# Patient Record
Sex: FEMALE | Race: OTHER | HISPANIC OR LATINO | Employment: UNEMPLOYED | URBAN - METROPOLITAN AREA
[De-identification: names, ages, dates, MRNs, and addresses within clinical notes are randomized per-mention and may not be internally consistent; named-entity substitution may affect disease eponyms.]

---

## 2017-01-03 ENCOUNTER — APPOINTMENT (OUTPATIENT)
Dept: RADIATION ONCOLOGY | Facility: CLINIC | Age: 52
End: 2017-01-03
Attending: RADIOLOGY
Payer: COMMERCIAL

## 2017-01-03 PROCEDURE — 77387 GUIDANCE FOR RADJ TX DLVR: CPT | Performed by: RADIOLOGY

## 2017-01-03 PROCEDURE — 77412 RADIATION TX DELIVERY LVL 3: CPT | Performed by: RADIOLOGY

## 2017-01-04 ENCOUNTER — APPOINTMENT (OUTPATIENT)
Dept: LAB | Facility: CLINIC | Age: 52
End: 2017-01-04
Attending: RADIOLOGY
Payer: COMMERCIAL

## 2017-01-04 DIAGNOSIS — C50.412 MALIGNANT NEOPLASM OF UPPER-OUTER QUADRANT OF LEFT FEMALE BREAST (HCC): ICD-10-CM

## 2017-01-04 LAB
ERYTHROCYTE [DISTWIDTH] IN BLOOD BY AUTOMATED COUNT: 12.5 % (ref 11.6–15.1)
HCT VFR BLD AUTO: 31.8 % (ref 34.8–46.1)
HGB BLD-MCNC: 10.4 G/DL (ref 11.5–15.4)
MCH RBC QN AUTO: 26.9 PG (ref 26.8–34.3)
MCHC RBC AUTO-ENTMCNC: 32.7 G/DL (ref 31.4–37.4)
MCV RBC AUTO: 82 FL (ref 82–98)
PLATELET # BLD AUTO: 201 THOUSANDS/UL (ref 149–390)
PMV BLD AUTO: 8.2 FL (ref 8.9–12.7)
RBC # BLD AUTO: 3.87 MILLION/UL (ref 3.81–5.12)
WBC # BLD AUTO: 4.4 THOUSAND/UL (ref 4.31–10.16)

## 2017-01-04 PROCEDURE — 77417 THER RADIOLOGY PORT IMAGE(S): CPT | Performed by: RADIOLOGY

## 2017-01-04 PROCEDURE — 77412 RADIATION TX DELIVERY LVL 3: CPT | Performed by: RADIOLOGY

## 2017-01-04 PROCEDURE — 77387 GUIDANCE FOR RADJ TX DLVR: CPT | Performed by: RADIOLOGY

## 2017-01-04 PROCEDURE — 85027 COMPLETE CBC AUTOMATED: CPT

## 2017-01-04 PROCEDURE — 36415 COLL VENOUS BLD VENIPUNCTURE: CPT

## 2017-01-04 RX ORDER — SODIUM CHLORIDE 9 MG/ML
20 INJECTION, SOLUTION INTRAVENOUS CONTINUOUS
Status: DISCONTINUED | OUTPATIENT
Start: 2017-01-05 | End: 2017-01-08 | Stop reason: HOSPADM

## 2017-01-05 ENCOUNTER — HOSPITAL ENCOUNTER (OUTPATIENT)
Dept: INFUSION CENTER | Facility: CLINIC | Age: 52
Discharge: HOME | End: 2017-01-05
Payer: COMMERCIAL

## 2017-01-05 ENCOUNTER — ALLSCRIPTS OFFICE VISIT (OUTPATIENT)
Dept: OTHER | Facility: OTHER | Age: 52
End: 2017-01-05

## 2017-01-05 VITALS — WEIGHT: 108.14 LBS | BODY MASS INDEX: 21.84 KG/M2

## 2017-01-05 PROCEDURE — 77387 GUIDANCE FOR RADJ TX DLVR: CPT | Performed by: RADIOLOGY

## 2017-01-05 PROCEDURE — 96413 CHEMO IV INFUSION 1 HR: CPT

## 2017-01-05 PROCEDURE — 77412 RADIATION TX DELIVERY LVL 3: CPT | Performed by: RADIOLOGY

## 2017-01-05 RX ADMIN — Medication 300 UNITS: at 12:46

## 2017-01-05 RX ADMIN — TRASTUZUMAB 286 MG: KIT at 11:41

## 2017-01-05 RX ADMIN — SODIUM CHLORIDE 20 ML/HR: 0.9 INJECTION, SOLUTION INTRAVENOUS at 11:30

## 2017-01-05 NOTE — PROGRESS NOTES
Pt tolerated treatment without complaints  Pt discharged from Infusion center to home with daughter  Pt will follow up with PCP  Pt/daughter aware of next treatment

## 2017-01-05 NOTE — PROGRESS NOTES
Pt arrived to Infusion center for Herceptinpt, was seen in  Dr Flex Mccann office this am   VS upon arrival temp 100 9f, discussed with pt if any symptoms and pt states" I am coming down with a cold"  Pt c/o coughing with congestion and occasional phlegm clear-yellow in color  Called and spoke with Paola in Dr Flex Mccann office, ok to give treatment and Recommended pt call PCP for follow up  Pt and daughter verbalized understanding

## 2017-01-09 PROCEDURE — 77336 RADIATION PHYSICS CONSULT: CPT | Performed by: RADIOLOGY

## 2017-01-09 PROCEDURE — 77412 RADIATION TX DELIVERY LVL 3: CPT | Performed by: RADIOLOGY

## 2017-01-09 PROCEDURE — 77387 GUIDANCE FOR RADJ TX DLVR: CPT | Performed by: RADIOLOGY

## 2017-01-09 PROCEDURE — 77417 THER RADIOLOGY PORT IMAGE(S): CPT | Performed by: RADIOLOGY

## 2017-01-10 PROCEDURE — 77387 GUIDANCE FOR RADJ TX DLVR: CPT | Performed by: RADIOLOGY

## 2017-01-10 PROCEDURE — 77412 RADIATION TX DELIVERY LVL 3: CPT | Performed by: RADIOLOGY

## 2017-01-11 PROCEDURE — 77412 RADIATION TX DELIVERY LVL 3: CPT | Performed by: RADIOLOGY

## 2017-01-11 PROCEDURE — 77387 GUIDANCE FOR RADJ TX DLVR: CPT | Performed by: RADIOLOGY

## 2017-01-11 PROCEDURE — 77417 THER RADIOLOGY PORT IMAGE(S): CPT | Performed by: RADIOLOGY

## 2017-01-12 PROCEDURE — 77387 GUIDANCE FOR RADJ TX DLVR: CPT | Performed by: RADIOLOGY

## 2017-01-12 PROCEDURE — 77412 RADIATION TX DELIVERY LVL 3: CPT | Performed by: RADIOLOGY

## 2017-01-13 PROCEDURE — 77387 GUIDANCE FOR RADJ TX DLVR: CPT | Performed by: RADIOLOGY

## 2017-01-13 PROCEDURE — 77412 RADIATION TX DELIVERY LVL 3: CPT | Performed by: RADIOLOGY

## 2017-01-15 ENCOUNTER — APPOINTMENT (EMERGENCY)
Dept: CT IMAGING | Facility: HOSPITAL | Age: 52
End: 2017-01-15
Payer: COMMERCIAL

## 2017-01-15 ENCOUNTER — HOSPITAL ENCOUNTER (EMERGENCY)
Facility: HOSPITAL | Age: 52
Discharge: HOME | End: 2017-01-15
Attending: EMERGENCY MEDICINE | Admitting: EMERGENCY MEDICINE
Payer: COMMERCIAL

## 2017-01-15 ENCOUNTER — APPOINTMENT (EMERGENCY)
Dept: RADIOLOGY | Facility: HOSPITAL | Age: 52
End: 2017-01-15
Payer: COMMERCIAL

## 2017-01-15 VITALS
SYSTOLIC BLOOD PRESSURE: 127 MMHG | TEMPERATURE: 100.5 F | WEIGHT: 101.8 LBS | HEART RATE: 107 BPM | DIASTOLIC BLOOD PRESSURE: 61 MMHG | OXYGEN SATURATION: 97 % | RESPIRATION RATE: 20 BRPM | BODY MASS INDEX: 20.56 KG/M2

## 2017-01-15 DIAGNOSIS — J18.9 LEFT LOWER LOBE PNEUMONIA: Primary | ICD-10-CM

## 2017-01-15 LAB
ANION GAP SERPL CALCULATED.3IONS-SCNC: 8 MMOL/L (ref 4–13)
BASOPHILS # BLD AUTO: 0.01 THOUSANDS/ΜL (ref 0–0.1)
BASOPHILS NFR BLD AUTO: 0 % (ref 0–1)
BUN SERPL-MCNC: 27 MG/DL (ref 5–25)
CALCIUM SERPL-MCNC: 9.8 MG/DL (ref 8.3–10.1)
CHLORIDE SERPL-SCNC: 97 MMOL/L (ref 100–108)
CO2 SERPL-SCNC: 30 MMOL/L (ref 21–32)
CREAT SERPL-MCNC: 1.01 MG/DL (ref 0.6–1.3)
DEPRECATED D DIMER PPP: 1247 NG/ML (FEU) (ref 0–424)
EOSINOPHIL # BLD AUTO: 0.05 THOUSAND/ΜL (ref 0–0.61)
EOSINOPHIL NFR BLD AUTO: 1 % (ref 0–6)
ERYTHROCYTE [DISTWIDTH] IN BLOOD BY AUTOMATED COUNT: 12.6 % (ref 11.6–15.1)
GFR SERPL CREATININE-BSD FRML MDRD: 58 ML/MIN/1.73SQ M
GLUCOSE SERPL-MCNC: 183 MG/DL (ref 65–140)
HCT VFR BLD AUTO: 32.2 % (ref 34.8–46.1)
HGB BLD-MCNC: 10.5 G/DL (ref 11.5–15.4)
LYMPHOCYTES # BLD AUTO: 0.47 THOUSANDS/ΜL (ref 0.6–4.47)
LYMPHOCYTES NFR BLD AUTO: 5 % (ref 14–44)
MAGNESIUM SERPL-MCNC: 2.1 MG/DL (ref 1.6–2.6)
MCH RBC QN AUTO: 26.9 PG (ref 26.8–34.3)
MCHC RBC AUTO-ENTMCNC: 32.6 G/DL (ref 31.4–37.4)
MCV RBC AUTO: 82 FL (ref 82–98)
MONOCYTES # BLD AUTO: 0.62 THOUSAND/ΜL (ref 0.17–1.22)
MONOCYTES NFR BLD AUTO: 6 % (ref 4–12)
NEUTROPHILS # BLD AUTO: 8.48 THOUSANDS/ΜL (ref 1.85–7.62)
NEUTS SEG NFR BLD AUTO: 88 % (ref 43–75)
NRBC BLD AUTO-RTO: 0 /100 WBCS
PLATELET # BLD AUTO: 373 THOUSANDS/UL (ref 149–390)
PMV BLD AUTO: 9.9 FL (ref 8.9–12.7)
POTASSIUM SERPL-SCNC: 3.2 MMOL/L (ref 3.5–5.3)
RBC # BLD AUTO: 3.91 MILLION/UL (ref 3.81–5.12)
SODIUM SERPL-SCNC: 135 MMOL/L (ref 136–145)
WBC # BLD AUTO: 9.63 THOUSAND/UL (ref 4.31–10.16)

## 2017-01-15 PROCEDURE — 85025 COMPLETE CBC W/AUTO DIFF WBC: CPT | Performed by: EMERGENCY MEDICINE

## 2017-01-15 PROCEDURE — 83735 ASSAY OF MAGNESIUM: CPT | Performed by: EMERGENCY MEDICINE

## 2017-01-15 PROCEDURE — 96365 THER/PROPH/DIAG IV INF INIT: CPT

## 2017-01-15 PROCEDURE — 71275 CT ANGIOGRAPHY CHEST: CPT

## 2017-01-15 PROCEDURE — 80048 BASIC METABOLIC PNL TOTAL CA: CPT | Performed by: EMERGENCY MEDICINE

## 2017-01-15 PROCEDURE — 85379 FIBRIN DEGRADATION QUANT: CPT | Performed by: EMERGENCY MEDICINE

## 2017-01-15 PROCEDURE — 96361 HYDRATE IV INFUSION ADD-ON: CPT

## 2017-01-15 PROCEDURE — 36415 COLL VENOUS BLD VENIPUNCTURE: CPT | Performed by: EMERGENCY MEDICINE

## 2017-01-15 PROCEDURE — 99284 EMERGENCY DEPT VISIT MOD MDM: CPT

## 2017-01-15 PROCEDURE — 71020 HB CHEST X-RAY 2VW FRONTAL&LATL: CPT

## 2017-01-15 PROCEDURE — 94640 AIRWAY INHALATION TREATMENT: CPT

## 2017-01-15 RX ORDER — SODIUM CHLORIDE FOR INHALATION 0.9 %
3 VIAL, NEBULIZER (ML) INHALATION ONCE
Status: COMPLETED | OUTPATIENT
Start: 2017-01-15 | End: 2017-01-15

## 2017-01-15 RX ORDER — AMOXICILLIN AND CLAVULANATE POTASSIUM 875; 125 MG/1; MG/1
1 TABLET, FILM COATED ORAL EVERY 12 HOURS
Qty: 20 TABLET | Refills: 0 | Status: SHIPPED | OUTPATIENT
Start: 2017-01-15 | End: 2017-01-21 | Stop reason: HOSPADM

## 2017-01-15 RX ORDER — AMLODIPINE BESYLATE 5 MG/1
5 TABLET ORAL DAILY
COMMUNITY

## 2017-01-15 RX ADMIN — ISODIUM CHLORIDE 3 ML: 0.03 SOLUTION RESPIRATORY (INHALATION) at 12:07

## 2017-01-15 RX ADMIN — CEFTRIAXONE 1000 MG: 1 INJECTION, POWDER, FOR SOLUTION INTRAMUSCULAR; INTRAVENOUS at 14:57

## 2017-01-15 RX ADMIN — SODIUM CHLORIDE 1000 ML: 0.9 INJECTION, SOLUTION INTRAVENOUS at 12:14

## 2017-01-15 RX ADMIN — IOHEXOL 70 ML: 350 INJECTION, SOLUTION INTRAVENOUS at 14:27

## 2017-01-15 RX ADMIN — IPRATROPIUM BROMIDE 1 MG: 0.5 SOLUTION RESPIRATORY (INHALATION) at 12:07

## 2017-01-15 RX ADMIN — ALBUTEROL SULFATE 10 MG: 2.5 SOLUTION RESPIRATORY (INHALATION) at 12:07

## 2017-01-16 PROCEDURE — 77412 RADIATION TX DELIVERY LVL 3: CPT | Performed by: RADIOLOGY

## 2017-01-16 PROCEDURE — 77336 RADIATION PHYSICS CONSULT: CPT | Performed by: RADIOLOGY

## 2017-01-16 PROCEDURE — 77387 GUIDANCE FOR RADJ TX DLVR: CPT | Performed by: RADIOLOGY

## 2017-01-17 ENCOUNTER — HOSPITAL ENCOUNTER (INPATIENT)
Facility: HOSPITAL | Age: 52
LOS: 4 days | Discharge: HOME | DRG: 139 | End: 2017-01-21
Attending: EMERGENCY MEDICINE | Admitting: INTERNAL MEDICINE
Payer: COMMERCIAL

## 2017-01-17 ENCOUNTER — APPOINTMENT (EMERGENCY)
Dept: RADIOLOGY | Facility: HOSPITAL | Age: 52
DRG: 139 | End: 2017-01-17
Payer: COMMERCIAL

## 2017-01-17 DIAGNOSIS — J45.909 ASTHMA: ICD-10-CM

## 2017-01-17 DIAGNOSIS — C50.919: ICD-10-CM

## 2017-01-17 DIAGNOSIS — J18.9 PNEUMONIA: Primary | ICD-10-CM

## 2017-01-17 LAB
ANION GAP SERPL CALCULATED.3IONS-SCNC: 10 MMOL/L (ref 4–13)
BASOPHILS # BLD AUTO: 0.01 THOUSANDS/ΜL (ref 0–0.1)
BASOPHILS NFR BLD AUTO: 0 % (ref 0–1)
BUN SERPL-MCNC: 17 MG/DL (ref 5–25)
CALCIUM SERPL-MCNC: 9.9 MG/DL (ref 8.3–10.1)
CHLORIDE SERPL-SCNC: 98 MMOL/L (ref 100–108)
CO2 SERPL-SCNC: 29 MMOL/L (ref 21–32)
CREAT SERPL-MCNC: 0.86 MG/DL (ref 0.6–1.3)
EOSINOPHIL # BLD AUTO: 0.1 THOUSAND/ΜL (ref 0–0.61)
EOSINOPHIL NFR BLD AUTO: 2 % (ref 0–6)
ERYTHROCYTE [DISTWIDTH] IN BLOOD BY AUTOMATED COUNT: 12.6 % (ref 11.6–15.1)
GFR SERPL CREATININE-BSD FRML MDRD: >60 ML/MIN/1.73SQ M
GLUCOSE SERPL-MCNC: 141 MG/DL (ref 65–140)
HCT VFR BLD AUTO: 33.6 % (ref 34.8–46.1)
HGB BLD-MCNC: 11 G/DL (ref 11.5–15.4)
L PNEUMO1 AG UR QL IA.RAPID: NEGATIVE
LYMPHOCYTES # BLD AUTO: 0.61 THOUSANDS/ΜL (ref 0.6–4.47)
LYMPHOCYTES NFR BLD AUTO: 12 % (ref 14–44)
MCH RBC QN AUTO: 26.5 PG (ref 26.8–34.3)
MCHC RBC AUTO-ENTMCNC: 32.7 G/DL (ref 31.4–37.4)
MCV RBC AUTO: 81 FL (ref 82–98)
MONOCYTES # BLD AUTO: 0.34 THOUSAND/ΜL (ref 0.17–1.22)
MONOCYTES NFR BLD AUTO: 7 % (ref 4–12)
NEUTROPHILS # BLD AUTO: 4.14 THOUSANDS/ΜL (ref 1.85–7.62)
NEUTS SEG NFR BLD AUTO: 79 % (ref 43–75)
PLATELET # BLD AUTO: 505 THOUSANDS/UL (ref 149–390)
PMV BLD AUTO: 10.3 FL (ref 8.9–12.7)
POTASSIUM SERPL-SCNC: 3.2 MMOL/L (ref 3.5–5.3)
RBC # BLD AUTO: 4.15 MILLION/UL (ref 3.81–5.12)
S PNEUM AG UR QL: NEGATIVE
SODIUM SERPL-SCNC: 137 MMOL/L (ref 136–145)
WBC # BLD AUTO: 5.2 THOUSAND/UL (ref 4.31–10.16)

## 2017-01-17 PROCEDURE — 99285 EMERGENCY DEPT VISIT HI MDM: CPT

## 2017-01-17 PROCEDURE — 71020 HB CHEST X-RAY 2VW FRONTAL&LATL: CPT

## 2017-01-17 PROCEDURE — 85025 COMPLETE CBC W/AUTO DIFF WBC: CPT | Performed by: EMERGENCY MEDICINE

## 2017-01-17 PROCEDURE — 87449 NOS EACH ORGANISM AG IA: CPT | Performed by: PHYSICIAN ASSISTANT

## 2017-01-17 PROCEDURE — 36415 COLL VENOUS BLD VENIPUNCTURE: CPT | Performed by: EMERGENCY MEDICINE

## 2017-01-17 PROCEDURE — 94760 N-INVAS EAR/PLS OXIMETRY 1: CPT

## 2017-01-17 PROCEDURE — 94640 AIRWAY INHALATION TREATMENT: CPT

## 2017-01-17 PROCEDURE — 80048 BASIC METABOLIC PNL TOTAL CA: CPT | Performed by: EMERGENCY MEDICINE

## 2017-01-17 RX ORDER — ZOLPIDEM TARTRATE 5 MG/1
5 TABLET ORAL
Status: DISCONTINUED | OUTPATIENT
Start: 2017-01-17 | End: 2017-01-18

## 2017-01-17 RX ORDER — GUAIFENESIN 600 MG
600 TABLET, EXTENDED RELEASE 12 HR ORAL 2 TIMES DAILY
Status: DISCONTINUED | OUTPATIENT
Start: 2017-01-17 | End: 2017-01-21 | Stop reason: HOSPADM

## 2017-01-17 RX ORDER — LORAZEPAM 2 MG/ML
0.5 INJECTION INTRAMUSCULAR EVERY 4 HOURS PRN
Status: DISCONTINUED | OUTPATIENT
Start: 2017-01-17 | End: 2017-01-21 | Stop reason: HOSPADM

## 2017-01-17 RX ORDER — AMLODIPINE BESYLATE 5 MG/1
5 TABLET ORAL DAILY
Status: DISCONTINUED | OUTPATIENT
Start: 2017-01-17 | End: 2017-01-21 | Stop reason: HOSPADM

## 2017-01-17 RX ORDER — ONDANSETRON 2 MG/ML
4 INJECTION INTRAMUSCULAR; INTRAVENOUS EVERY 6 HOURS PRN
Status: DISCONTINUED | OUTPATIENT
Start: 2017-01-17 | End: 2017-01-21 | Stop reason: HOSPADM

## 2017-01-17 RX ORDER — LEVOFLOXACIN 5 MG/ML
750 INJECTION, SOLUTION INTRAVENOUS EVERY 24 HOURS
Status: DISCONTINUED | OUTPATIENT
Start: 2017-01-17 | End: 2017-01-20

## 2017-01-17 RX ORDER — METOPROLOL TARTRATE 50 MG/1
50 TABLET, FILM COATED ORAL DAILY
Status: DISCONTINUED | OUTPATIENT
Start: 2017-01-17 | End: 2017-01-21 | Stop reason: HOSPADM

## 2017-01-17 RX ORDER — SODIUM CHLORIDE FOR INHALATION 0.9 %
VIAL, NEBULIZER (ML) INHALATION
Status: COMPLETED
Start: 2017-01-17 | End: 2017-01-17

## 2017-01-17 RX ORDER — POTASSIUM CHLORIDE 20 MEQ/1
40 TABLET, EXTENDED RELEASE ORAL ONCE
Status: COMPLETED | OUTPATIENT
Start: 2017-01-17 | End: 2017-01-17

## 2017-01-17 RX ORDER — MAGNESIUM HYDROXIDE/ALUMINUM HYDROXICE/SIMETHICONE 120; 1200; 1200 MG/30ML; MG/30ML; MG/30ML
30 SUSPENSION ORAL EVERY 6 HOURS PRN
Status: DISCONTINUED | OUTPATIENT
Start: 2017-01-17 | End: 2017-01-21 | Stop reason: HOSPADM

## 2017-01-17 RX ORDER — LEVOFLOXACIN 5 MG/ML
750 INJECTION, SOLUTION INTRAVENOUS ONCE
Status: COMPLETED | OUTPATIENT
Start: 2017-01-17 | End: 2017-01-17

## 2017-01-17 RX ORDER — LEVALBUTEROL 1.25 MG/.5ML
1.25 SOLUTION, CONCENTRATE RESPIRATORY (INHALATION) EVERY 6 HOURS PRN
Status: DISCONTINUED | OUTPATIENT
Start: 2017-01-17 | End: 2017-01-18

## 2017-01-17 RX ORDER — ALBUTEROL SULFATE 90 UG/1
2 AEROSOL, METERED RESPIRATORY (INHALATION) EVERY 4 HOURS PRN
Status: DISCONTINUED | OUTPATIENT
Start: 2017-01-17 | End: 2017-01-21 | Stop reason: HOSPADM

## 2017-01-17 RX ORDER — ACETAMINOPHEN 325 MG/1
650 TABLET ORAL EVERY 6 HOURS PRN
Status: DISCONTINUED | OUTPATIENT
Start: 2017-01-17 | End: 2017-01-21 | Stop reason: HOSPADM

## 2017-01-17 RX ORDER — LISINOPRIL 20 MG/1
20 TABLET ORAL DAILY
Status: DISCONTINUED | OUTPATIENT
Start: 2017-01-17 | End: 2017-01-21 | Stop reason: HOSPADM

## 2017-01-17 RX ORDER — HYDRALAZINE HYDROCHLORIDE 20 MG/ML
10 INJECTION INTRAMUSCULAR; INTRAVENOUS EVERY 6 HOURS PRN
Status: DISCONTINUED | OUTPATIENT
Start: 2017-01-17 | End: 2017-01-21 | Stop reason: HOSPADM

## 2017-01-17 RX ADMIN — POTASSIUM CHLORIDE 40 MEQ: 1500 TABLET, EXTENDED RELEASE ORAL at 13:35

## 2017-01-17 RX ADMIN — ISODIUM CHLORIDE 3 ML: 0.03 SOLUTION RESPIRATORY (INHALATION) at 23:06

## 2017-01-17 RX ADMIN — IPRATROPIUM BROMIDE 0.5 MG: 0.5 SOLUTION RESPIRATORY (INHALATION) at 09:34

## 2017-01-17 RX ADMIN — LEVALBUTEROL 1.25 MG: 1.25 SOLUTION, CONCENTRATE RESPIRATORY (INHALATION) at 16:46

## 2017-01-17 RX ADMIN — LEVALBUTEROL 1.25 MG: 1.25 SOLUTION, CONCENTRATE RESPIRATORY (INHALATION) at 23:06

## 2017-01-17 RX ADMIN — LEVOFLOXACIN 750 MG: 5 INJECTION, SOLUTION INTRAVENOUS at 11:27

## 2017-01-17 RX ADMIN — ISODIUM CHLORIDE 3 ML: 0.03 SOLUTION RESPIRATORY (INHALATION) at 16:46

## 2017-01-17 RX ADMIN — ZOLPIDEM TARTRATE 5 MG: 5 TABLET, FILM COATED ORAL at 23:08

## 2017-01-17 RX ADMIN — LORAZEPAM 0.5 MG: 2 INJECTION INTRAMUSCULAR; INTRAVENOUS at 13:59

## 2017-01-17 RX ADMIN — GUAIFENESIN 600 MG: 600 TABLET, EXTENDED RELEASE ORAL at 13:35

## 2017-01-17 RX ADMIN — ALBUTEROL SULFATE 5 MG: 2.5 SOLUTION RESPIRATORY (INHALATION) at 09:34

## 2017-01-17 RX ADMIN — GUAIFENESIN 600 MG: 600 TABLET, EXTENDED RELEASE ORAL at 17:27

## 2017-01-18 PROBLEM — E44.0 MODERATE PROTEIN-CALORIE MALNUTRITION (HCC): Status: ACTIVE | Noted: 2017-01-18

## 2017-01-18 LAB
ANION GAP SERPL CALCULATED.3IONS-SCNC: 8 MMOL/L (ref 4–13)
BUN SERPL-MCNC: 14 MG/DL (ref 5–25)
CALCIUM SERPL-MCNC: 9.3 MG/DL (ref 8.3–10.1)
CHLORIDE SERPL-SCNC: 104 MMOL/L (ref 100–108)
CO2 SERPL-SCNC: 28 MMOL/L (ref 21–32)
CREAT SERPL-MCNC: 0.84 MG/DL (ref 0.6–1.3)
GFR SERPL CREATININE-BSD FRML MDRD: >60 ML/MIN/1.73SQ M
GLUCOSE SERPL-MCNC: 99 MG/DL (ref 65–140)
POTASSIUM SERPL-SCNC: 3.5 MMOL/L (ref 3.5–5.3)
SODIUM SERPL-SCNC: 140 MMOL/L (ref 136–145)

## 2017-01-18 PROCEDURE — G8987 SELF CARE CURRENT STATUS: HCPCS

## 2017-01-18 PROCEDURE — G8988 SELF CARE GOAL STATUS: HCPCS

## 2017-01-18 PROCEDURE — 80048 BASIC METABOLIC PNL TOTAL CA: CPT | Performed by: PHYSICIAN ASSISTANT

## 2017-01-18 PROCEDURE — 94760 N-INVAS EAR/PLS OXIMETRY 1: CPT

## 2017-01-18 PROCEDURE — 94640 AIRWAY INHALATION TREATMENT: CPT

## 2017-01-18 PROCEDURE — G8979 MOBILITY GOAL STATUS: HCPCS

## 2017-01-18 PROCEDURE — G8978 MOBILITY CURRENT STATUS: HCPCS

## 2017-01-18 PROCEDURE — G8989 SELF CARE D/C STATUS: HCPCS

## 2017-01-18 PROCEDURE — 97165 OT EVAL LOW COMPLEX 30 MIN: CPT

## 2017-01-18 PROCEDURE — 97162 PT EVAL MOD COMPLEX 30 MIN: CPT

## 2017-01-18 RX ORDER — LEVALBUTEROL 1.25 MG/.5ML
1.25 SOLUTION, CONCENTRATE RESPIRATORY (INHALATION)
Status: DISCONTINUED | OUTPATIENT
Start: 2017-01-18 | End: 2017-01-21 | Stop reason: HOSPADM

## 2017-01-18 RX ORDER — SODIUM CHLORIDE FOR INHALATION 0.9 %
VIAL, NEBULIZER (ML) INHALATION
Status: COMPLETED
Start: 2017-01-18 | End: 2017-01-18

## 2017-01-18 RX ORDER — SODIUM CHLORIDE FOR INHALATION 0.9 %
3 VIAL, NEBULIZER (ML) INHALATION EVERY 6 HOURS PRN
Status: DISCONTINUED | OUTPATIENT
Start: 2017-01-18 | End: 2017-01-21 | Stop reason: HOSPADM

## 2017-01-18 RX ORDER — ALPRAZOLAM 0.5 MG/1
0.5 TABLET ORAL 2 TIMES DAILY
Status: DISCONTINUED | OUTPATIENT
Start: 2017-01-18 | End: 2017-01-21 | Stop reason: HOSPADM

## 2017-01-18 RX ORDER — ZOLPIDEM TARTRATE 5 MG/1
10 TABLET ORAL
Status: DISCONTINUED | OUTPATIENT
Start: 2017-01-18 | End: 2017-01-21 | Stop reason: HOSPADM

## 2017-01-18 RX ADMIN — LEVALBUTEROL 1.25 MG: 1.25 SOLUTION, CONCENTRATE RESPIRATORY (INHALATION) at 19:41

## 2017-01-18 RX ADMIN — LEVALBUTEROL 1.25 MG: 1.25 SOLUTION, CONCENTRATE RESPIRATORY (INHALATION) at 13:39

## 2017-01-18 RX ADMIN — LISINOPRIL 20 MG: 20 TABLET ORAL at 08:33

## 2017-01-18 RX ADMIN — LORAZEPAM 0.5 MG: 2 INJECTION INTRAMUSCULAR; INTRAVENOUS at 17:28

## 2017-01-18 RX ADMIN — METOPROLOL TARTRATE 50 MG: 50 TABLET ORAL at 08:33

## 2017-01-18 RX ADMIN — ISODIUM CHLORIDE 3 ML: 0.03 SOLUTION RESPIRATORY (INHALATION) at 19:42

## 2017-01-18 RX ADMIN — ZOLPIDEM TARTRATE 10 MG: 5 TABLET, FILM COATED ORAL at 21:46

## 2017-01-18 RX ADMIN — AMLODIPINE BESYLATE 5 MG: 5 TABLET ORAL at 08:34

## 2017-01-18 RX ADMIN — GUAIFENESIN 600 MG: 600 TABLET, EXTENDED RELEASE ORAL at 17:31

## 2017-01-18 RX ADMIN — GUAIFENESIN 600 MG: 600 TABLET, EXTENDED RELEASE ORAL at 08:34

## 2017-01-18 RX ADMIN — ALBUTEROL SULFATE 2 PUFF: 90 AEROSOL, METERED RESPIRATORY (INHALATION) at 08:54

## 2017-01-18 RX ADMIN — LEVOFLOXACIN 750 MG: 5 INJECTION, SOLUTION INTRAVENOUS at 13:30

## 2017-01-18 RX ADMIN — ALPRAZOLAM 0.5 MG: 0.5 TABLET ORAL at 21:45

## 2017-01-18 RX ADMIN — ISODIUM CHLORIDE: 0.03 SOLUTION RESPIRATORY (INHALATION) at 14:46

## 2017-01-19 PROCEDURE — 94760 N-INVAS EAR/PLS OXIMETRY 1: CPT

## 2017-01-19 PROCEDURE — 94640 AIRWAY INHALATION TREATMENT: CPT

## 2017-01-19 RX ADMIN — AMLODIPINE BESYLATE 5 MG: 5 TABLET ORAL at 08:33

## 2017-01-19 RX ADMIN — ENOXAPARIN SODIUM 40 MG: 40 INJECTION SUBCUTANEOUS at 16:13

## 2017-01-19 RX ADMIN — LISINOPRIL 20 MG: 20 TABLET ORAL at 08:33

## 2017-01-19 RX ADMIN — ISODIUM CHLORIDE 3 ML: 0.03 SOLUTION RESPIRATORY (INHALATION) at 19:23

## 2017-01-19 RX ADMIN — LEVOFLOXACIN 750 MG: 5 INJECTION, SOLUTION INTRAVENOUS at 11:59

## 2017-01-19 RX ADMIN — LEVALBUTEROL 1.25 MG: 1.25 SOLUTION, CONCENTRATE RESPIRATORY (INHALATION) at 07:41

## 2017-01-19 RX ADMIN — LEVALBUTEROL 1.25 MG: 1.25 SOLUTION, CONCENTRATE RESPIRATORY (INHALATION) at 19:23

## 2017-01-19 RX ADMIN — ALPRAZOLAM 0.5 MG: 0.5 TABLET ORAL at 08:33

## 2017-01-19 RX ADMIN — ISODIUM CHLORIDE 3 ML: 0.03 SOLUTION RESPIRATORY (INHALATION) at 07:41

## 2017-01-19 RX ADMIN — ISODIUM CHLORIDE 3 ML: 0.03 SOLUTION RESPIRATORY (INHALATION) at 13:12

## 2017-01-19 RX ADMIN — ZOLPIDEM TARTRATE 10 MG: 5 TABLET, FILM COATED ORAL at 22:04

## 2017-01-19 RX ADMIN — GUAIFENESIN 600 MG: 600 TABLET, EXTENDED RELEASE ORAL at 17:13

## 2017-01-19 RX ADMIN — LEVALBUTEROL 1.25 MG: 1.25 SOLUTION, CONCENTRATE RESPIRATORY (INHALATION) at 13:12

## 2017-01-19 RX ADMIN — METOPROLOL TARTRATE 50 MG: 50 TABLET ORAL at 08:33

## 2017-01-19 RX ADMIN — GUAIFENESIN 600 MG: 600 TABLET, EXTENDED RELEASE ORAL at 08:33

## 2017-01-19 RX ADMIN — ALPRAZOLAM 0.5 MG: 0.5 TABLET ORAL at 17:13

## 2017-01-20 PROCEDURE — 97116 GAIT TRAINING THERAPY: CPT

## 2017-01-20 PROCEDURE — 94640 AIRWAY INHALATION TREATMENT: CPT

## 2017-01-20 PROCEDURE — 94760 N-INVAS EAR/PLS OXIMETRY 1: CPT

## 2017-01-20 PROCEDURE — 97110 THERAPEUTIC EXERCISES: CPT

## 2017-01-20 RX ORDER — LEVOFLOXACIN 750 MG/1
750 TABLET ORAL EVERY 24 HOURS
Status: DISCONTINUED | OUTPATIENT
Start: 2017-01-20 | End: 2017-01-21 | Stop reason: HOSPADM

## 2017-01-20 RX ADMIN — GUAIFENESIN 600 MG: 600 TABLET, EXTENDED RELEASE ORAL at 17:35

## 2017-01-20 RX ADMIN — LISINOPRIL 20 MG: 20 TABLET ORAL at 09:11

## 2017-01-20 RX ADMIN — ALPRAZOLAM 0.5 MG: 0.5 TABLET ORAL at 17:35

## 2017-01-20 RX ADMIN — GUAIFENESIN 600 MG: 600 TABLET, EXTENDED RELEASE ORAL at 09:11

## 2017-01-20 RX ADMIN — ENOXAPARIN SODIUM 40 MG: 40 INJECTION SUBCUTANEOUS at 09:11

## 2017-01-20 RX ADMIN — ALPRAZOLAM 0.5 MG: 0.5 TABLET ORAL at 09:11

## 2017-01-20 RX ADMIN — ISODIUM CHLORIDE 3 ML: 0.03 SOLUTION RESPIRATORY (INHALATION) at 20:38

## 2017-01-20 RX ADMIN — AMLODIPINE BESYLATE 5 MG: 5 TABLET ORAL at 09:11

## 2017-01-20 RX ADMIN — METOPROLOL TARTRATE 50 MG: 50 TABLET ORAL at 09:11

## 2017-01-20 RX ADMIN — ISODIUM CHLORIDE 3 ML: 0.03 SOLUTION RESPIRATORY (INHALATION) at 13:03

## 2017-01-20 RX ADMIN — LEVALBUTEROL 1.25 MG: 1.25 SOLUTION, CONCENTRATE RESPIRATORY (INHALATION) at 20:38

## 2017-01-20 RX ADMIN — ZOLPIDEM TARTRATE 10 MG: 5 TABLET, FILM COATED ORAL at 21:28

## 2017-01-20 RX ADMIN — ISODIUM CHLORIDE 3 ML: 0.03 SOLUTION RESPIRATORY (INHALATION) at 08:48

## 2017-01-20 RX ADMIN — LEVOFLOXACIN 750 MG: 750 TABLET, FILM COATED ORAL at 13:25

## 2017-01-20 RX ADMIN — LEVALBUTEROL 1.25 MG: 1.25 SOLUTION, CONCENTRATE RESPIRATORY (INHALATION) at 13:03

## 2017-01-20 RX ADMIN — LEVALBUTEROL 1.25 MG: 1.25 SOLUTION, CONCENTRATE RESPIRATORY (INHALATION) at 08:48

## 2017-01-21 VITALS
SYSTOLIC BLOOD PRESSURE: 130 MMHG | DIASTOLIC BLOOD PRESSURE: 68 MMHG | WEIGHT: 103.17 LBS | BODY MASS INDEX: 20.8 KG/M2 | RESPIRATION RATE: 18 BRPM | HEART RATE: 78 BPM | TEMPERATURE: 97.9 F | OXYGEN SATURATION: 97 % | HEIGHT: 59 IN

## 2017-01-21 PROCEDURE — 94760 N-INVAS EAR/PLS OXIMETRY 1: CPT

## 2017-01-21 PROCEDURE — 94640 AIRWAY INHALATION TREATMENT: CPT

## 2017-01-21 RX ORDER — ALPRAZOLAM 0.5 MG/1
0.5 TABLET ORAL 2 TIMES DAILY PRN
Qty: 30 TABLET | Refills: 0 | Status: SHIPPED | OUTPATIENT
Start: 2017-01-21 | End: 2017-03-09 | Stop reason: ALTCHOICE

## 2017-01-21 RX ORDER — ALBUTEROL SULFATE 90 UG/1
2 AEROSOL, METERED RESPIRATORY (INHALATION) EVERY 4 HOURS PRN
Qty: 1 INHALER | Refills: 0 | Status: SHIPPED | OUTPATIENT
Start: 2017-01-21

## 2017-01-21 RX ORDER — GUAIFENESIN 600 MG
600 TABLET, EXTENDED RELEASE 12 HR ORAL 2 TIMES DAILY
Qty: 20 TABLET | Refills: 0 | Status: SHIPPED | OUTPATIENT
Start: 2017-01-21 | End: 2017-01-31

## 2017-01-21 RX ORDER — LEVOFLOXACIN 750 MG/1
750 TABLET ORAL DAILY
Qty: 4 TABLET | Refills: 0 | Status: SHIPPED | OUTPATIENT
Start: 2017-01-21 | End: 2017-01-26

## 2017-01-21 RX ADMIN — ISODIUM CHLORIDE 3 ML: 0.03 SOLUTION RESPIRATORY (INHALATION) at 07:10

## 2017-01-21 RX ADMIN — LEVALBUTEROL 1.25 MG: 1.25 SOLUTION, CONCENTRATE RESPIRATORY (INHALATION) at 07:10

## 2017-01-21 RX ADMIN — ALPRAZOLAM 0.5 MG: 0.5 TABLET ORAL at 09:32

## 2017-01-21 RX ADMIN — LISINOPRIL 20 MG: 20 TABLET ORAL at 09:32

## 2017-01-21 RX ADMIN — GUAIFENESIN 600 MG: 600 TABLET, EXTENDED RELEASE ORAL at 09:32

## 2017-01-21 RX ADMIN — METOPROLOL TARTRATE 50 MG: 50 TABLET ORAL at 09:32

## 2017-01-21 RX ADMIN — AMLODIPINE BESYLATE 5 MG: 5 TABLET ORAL at 09:32

## 2017-01-25 RX ORDER — SODIUM CHLORIDE 9 MG/ML
20 INJECTION, SOLUTION INTRAVENOUS CONTINUOUS
Status: DISCONTINUED | OUTPATIENT
Start: 2017-01-26 | End: 2017-01-29 | Stop reason: HOSPADM

## 2017-01-26 ENCOUNTER — HOSPITAL ENCOUNTER (OUTPATIENT)
Dept: INFUSION CENTER | Facility: CLINIC | Age: 52
Discharge: HOME | End: 2017-01-26
Payer: COMMERCIAL

## 2017-01-26 VITALS
BODY MASS INDEX: 21.28 KG/M2 | HEART RATE: 95 BPM | WEIGHT: 105.38 LBS | RESPIRATION RATE: 18 BRPM | SYSTOLIC BLOOD PRESSURE: 125 MMHG | TEMPERATURE: 97.4 F | DIASTOLIC BLOOD PRESSURE: 81 MMHG

## 2017-01-26 PROCEDURE — 77417 THER RADIOLOGY PORT IMAGE(S): CPT | Performed by: RADIOLOGY

## 2017-01-26 PROCEDURE — 96413 CHEMO IV INFUSION 1 HR: CPT

## 2017-01-26 PROCEDURE — 77412 RADIATION TX DELIVERY LVL 3: CPT | Performed by: RADIOLOGY

## 2017-01-26 RX ADMIN — SODIUM CHLORIDE 20 ML/HR: 0.9 INJECTION, SOLUTION INTRAVENOUS at 10:15

## 2017-01-26 RX ADMIN — Medication 286 MG: at 10:26

## 2017-01-26 RX ADMIN — Medication 300 UNITS: at 11:30

## 2017-01-27 PROCEDURE — 77387 GUIDANCE FOR RADJ TX DLVR: CPT | Performed by: RADIOLOGY

## 2017-01-27 PROCEDURE — 77412 RADIATION TX DELIVERY LVL 3: CPT | Performed by: RADIOLOGY

## 2017-01-30 PROCEDURE — 77412 RADIATION TX DELIVERY LVL 3: CPT | Performed by: RADIOLOGY

## 2017-01-30 PROCEDURE — 77387 GUIDANCE FOR RADJ TX DLVR: CPT | Performed by: RADIOLOGY

## 2017-01-31 PROCEDURE — 77412 RADIATION TX DELIVERY LVL 3: CPT | Performed by: RADIOLOGY

## 2017-01-31 PROCEDURE — 77387 GUIDANCE FOR RADJ TX DLVR: CPT | Performed by: RADIOLOGY

## 2017-02-01 ENCOUNTER — APPOINTMENT (OUTPATIENT)
Dept: RADIATION ONCOLOGY | Facility: CLINIC | Age: 52
End: 2017-02-01
Attending: RADIOLOGY
Payer: COMMERCIAL

## 2017-02-01 PROCEDURE — 77336 RADIATION PHYSICS CONSULT: CPT | Performed by: RADIOLOGY

## 2017-02-01 PROCEDURE — 77412 RADIATION TX DELIVERY LVL 3: CPT | Performed by: RADIOLOGY

## 2017-02-01 PROCEDURE — 77387 GUIDANCE FOR RADJ TX DLVR: CPT | Performed by: RADIOLOGY

## 2017-02-15 RX ORDER — SODIUM CHLORIDE 9 MG/ML
20 INJECTION, SOLUTION INTRAVENOUS CONTINUOUS
Status: DISCONTINUED | OUTPATIENT
Start: 2017-02-16 | End: 2017-02-19 | Stop reason: HOSPADM

## 2017-02-16 ENCOUNTER — HOSPITAL ENCOUNTER (OUTPATIENT)
Dept: INFUSION CENTER | Facility: CLINIC | Age: 52
Discharge: HOME | End: 2017-02-16
Payer: COMMERCIAL

## 2017-02-16 VITALS
DIASTOLIC BLOOD PRESSURE: 85 MMHG | BODY MASS INDEX: 21.95 KG/M2 | RESPIRATION RATE: 18 BRPM | SYSTOLIC BLOOD PRESSURE: 140 MMHG | WEIGHT: 108.69 LBS | HEART RATE: 88 BPM | TEMPERATURE: 98.1 F

## 2017-02-16 PROCEDURE — 96413 CHEMO IV INFUSION 1 HR: CPT

## 2017-02-16 RX ADMIN — Medication 286 MG: at 10:35

## 2017-02-16 RX ADMIN — SODIUM CHLORIDE 20 ML/HR: 0.9 INJECTION, SOLUTION INTRAVENOUS at 10:18

## 2017-02-16 RX ADMIN — Medication 300 UNITS: at 11:49

## 2017-02-16 NOTE — PROGRESS NOTES
Pt has small area, fluid fill bubble approximately the side of eraser head, noted at distal end of PAC incision  Pt states " I had a stitch"  No reddened area noted not painful to touch but on occasion pt describes "feeling a stick"  Called and spoke with Dr Odalys Ham and scheduled an appointment for them to evaluate site on Friday 2/17/17 at 1 pm at [facility] office  Pt verbalized understanding and  where office is located

## 2017-02-17 ENCOUNTER — ALLSCRIPTS OFFICE VISIT (OUTPATIENT)
Dept: OTHER | Facility: OTHER | Age: 52
End: 2017-02-17

## 2017-03-08 RX ORDER — SODIUM CHLORIDE 9 MG/ML
20 INJECTION, SOLUTION INTRAVENOUS CONTINUOUS
Status: DISCONTINUED | OUTPATIENT
Start: 2017-03-09 | End: 2017-03-12 | Stop reason: HOSPADM

## 2017-03-09 ENCOUNTER — HOSPITAL ENCOUNTER (OUTPATIENT)
Dept: INFUSION CENTER | Facility: CLINIC | Age: 52
Discharge: HOME | End: 2017-03-09
Payer: COMMERCIAL

## 2017-03-09 VITALS
BODY MASS INDEX: 22.49 KG/M2 | RESPIRATION RATE: 16 BRPM | TEMPERATURE: 98 F | WEIGHT: 111.33 LBS | SYSTOLIC BLOOD PRESSURE: 148 MMHG | DIASTOLIC BLOOD PRESSURE: 85 MMHG | HEART RATE: 98 BPM

## 2017-03-09 PROCEDURE — 96413 CHEMO IV INFUSION 1 HR: CPT

## 2017-03-09 RX ORDER — HYDROXYZINE PAMOATE 25 MG/1
25 CAPSULE ORAL 3 TIMES DAILY PRN
COMMUNITY
End: 2018-06-15

## 2017-03-09 RX ORDER — TAMOXIFEN CITRATE 20 MG/1
20 TABLET ORAL DAILY
COMMUNITY
End: 2018-09-10

## 2017-03-09 RX ADMIN — Medication 286 MG: at 11:00

## 2017-03-09 RX ADMIN — Medication 300 UNITS: at 12:01

## 2017-03-09 RX ADMIN — SODIUM CHLORIDE 20 ML/HR: 0.9 INJECTION, SOLUTION INTRAVENOUS at 10:21

## 2017-03-28 ENCOUNTER — HOSPITAL ENCOUNTER (OUTPATIENT)
Dept: NUCLEAR MEDICINE | Facility: HOSPITAL | Age: 52
Discharge: HOME | End: 2017-03-28
Attending: INTERNAL MEDICINE
Payer: COMMERCIAL

## 2017-03-28 DIAGNOSIS — C50.412 MALIGNANT NEOPLASM OF UPPER-OUTER QUADRANT OF LEFT FEMALE BREAST (HCC): ICD-10-CM

## 2017-03-28 DIAGNOSIS — Z00.00 ENCOUNTER FOR GENERAL ADULT MEDICAL EXAMINATION WITHOUT ABNORMAL FINDINGS: ICD-10-CM

## 2017-03-28 DIAGNOSIS — F41.9 ANXIETY DISORDER: ICD-10-CM

## 2017-03-28 DIAGNOSIS — I10 ESSENTIAL (PRIMARY) HYPERTENSION: ICD-10-CM

## 2017-03-28 DIAGNOSIS — G47.00 INSOMNIA: ICD-10-CM

## 2017-03-28 DIAGNOSIS — Z51.11 ENCOUNTER FOR ANTINEOPLASTIC CHEMOTHERAPY: ICD-10-CM

## 2017-03-28 DIAGNOSIS — C50.812 MALIGNANT NEOPLASM OF OVERLAPPING SITES OF LEFT FEMALE BREAST (HCC): ICD-10-CM

## 2017-03-28 PROCEDURE — A9560 TC99M LABELED RBC: HCPCS

## 2017-03-28 PROCEDURE — 78472 GATED HEART PLANAR SINGLE: CPT

## 2017-03-29 RX ORDER — SODIUM CHLORIDE 9 MG/ML
20 INJECTION, SOLUTION INTRAVENOUS CONTINUOUS
Status: DISCONTINUED | OUTPATIENT
Start: 2017-03-30 | End: 2017-04-02 | Stop reason: HOSPADM

## 2017-03-30 ENCOUNTER — HOSPITAL ENCOUNTER (OUTPATIENT)
Dept: INFUSION CENTER | Facility: CLINIC | Age: 52
Discharge: HOME | End: 2017-03-30
Payer: COMMERCIAL

## 2017-03-30 VITALS
SYSTOLIC BLOOD PRESSURE: 139 MMHG | TEMPERATURE: 98.4 F | WEIGHT: 109.13 LBS | DIASTOLIC BLOOD PRESSURE: 72 MMHG | RESPIRATION RATE: 18 BRPM | BODY MASS INDEX: 22.04 KG/M2 | HEART RATE: 81 BPM

## 2017-03-30 PROCEDURE — 96413 CHEMO IV INFUSION 1 HR: CPT

## 2017-03-30 RX ADMIN — Medication 300 UNITS: at 11:33

## 2017-03-30 RX ADMIN — SODIUM CHLORIDE 20 ML/HR: 0.9 INJECTION, SOLUTION INTRAVENOUS at 10:26

## 2017-03-30 RX ADMIN — TRASTUZUMAB 291 MG: KIT at 10:27

## 2017-03-30 NOTE — PLAN OF CARE
Problem: Potential for Falls  Goal: Patient will remain free of falls  INTERVENTIONS:  - Assess patient frequently for physical needs  - Identify cognitive and physical deficits and behaviors that affect risk of falls    - Rew fall precautions as indicated by assessment   - Educate patient/family on patient safety including physical limitations  - Instruct patient to call for assistance with activity based on assessment  - Modify environment to reduce risk of injury  - Consider OT/PT consult to assist with strengthening/mobility   Outcome: Progressing

## 2017-04-03 ENCOUNTER — APPOINTMENT (OUTPATIENT)
Dept: RADIATION ONCOLOGY | Facility: CLINIC | Age: 52
End: 2017-04-03
Attending: RADIOLOGY
Payer: COMMERCIAL

## 2017-04-03 ENCOUNTER — GENERIC CONVERSION - ENCOUNTER (OUTPATIENT)
Dept: OTHER | Facility: OTHER | Age: 52
End: 2017-04-03

## 2017-04-03 PROCEDURE — 99213 OFFICE O/P EST LOW 20 MIN: CPT | Performed by: RADIOLOGY

## 2017-04-06 ENCOUNTER — ALLSCRIPTS OFFICE VISIT (OUTPATIENT)
Dept: OTHER | Facility: OTHER | Age: 52
End: 2017-04-06

## 2017-04-19 RX ORDER — SODIUM CHLORIDE 9 MG/ML
20 INJECTION, SOLUTION INTRAVENOUS CONTINUOUS
Status: DISCONTINUED | OUTPATIENT
Start: 2017-04-20 | End: 2017-04-23 | Stop reason: HOSPADM

## 2017-04-20 ENCOUNTER — HOSPITAL ENCOUNTER (OUTPATIENT)
Dept: INFUSION CENTER | Facility: CLINIC | Age: 52
Discharge: HOME | End: 2017-04-20
Payer: COMMERCIAL

## 2017-04-20 VITALS
BODY MASS INDEX: 22.22 KG/M2 | TEMPERATURE: 97.2 F | HEART RATE: 82 BPM | SYSTOLIC BLOOD PRESSURE: 147 MMHG | RESPIRATION RATE: 16 BRPM | WEIGHT: 110.01 LBS | DIASTOLIC BLOOD PRESSURE: 80 MMHG

## 2017-04-20 PROCEDURE — 96413 CHEMO IV INFUSION 1 HR: CPT

## 2017-04-20 RX ORDER — VENLAFAXINE 37.5 MG/1
37.5 TABLET ORAL DAILY
COMMUNITY
End: 2018-09-10

## 2017-04-20 RX ADMIN — Medication 300 UNITS: at 11:31

## 2017-04-20 RX ADMIN — SODIUM CHLORIDE 20 ML/HR: 0.9 INJECTION, SOLUTION INTRAVENOUS at 10:25

## 2017-04-20 RX ADMIN — TRASTUZUMAB 291 MG: KIT at 10:25

## 2017-04-20 NOTE — PLAN OF CARE
Problem: Potential for Falls  Goal: Patient will remain free of falls  INTERVENTIONS:  - Assess patient frequently for physical needs  - Identify cognitive and physical deficits and behaviors that affect risk of falls    - [place] fall precautions as indicated by assessment   - Educate patient/family on patient safety including physical limitations  - Instruct patient to call for assistance with activity based on assessment  - Modify environment to reduce risk of injury  - Consider OT/PT consult to assist with strengthening/mobility   Outcome: Progressing

## 2017-04-20 NOTE — PLAN OF CARE
Problem: Potential for Falls  Goal: Patient will remain free of falls  INTERVENTIONS:  - Assess patient frequently for physical needs  - Identify cognitive and physical deficits and behaviors that affect risk of falls    - [place] fall precautions as indicated by assessment   - Educate patient/family on patient safety including physical limitations  - Instruct patient to call for assistance with activity based on assessment  - Modify environment to reduce risk of injury  - Consider OT/PT consult to assist with strengthening/mobility   Outcome: Progressing

## 2017-05-05 ENCOUNTER — ALLSCRIPTS OFFICE VISIT (OUTPATIENT)
Dept: OTHER | Facility: OTHER | Age: 52
End: 2017-05-05

## 2017-05-05 DIAGNOSIS — C50.412 MALIGNANT NEOPLASM OF UPPER-OUTER QUADRANT OF LEFT FEMALE BREAST (HCC): ICD-10-CM

## 2017-05-05 DIAGNOSIS — C50.812 MALIGNANT NEOPLASM OF OVERLAPPING SITES OF LEFT FEMALE BREAST (HCC): ICD-10-CM

## 2017-05-09 ENCOUNTER — HOSPITAL ENCOUNTER (OUTPATIENT)
Dept: MAMMOGRAPHY | Facility: CLINIC | Age: 52
Discharge: HOME | End: 2017-05-09
Payer: COMMERCIAL

## 2017-05-09 ENCOUNTER — HOSPITAL ENCOUNTER (OUTPATIENT)
Dept: ULTRASOUND IMAGING | Facility: CLINIC | Age: 52
Discharge: HOME | End: 2017-05-09
Payer: COMMERCIAL

## 2017-05-09 DIAGNOSIS — C50.812 MALIGNANT NEOPLASM OF OVERLAPPING SITES OF LEFT FEMALE BREAST (HCC): ICD-10-CM

## 2017-05-09 DIAGNOSIS — Z85.3 PERSONAL HISTORY OF MALIGNANT NEOPLASM OF BREAST: ICD-10-CM

## 2017-05-09 DIAGNOSIS — C50.412 MALIGNANT NEOPLASM OF UPPER-OUTER QUADRANT OF LEFT FEMALE BREAST (HCC): ICD-10-CM

## 2017-05-09 PROCEDURE — G0206 DX MAMMO INCL CAD UNI: HCPCS

## 2017-05-09 PROCEDURE — 76642 ULTRASOUND BREAST LIMITED: CPT

## 2017-05-10 RX ORDER — SODIUM CHLORIDE 9 MG/ML
20 INJECTION, SOLUTION INTRAVENOUS CONTINUOUS
Status: DISCONTINUED | OUTPATIENT
Start: 2017-05-11 | End: 2017-05-14 | Stop reason: HOSPADM

## 2017-05-11 ENCOUNTER — HOSPITAL ENCOUNTER (OUTPATIENT)
Dept: INFUSION CENTER | Facility: CLINIC | Age: 52
Discharge: HOME | End: 2017-05-11
Payer: COMMERCIAL

## 2017-05-11 VITALS — WEIGHT: 111.33 LBS | BODY MASS INDEX: 22.49 KG/M2

## 2017-05-11 PROCEDURE — 96413 CHEMO IV INFUSION 1 HR: CPT

## 2017-05-11 RX ADMIN — SODIUM CHLORIDE 20 ML/HR: 0.9 INJECTION, SOLUTION INTRAVENOUS at 11:02

## 2017-05-11 RX ADMIN — Medication 300 UNITS: at 12:18

## 2017-05-11 RX ADMIN — TRASTUZUMAB 291 MG: KIT at 11:02

## 2017-05-11 NOTE — PLAN OF CARE

## 2017-05-31 RX ORDER — SODIUM CHLORIDE 9 MG/ML
20 INJECTION, SOLUTION INTRAVENOUS CONTINUOUS
Status: DISCONTINUED | OUTPATIENT
Start: 2017-06-01 | End: 2017-06-04 | Stop reason: HOSPADM

## 2017-06-01 ENCOUNTER — HOSPITAL ENCOUNTER (OUTPATIENT)
Dept: INFUSION CENTER | Facility: CLINIC | Age: 52
Discharge: HOME | End: 2017-06-01
Payer: COMMERCIAL

## 2017-06-01 VITALS
HEART RATE: 86 BPM | BODY MASS INDEX: 21.73 KG/M2 | RESPIRATION RATE: 16 BRPM | DIASTOLIC BLOOD PRESSURE: 68 MMHG | WEIGHT: 107.58 LBS | TEMPERATURE: 97.2 F | SYSTOLIC BLOOD PRESSURE: 117 MMHG

## 2017-06-01 PROCEDURE — 96413 CHEMO IV INFUSION 1 HR: CPT

## 2017-06-01 RX ADMIN — SODIUM CHLORIDE 20 ML/HR: 0.9 INJECTION, SOLUTION INTRAVENOUS at 10:31

## 2017-06-01 RX ADMIN — Medication 300 UNITS: at 12:03

## 2017-06-01 RX ADMIN — TRASTUZUMAB 305 MG: KIT at 11:01

## 2017-06-01 NOTE — PROGRESS NOTES
Patient tolerated last herceptin  Denies any discomforts  Patient has follow up with Dr Johnson Ervin in August 2017  Patient has appointment to get port taken out   No further infusion appointments scheduled at this time

## 2017-06-16 ENCOUNTER — ALLSCRIPTS OFFICE VISIT (OUTPATIENT)
Dept: OTHER | Facility: OTHER | Age: 52
End: 2017-06-16

## 2017-08-17 ENCOUNTER — ALLSCRIPTS OFFICE VISIT (OUTPATIENT)
Dept: OTHER | Facility: OTHER | Age: 52
End: 2017-08-17

## 2017-12-15 ENCOUNTER — TRANSCRIBE ORDERS (OUTPATIENT)
Dept: ADMINISTRATIVE | Facility: HOSPITAL | Age: 52
End: 2017-12-15

## 2017-12-15 ENCOUNTER — ALLSCRIPTS OFFICE VISIT (OUTPATIENT)
Dept: OTHER | Facility: OTHER | Age: 52
End: 2017-12-15

## 2017-12-15 DIAGNOSIS — C50.812 MALIGNANT NEOPLASM OF OVERLAPPING SITES OF LEFT FEMALE BREAST, UNSPECIFIED ESTROGEN RECEPTOR STATUS (HCC): Primary | ICD-10-CM

## 2017-12-15 PROCEDURE — 88305 TISSUE EXAM BY PATHOLOGIST: CPT | Performed by: SURGERY

## 2017-12-16 NOTE — PROGRESS NOTES
Assessment  1  History of Malignant neoplasm of overlapping sites of left female breast (174 8) (H04 819)    Plan  Malignant neoplasm of overlapping sites of left female breast    · Follow-up visit in 1 week Evaluation and Treatment  Follow-up  Status: Hold For -Scheduling  Requested for: 93RRF8469   Ordered; For: Malignant neoplasm of overlapping sites of left female breast; Ordered By: Tracy Rosenberg Performed:  Due: 87GZE6977   · Follow-up visit in 6 months Evaluation and Treatment  Follow-up  Status: Hold For -Scheduling  Requested for: 08CGW5746   Ordered; For: Malignant neoplasm of overlapping sites of left female breast; Ordered By: Tracy Rosenberg Performed:  Due: 25TAO1775   · MAMMO DIAGNOSTIC RIGHT W 3D & CAD; Status:Active; Requested for:08Axi8416; Perform:[facility]; DGY:70GOG1122;RSPKKZK; For:Malignant neoplasm of overlapping sites of left female breast; Ordered By:Daniel Xiong; Discussion/Summary  Discussion Summary:   History of left breast cancer status post neoadjuvant chemotherapy and mastectomy  Biopsy done of a nodular skin lesion at the mastectomy site today  Plan of 1 week follow-up to go over results and for suture removal  She is due for mammogram otherwise in May of 2018 regarding the right breast    Counseling Documentation With Imm: The patient, patient's family was counseled regarding diagnostic results,-- instructions for management,-- impressions,-- importance of compliance with treatment  Goals and Barriers: The patient has the current Goals: Surveillance  The patent has the current Barriers: None  Patient's Capacity to Self-Care: Patient is able to Self-Care  Patient agrees and allows to involve family/caregiver in development of care plan:      Chief Complaint  Chief Complaint Free Text Note Form: Pt is here for her 6 month breast follow-up no new complaints at this time        History of Present Illness  Diagnosis and Staging: Initial T4N1M0 left breast cancer Treatment History: Neoadjuvant chemotherapy followed by left modified radical mastectomy 2016    Current Therapy: tamoxifen   Interval History: Mrs Jay Bautista is a 60-year-old woman here for a surveillance visit    She is otherwise doing well and has no major complaints to report  She had her right-sided mammogram in May of this year         Review of Systems  Complete Female ROS SurgOnc:  Constitutional: The patient denies new or recent history of general fatigue, no recent weight loss, no change in appetite  Eyes: No complaints of visual problems, no scleral icterus  ENT: no complaints of ear pain, no hoarseness, no difficulty swallowing,-- no tinnitus-- and-- no new masses in head, oral cavity, or neck  Cardiovascular: No complaints of chest pain, no palpitations, no ankle edema  Respiratory: No complaints of shortness of breath, no cough  Gastrointestinal: No complaints of jaundice, no bloody stools, no pale stools  Genitourinary: No complaints of dysuria, no hematuria, no nocturia, no frequent urination, no urethral discharge  Musculoskeletal: No complaints of weakness, paralysis, joint stiffness or arthralgias,  Integumentary: No complaints of rash, no new lesions  Neurological: No complaints of convulsions, no seizures, no dizziness  Hematologic/Lymphatic: No complaints of easy bruising  ROS Reviewed:   ROS reviewed  Active Problems  1  Admission for antineoplastic chemotherapy (V58 11) (Z51 11)   2  Anxiety (300 00) (F41 9)   3  Benign essential hypertension (401 1) (I10)   4  Insomnia (780 52) (G47 00)   5  Port-a-cath in place (V45 89) (D58 858)    Past Medical History  1  History of  5   2  History of chemotherapy (V87 41) (Z92 21)   3  History of radiation therapy (V15 3) (Z92 3)   4  History of Malignant neoplasm of overlapping sites of left female breast (174 8) (C50 812)   5   History of 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 (174  4) (C59 904)    Surgical History  1  History of  Section   2  History of Modified Radical Mastectomy Left Breast  Surgical History Reviewed: The surgical history was reviewed and updated today  Family History  Father    1  Family history of malignant neoplasm of prostate (V16 42) (Z80 42)  Maternal Grandmother    2  Family history of malignant neoplasm (V16 9) (Z80 9)  Family History Reviewed: The family history was reviewed and updated today  Social History   · Five children   · Never a smoker   · No alcohol use   · Single  Social History Reviewed: The social history was reviewed and updated today  The social history was reviewed and is unchanged  Current Meds   1  AmLODIPine Besylate 10 MG Oral Tablet; TAKE 1 TABLET DAILY  Requested for: 89HYJ0227; Last Rx:04Ita6859 Ordered   2  Metoclopramide HCl - 10 MG Oral Tablet; one q6hrs prn nausea; Therapy: 87Esl5207 to (Last Rx:95Svp8427)  Requested for: 52Olq0412 Ordered   3  Metoprolol Tartrate 25 MG Oral Tablet; TAKE 1 TABLET TWICE DAILY; Therapy: 75OPH6237 to (Evaluate:2016)  Requested for: 00MMN8987; Last Rx:28Fgd3579 Ordered   4  Oxycodone-Acetaminophen  MG Oral Tablet; TAKE 1 TABLET EVERY 4 TO 6 HOURS AS NEEDED FOR PAIN; Therapy: 72DMR4115 to (Last Rx:79Wmf5059) Ordered   5  Tamoxifen Citrate 20 MG Oral Tablet; TAKE 1 TABLET DAILY; Therapy: 58CKE1179 to (Evaluate:06Cgp5477)  Requested for: 08Lmo8132; Last Rx:33Scp4624 Ordered   6  Venlafaxine HCl ER 37 5 MG Oral Tablet Extended Release 24 Hour; Take 1 tablet daily; Therapy: 14AIM8962 to (Evaluate:23Ltx6386)  Requested for: 28ING5048; Last Rx:19Qop3536 Ordered   7  Vistaril 25 MG Oral Capsule; Therapy: (Recorded:2016) to Recorded  Medication List Reviewed: The medication list was reviewed and updated today  Allergies  1   No Known Drug Allergies    Vitals  Vital Signs    Recorded: 62Jfh4296 10:11AM   Temperature 98 4 F   Heart Rate 88   Respiration 14 Systolic 468   Diastolic 80   Height 4 ft 10 in   Weight 124 lb    BMI Calculated 25 92   BSA Calculated 1 49   O2 Saturation 98     Physical Exam   Constitutional: General appearance: The Patient is well-developed, well-nourished female who appears her stated age in no acute distress  She is pleasant and talkative  HEENT: Conjunctiva and lids: No swelling, erythema, or discharge  -- Sclerae are anicteric  -- Neck is supple without adenopathy  No JVD  Chest: There are bilaterally symmetrical breath sounds  -- The lungs are clear to auscultation  Cardiac: Heart is regular rate  Abdomen: Abdomen is soft, nontender without masses  -- There is no evidence of hepatosplenomegaly  Extremities: There is no clubbing or cyanosis  -- Inspection/palpation of joints, bones, and muscles: Normal    Neuro: Grossly nonfocal  -- Reflexes: 2+ and symmetric  -- The Cranial Nerves II - XII are grossly intact  -- Sensation is intact to light touch  Skin: Examination of Breast Normal  -- Right breast axilla normal to inspection and palpation left breast mastectomy site well healed  There is a slight nodularity along the middle portion of the incision  Procedure  the area of nodularity in the left mastectomy scar was prepped and anesthetized  4 mm punch biopsy was then done  The biopsy site was closed with a 3 0 nylon suture  Dressings were placed in the usual fashion  Patient tolerated procedure well  Future Appointments    Date/Time Provider Specialty Site   02/19/2018 10:20 AM KEN Landis  Hematology [facility]cology [facility]     End of Encounter Meds  1  Venlafaxine HCl ER 37 5 MG Oral Tablet Extended Release 24 Hour; Take 1 tablet daily; Therapy: 60KFW2981 to (Evaluate:34Uqi4832)  Requested for: 07XBO5510; Last Rx:06Apr2017 Ordered  2  AmLODIPine Besylate 10 MG Oral Tablet; TAKE 1 TABLET DAILY  Requested for: 49LCD8461; Last Rx:16Jun2016 Ordered   3   Metoprolol Tartrate 25 MG Oral Tablet; TAKE 1 TABLET TWICE DAILY; Therapy: 17ATQ0588 to (Evaluate:30Jun2016)  Requested for: 63YFN7642; Last Rx:16Jun2016 Ordered  4  Metoclopramide HCl - 10 MG Oral Tablet; one q6hrs prn nausea; Therapy: 67Cgb6235 to (Last Rx:49Iip7401)  Requested for: 04Ezq6404 Ordered   5  Oxycodone-Acetaminophen  MG Oral Tablet (Percocet); TAKE 1 TABLET EVERY 4 TO 6 HOURS AS NEEDED FOR PAIN; Therapy: 45QZR3300 to (Last Rx:54Qzd0971) Ordered   6  Tamoxifen Citrate 20 MG Oral Tablet; TAKE 1 TABLET DAILY; Therapy: 91RKK3411 to (Evaluate:79Sdw3734)  Requested for: 17Aug2017; Last Rx:17Aug2017 Ordered  7  Vistaril 25 MG Oral Capsule (HydrOXYzine Pamoate);  Therapy: (Recorded:09Nov2016) to Recorded    Signatures   Electronically signed by : Mejia Gilman MD; Dec 15 2017 10:55AM EST                       (Author)

## 2017-12-18 ENCOUNTER — LAB REQUISITION (OUTPATIENT)
Dept: LAB | Facility: HOSPITAL | Age: 52
End: 2017-12-18
Payer: COMMERCIAL

## 2017-12-18 DIAGNOSIS — L98.9 DISORDER OF SKIN OR SUBCUTANEOUS TISSUE: ICD-10-CM

## 2017-12-22 ENCOUNTER — GENERIC CONVERSION - ENCOUNTER (OUTPATIENT)
Dept: OTHER | Facility: OTHER | Age: 52
End: 2017-12-22

## 2017-12-22 DIAGNOSIS — C50.812 MALIGNANT NEOPLASM OF OVERLAPPING SITES OF LEFT FEMALE BREAST (HCC): ICD-10-CM

## 2018-01-10 NOTE — PROGRESS NOTES
Preliminary Nursing Report                Patient Information    Initial Encounter Entry Date:   2016 11:24 AM EST (Automated Transmission Automated Transmission)       Last Modified:   {Armaan Mendoza}              Legal Name: Aldair Zavala [facility]        Social Security Number:        YOB: 1965        Age (years): 49        Gender: F        Body Mass Index (BMI): 1 kg/m2        Height: 58 in  Weight: 6 56 lbs (3 kgs)           Address:   [address] [place] [address]              Phone: [phone]   (consent to leave messages)        Email:        Ethnicity: Decline to State        Congregational:        Marital Status:        Preferred Language: Hebrew        Race: Other Race                    Patient Insurance Information        Primary Insurance Information Carrier Name: {Primary  CarrierName}           Carrier Address:   {Primary  CarrierAddress}              Carrier Phone: {Primary  CarrierPhone}          Group Number: {Primary  GroupNumber}          Policy Number: {Primary  PolicyNumber}          Insured Name: {Primary  InsuredName}          Insured : {Primary  InsuredDOB}          Relationship to Insured: {Primary  RelationshiptoInsured}           Secondary Insurance Information Carrier Name: {Secondary  CarrierName}           Carrier Address:   {Secondary  CarrierAddress}              Carrier Phone: {Secondary  CarrierPhone}          Group Number: {Secondary  GroupNumber}          Policy Number: {Secondary  PolicyNumber}          Insured Name: {Secondary  InsuredName}          Insured : {Secondary  InsuredDOB}          Relationship to Insured: {Secondary  RelationshiptoInsured}                       Health Profile   Booking #:   Justo Thomas #: [id number]               DOS: 10/25/2016    Surgery : MAST, MOD RAD    Add'l Procedures/Notes:     Surgery Risk: Intermediate          Precautions     BMI                   Allergies    No Known Drug Allergies Medications    AmLODIPine Besylate 10 MG Oral Tablet       ClonazePAM 1 MG Oral Tablet       Metoclopramide HCl - 10 MG Oral Tablet       Metoprolol Tartrate 25 MG Oral Tablet       Oxycodone-Acetaminophen  MG Oral Tablet               Conditions    Admission for antineoplastic chemotherapy       Anxiety       Benign essential hypertension       Insomnia       Malignant neoplasm of overlapping sites of left female breast       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 NPO Instructions   The day before surgery it is recommended to have a light dinner at your usual time and you are allowed a light snack early in the evening  Do not eat anything heavy or eat a big meal after 7pm  Do not eat or drink anything after midnight prior to your surgery  If you are supposed to take any of your medications, do so with a sip of water  Failure to follow these instructions can lead to an increased risk of lung complications and may result in a delay or cancellation of your procedure  If you have any questions, contact your institution for further instructions  No candy, no gum, no mints, no chewing tobacco   Triggered by: Medical Procedure Risk         ? Benzodiazepine 15  Please continue the following medications, if needed, up to and including the day of surgery (with a sip of water)  Triggered by: ClonazePAM 1 MG Oral Tablet         ? Beta Blocker 3, 2, c, 4, 6, 5  Please continue to take this medication on your normal schedule  If this is an oral medication and you take in the morning, you may do so with a sip of water  Triggered by: Metoprolol Tartrate 25 MG Oral Tablet         ?  Calcium Blocker (Blood Pressure Medication) 3, 4, 6, 5, 2  Please continue to take this medication on your normal schedule  If this is an oral medication and you take in the morning, you may do so with a sip of water  Triggered by: AmLODIPine Besylate 10 MG Oral Tablet         ? Opioids (Pain Medication) 62  Please continue the following medications, if needed, up to and including the day of surgery (with a sip of water)  Triggered by: Oxycodone-Acetaminophen  MG Oral Tablet               Testing Considerations       ? Basic Metabolic Panel (BMP) t  If test was completed and normal within last six months, repeat test is not necessary  Triggered by: Benign essential hypertension         ? Complete Blood Count (CBC) t, client, client  If test was completed and normal within last six months, repeat test is not necessary  Triggered by: Age or Facility Rec         ? Electrocardiogram (ECG) t  Patient does not need new test if normal ECG is present within the last six months and no change in clinical condition  Triggered by: Benign essential hypertension         ? Type and Screen client  Type and Screen - Blood: If there is anticipated or possible large blood loss with this procedure, then a Type and Screen for Blood should be ordered  Triggered by: Age or Facility Rec               Consultations       ? 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 Answer: YES                   Allergies/Conditions/Medications Not Found        The following were not recognized by our system when generating the recommendations   Please consider if this would impact any preoperative protocols  ? Never a smoker       ? No alcohol use       ? Metoclopramide HCl - 10 MG Oral Tablet                  Appointment Information         Date:    10/25/2016        Location:    [place]        Address:           Directions:                      Footnotes revision 14      ?? Denotes a free-text entry  Legal Disclaimer: Any and all recommendations and services provided herein are designed to assist in the preoperative care of the patient  Nothing contained herein is designed to replace, eliminate or alleviate the responsibility of the attending physician to supervise and determine the patient?s preoperative care and course of treatment  Failure to provide complete, accurate information may negatively impact the system?s ability to recommend the proper preoperative protocol  THE ATTENDING PHYSICIAN IS RESPONSIBLE TO REVIEW THE SUGGESTED PREOPERATIVE PROTOCOLS/COURSE OF TREATMENT AND PRESCRIBE THE FINAL COURSE OF PREOPERATIVE TREATMENT IN CONSULTATION WITH THE PATIENT  THE ePREOP SYSTEM AND ITS MATERIALS ARE PROVIDED ? AS IS? WITHOUT WARRANTY OF ANY KIND, EXPRESS OR IMPLIED, INCLUDING, BUT NOT LIMITED TO, WARRANTIES OF PERFORMANCE OR MERCHANTABILITY OR FITNESS FOR A PARTICULAR PURPOSE  PATIENT AND PHYSICIANS HEREBY AGREE THAT THEIR USE OF THE MATERIALS AND RESOURCES ACT AS A CONSENT TO RELEASE AND WAIVE ePREOP FROM ANY AND ALL CLAIMS OF WARRANTY, TORT OR CONTRACT LAW OF ANY KIND             Electronically signed by:Mike Worley MD  Sep 29 2016  3:56PM EST

## 2018-01-12 VITALS
BODY MASS INDEX: 21.97 KG/M2 | SYSTOLIC BLOOD PRESSURE: 124 MMHG | HEIGHT: 59 IN | OXYGEN SATURATION: 99 % | RESPIRATION RATE: 13 BRPM | HEART RATE: 104 BPM | DIASTOLIC BLOOD PRESSURE: 80 MMHG | TEMPERATURE: 98 F | WEIGHT: 109 LBS

## 2018-01-12 VITALS
BODY MASS INDEX: 21.77 KG/M2 | HEART RATE: 100 BPM | WEIGHT: 108 LBS | HEIGHT: 59 IN | TEMPERATURE: 98.9 F | OXYGEN SATURATION: 100 % | RESPIRATION RATE: 16 BRPM | SYSTOLIC BLOOD PRESSURE: 118 MMHG | DIASTOLIC BLOOD PRESSURE: 80 MMHG

## 2018-01-12 VITALS
OXYGEN SATURATION: 100 % | HEART RATE: 87 BPM | BODY MASS INDEX: 21.53 KG/M2 | HEIGHT: 59 IN | SYSTOLIC BLOOD PRESSURE: 130 MMHG | DIASTOLIC BLOOD PRESSURE: 78 MMHG | RESPIRATION RATE: 16 BRPM | WEIGHT: 106.8 LBS

## 2018-01-12 VITALS
RESPIRATION RATE: 14 BRPM | DIASTOLIC BLOOD PRESSURE: 74 MMHG | WEIGHT: 112 LBS | HEIGHT: 59 IN | BODY MASS INDEX: 22.58 KG/M2 | TEMPERATURE: 98.3 F | HEART RATE: 95 BPM | OXYGEN SATURATION: 99 % | SYSTOLIC BLOOD PRESSURE: 114 MMHG

## 2018-01-12 VITALS
BODY MASS INDEX: 21.57 KG/M2 | TEMPERATURE: 97.7 F | RESPIRATION RATE: 16 BRPM | HEIGHT: 59 IN | HEART RATE: 88 BPM | SYSTOLIC BLOOD PRESSURE: 118 MMHG | OXYGEN SATURATION: 100 % | WEIGHT: 107 LBS | DIASTOLIC BLOOD PRESSURE: 80 MMHG

## 2018-01-12 NOTE — MISCELLANEOUS
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 (174 4)   (C50 412)    Current Meds   1  AmLODIPine Besylate 10 MG Oral Tablet; TAKE 1 TABLET DAILY  Requested for:   13OXD7344; Last Rx:16Jun2016 Ordered   2  ClonazePAM 1 MG Oral Tablet; Therapy: (Sarita Cameron) to Recorded   3  Metoclopramide HCl - 10 MG Oral Tablet; one q6hrs prn nausea; Therapy: 23Vdn5738 to (Last Rx:37Jbn2557)  Requested for: 88Cpf6978 Ordered   4  Metoprolol Tartrate 25 MG Oral Tablet; TAKE 1 TABLET TWICE DAILY; Therapy: 96TNK0142 to (Evaluate:30Jun2016)  Requested for: 80VWC8496; Last   Rx:16Jun2016 Ordered   5  Oxycodone-Acetaminophen  MG Oral Tablet; TAKE 1 TABLET EVERY 4 TO 6   HOURS AS NEEDED FOR PAIN;   Therapy: 73CQG5487 to (Last Rx:83Uid8630) Ordered    Allergies    1   No Known Drug Allergies    Signatures   Electronically signed by : Oscar Hudson, ; Oct 31 2016  1:16PM EST                       (Author)

## 2018-01-14 VITALS
TEMPERATURE: 98.3 F | DIASTOLIC BLOOD PRESSURE: 74 MMHG | RESPIRATION RATE: 15 BRPM | OXYGEN SATURATION: 99 % | SYSTOLIC BLOOD PRESSURE: 118 MMHG | BODY MASS INDEX: 21.57 KG/M2 | WEIGHT: 107 LBS | HEART RATE: 89 BPM | HEIGHT: 59 IN

## 2018-01-16 NOTE — PROGRESS NOTES
Preliminary Nursing Report                Patient Information    Initial Encounter Entry Date:   2016 9:43 AM EST (Automated Transmission Automated Transmission)       Last Modified:   {Armaan Mendoza}              Legal Name: Lainey [facility]        Social Security Number:        YOB: 1965        Age (years): 49        Gender: F        Body Mass Index (BMI): 23 kg/m2        Height: 59 in  Weight: 112 lbs (51 kgs)           Address:   [address] [place] [address]              Phone: [phone]   (consent to leave messages)        Email:        Ethnicity: Decline to State        Protestant:        Marital Status:        Preferred Language: Samoan        Race: Other Race                    Patient Insurance Information        Primary Insurance Information Carrier Name: {Primary  CarrierName}           Carrier Address:   {Primary  CarrierAddress}              Carrier Phone: {Primary  CarrierPhone}          Group Number: {Primary  GroupNumber}          Policy Number: {Primary  PolicyNumber}          Insured Name: {Primary  InsuredName}          Insured : {Primary  InsuredDOB}          Relationship to Insured: {Primary  RelationshiptoInsured}           Secondary Insurance Information Carrier Name: {Secondary  CarrierName}           Carrier Address:   {Secondary  CarrierAddress}              Carrier Phone: {Secondary  CarrierPhone}          Group Number: {Secondary  GroupNumber}          Policy Number: {Secondary  PolicyNumber}          Insured Name: {Secondary  InsuredName}          Insured : {Secondary  InsuredDOB}          Relationship to Insured: {Secondary  RelationshiptoInsured}                       Health Profile   Booking #:   Hilario Gamez #: [id number]               DOS: 2016    Surgery : INSERT TUNNELED CV CATH        Add'l Procedures/Notes:     Surgery Risk: Intermediate          Precautions          Allergies    No Known Drug Allergies             Medications AmLODIPine Besylate 10 MG Oral Tablet       ClonazePAM 1 MG Oral Tablet       Metoclopramide HCl - 10 MG Oral Tablet               Conditions    Anxiety       Benign essential hypertension       Insomnia       Malignant neoplasm of overlapping sites of left female breast       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 NPO Instructions   The day before surgery it is recommended to have a light dinner at your usual time and you are allowed a light snack early in the evening  Do not eat anything heavy or eat a big meal after 7pm  Do not eat or drink anything after midnight prior to your surgery  If you are supposed to take any of your medications, do so with a sip of water  Failure to follow these instructions can lead to an increased risk of lung complications and may result in a delay or cancellation of your procedure  If you have any questions, contact your institution for further instructions  No candy, no gum, no mints, no chewing tobacco   Triggered by: Medical Procedure Risk         ? Benzodiazepine 15  Please continue the following medications, if needed, up to and including the day of surgery (with a sip of water)  Triggered by: ClonazePAM 1 MG Oral Tablet         ? Calcium Blocker (Blood Pressure Medication) 3, 4, 6, 5, 2  Please continue to take this medication on your normal schedule  If this is an oral medication and you take in the morning, you may do so with a sip of water  Triggered by: AmLODIPine Besylate 10 MG Oral Tablet               Testing Considerations       ? Basic Metabolic Panel (BMP) t  If test was completed and normal within last six months, repeat test is not necessary  Triggered by: Benign essential hypertension         ?  Electrocardiogram (ECG) t  Patient does not need new test if normal ECG is present within the last six months and no change in clinical condition  Triggered by: Benign essential hypertension               Consultations       ? 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 Answer: YES                   Allergies/Conditions/Medications Not Found        The following were not recognized by our system when generating the recommendations  Please consider if this would impact any preoperative protocols  ? Never a smoker       ? No alcohol use       ? Metoclopramide HCl - 10 MG Oral Tablet                  Appointment Information         Date:    04/26/2016        Location:    [facility]        Address:           Directions:                      Footnotes revision 14      ?? Denotes a free-text entry  Legal Disclaimer: Any and all recommendations and services provided herein are designed to assist in the preoperative care of the patient  Nothing contained herein is designed to replace, eliminate or alleviate the responsibility of the attending physician to supervise and determine the patient?s preoperative care and course of treatment  Failure to provide complete, accurate information may negatively impact the system?s ability to recommend the proper preoperative protocol  THE ATTENDING PHYSICIAN IS RESPONSIBLE TO REVIEW THE SUGGESTED PREOPERATIVE PROTOCOLS/COURSE OF TREATMENT AND PRESCRIBE THE FINAL COURSE OF PREOPERATIVE TREATMENT IN CONSULTATION WITH THE PATIENT       THE ePREOP SYSTEM AND ITS MATERIALS ARE PROVIDED ? AS IS? WITHOUT WARRANTY OF ANY KIND, EXPRESS OR IMPLIED, INCLUDING, BUT NOT LIMITED TO, WARRANTIES OF PERFORMANCE OR MERCHANTABILITY OR FITNESS FOR A PARTICULAR PURPOSE  PATIENT AND PHYSICIANS HEREBY AGREE THAT THEIR USE OF THE MATERIALS AND RESOURCES ACT AS A CONSENT TO RELEASE AND WAIVE ePREOP FROM ANY AND ALL CLAIMS OF WARRANTY, TORT OR CONTRACT LAW OF ANY KIND             Electronically signed by:Mike Collazo MD  Apr 11 2016  3:58PM EST

## 2018-01-16 NOTE — PROGRESS NOTES
Assessment    1  Malignant neoplasm of overlapping sites of left female breast (174 8) (C50 541)    Plan  Admission for antineoplastic chemotherapy, Anxiety, Benign essential hypertension,  Health Maintenance, Insomnia, Malignant neoplasm of overlapping sites of  left female breast, Port-a-cath in place    · Follow-up visit in 6 months Evaluation and Treatment  Follow-up  Status: Hold For -  Scheduling  Requested for: [id number]   Ordered; For: Admission for antineoplastic chemotherapy, Anxiety, Benign essential hypertension, Health Maintenance, Insomnia, Malignant neoplasm of overlapping sites of left female breast, Port-a-cath in place; Ordered By: Jacqui Galarza Performed:  Due: 57NHD5103  Malignant neoplasm of overlapping sites of left female breast    · Tamoxifen Citrate 20 MG Oral Tablet; TAKE 1 TABLET DAILY   Rx By: Jacqui Galarza; Dispense: 90 Days ; #:90 Tablet; Refill: 1; For: Malignant neoplasm of overlapping sites of left female breast; ABIDA = N; Verified Transmission to [facility], [address]; Last Updated By: [employer]; 8/17/2017 10:53:34 AM    Discussion/Summary  Discussion Summary:   A 47year old premenopausal woman with locally advanced left breast cancer, grade 2, ER strongly positive, RI weakly positive, HER-2 3+ disease  She has quite large left breast mass, with no fixation to the chest wall  There is some skin change or the left breast mass, but no skin ulceration  She has no evidence of distant metastasis  She underwent neoadjuvant chemotherapy with dose dense AC , followed by weekly paclitaxel, trastuzumab and pertuzumab resulting in pathologically very good partial response  She had 0 5 mm of residual invasive ductal carcinoma with 18 negative lymph nodes  She completed trastuzumab monotherapy as adjuvant setting  She is currently on adjuvant hormonal therapy with tamoxifen with expected side effect with hot flashes   Based on physical examination, she has no evidence of recurrent disease  I recommended her to continue with tamoxifen 20 mg daily  She is in agreement with my recommendation  I will see her again in 6 months for routine follow-up  Chief Complaint  Chief Complaint: Chief Complaint:   The patient presents to the office today with Locally advanced left breast cancer, grade 2, ER positive, MS weakly positive, HER-2 3+ disease  Diagnosed in July 2015  Chief Complaint Free Text Note Form: Locally advanced left breast cancer, grade 2, ER positive, MS weakly positive, HER-2 3+ disease  Diagnosed in July 2015  History of Present Illness  HPI: A 49year old premenopausal woman who has been aware of left breast mass for 2-3 years, which has been growing  She underwent left breast biopsy in July 2015 in [facility], which showed invasive ductal carcinoma, ER positive, HER-2 3+ disease  She was seen by medical oncologist who put her on tamoxifen  She was on tamoxifen from December 2015 through March 2016 with some shrinkage of left breast mass  After she started tamoxifen, she stopped having menstrual cycle  Prior to this, she had regular menstrual cycle until November 2015  She recently came to [place], [place] to be with her daughter  She presented to Dr Juanita Paula office for surgical consultation, where she was found to have quite large left breast mass with some skin change  Dr Juanita Paula did metastatic workup  CT scan of chest, abdomen pelvis showed large left breast mass without any evidence of distant metastasis  Bone scan was negative  She also underwent mammography scan which showed ejection fraction 63%  She presents today to discuss neoadjuvant chemotherapy  She has no complaint of pain  However, she feels anxious  Her weight has been stable  She has no respiratory symptom  She has no other significant comorbidities except hypertension  Her performance status is normal    Previous Therapy:   1   Tamoxifen from December 2015 through March 2016 by previous oncologist in [facility]   2  Neoadjuvant chemotherapy with dose dense AC Ã4 followed by 12 weekly paclitaxel, trastuzumab, and triweekly pertuzumab  Completed in early September 2016  3  Mastectomy with lymph node dissection in October 25, 2016   4  Adjuvant trastuzumab monotherapy until June 2017  Current Therapy: 1  Adjuvant hormonal therapy with tamoxifen since February 2017  Disease Status: Clinical partial response  Very good pathological response with minimal invasive residual carcinoma  ANGELA status post mastectomy with lymph node dissection  Interval History: A 78-year-old premenopausal woman with locally advanced left breast cancer, grade 2, ER strongly positive, NM weakly positive, HER-2 3+ disease  She had quite large left breast mass as well as skin involvement  She was previously treated with tamoxifen by oncologist in [facility] before she moved to the [facility] area to be with her daughter  She has no evidence of metastatic disease  She completed neoadjuvant chemotherapy with AC , followed by paclitaxel, trastuzumab and pertuzumab with minimal toxicity  She achieved clinical partial response  She underwent left mastectomy with axillary lymph node dissection in October 25, 2016  Pathology showed 0 5 mm of residual invasive carcinoma with 18 negative axillary lymph node  This is consistent with very good pathological response  She completed trastuzumab monotherapy in June 2017 without cardiac toxicity  She is currently on adjuvant hormonal therapy with tamoxifen  She presented today for routine follow-up  She continued to have moderate hot flashes  She has good appetite, resulting in weight gain  She has no respiratory symptoms  She denied any pain  Her performance status is normal       Review of Systems  Complete-Female:   Constitutional: as noted in HPI  Eyes: No complaints of eye pain, no red eyes, no eyesight problems, no discharge, no dry eyes, no itching of eyes     ENT: no complaints of earache, no loss of hearing, no nose bleeds, no nasal discharge, no sore throat, no hoarseness  Cardiovascular: No complaints of slow heart rate, no fast heart rate, no chest pain, no palpitations, no leg claudication, no lower extremity edema  Respiratory: as noted in HPI  Gastrointestinal: No complaints of abdominal pain, no constipation, no nausea or vomiting, no diarrhea, no bloody stools  Genitourinary: No complaints of dysuria, no incontinence, no pelvic pain, no dysmenorrhea, no vaginal discharge or bleeding  Musculoskeletal: No complaints of arthralgias, no myalgias, no joint swelling or stiffness, no limb pain or swelling  Integumentary: No complaints of skin rash or lesions, no itching, no skin wounds, no breast pain or lump  Neurological: No complaints of headache, no confusion, no convulsions, no numbness, no dizziness or fainting, no tingling, no limb weakness, no difficulty walking  Psychiatric: as noted in HPI  Endocrine: No complaints of proptosis, no hot flashes, no muscle weakness, no deepening of the voice, no feelings of weakness  Hematologic/Lymphatic: No complaints of swollen glands, no swollen glands in the neck, does not bleed easily, does not bruise easily  ROS Reviewed:   ROS reviewed  Active Problems    1  Admission for antineoplastic chemotherapy (V58 11) (Z51 11)   2  Anxiety (300 00) (F41 9)   3  Benign essential hypertension (401 1) (I10)   4  Insomnia (780 52) (G47 00)   5  Malignant neoplasm of overlapping sites of left female breast (174 8) (C50 812)   6  Port-a-cath in place (V45 89) (R01 203)    Past Medical History    1  History of  5   2  History of chemotherapy (V87 41) (Z92 21)   3  History of radiation therapy (V15 3) (Z92 3)   4  History of 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   (174 4) (C50 412)  Active Problems And Past Medical History Reviewed:    The active problems and past medical history were reviewed and updated today  Surgical History    1  History of  Section   2  History of Modified Radical Mastectomy Left Breast  Surgical History Reviewed: The surgical history was reviewed and updated today  Family History  Father    1  Family history of malignant neoplasm of prostate (V16 42) (Z80 42)  Maternal Grandmother    2  Family history of malignant neoplasm (V16 9) (Z80 9)  Family History Reviewed: The family history was reviewed and updated today  Social History    · Five children   · Never a smoker   · No alcohol use   · Single  Social History Reviewed: The social history was reviewed and updated today  The social history was reviewed and is unchanged  Current Meds   1  AmLODIPine Besylate 10 MG Oral Tablet; TAKE 1 TABLET DAILY  Requested for:   59YMM5769; Last Rx:86Ytr1097 Ordered   2  Metoclopramide HCl - 10 MG Oral Tablet; one q6hrs prn nausea; Therapy: 21Ysg6632 to (Last Rx:13Ype1166)  Requested for: 75Uds1670 Ordered   3  Metoprolol Tartrate 25 MG Oral Tablet; TAKE 1 TABLET TWICE DAILY; Therapy: 71SIR7834 to (Evaluate:2016)  Requested for: 32TJB6735; Last   Rx:79Tcw9640 Ordered   4  Oxycodone-Acetaminophen  MG Oral Tablet; TAKE 1 TABLET EVERY 4 TO 6   HOURS AS NEEDED FOR PAIN;   Therapy: 30JZW3790 to (Last Rx:11Mev2959) Ordered   5  Tamoxifen Citrate 20 MG Oral Tablet; TAKE 1 TABLET DAILY; Therapy: 54AGB7974 to (IFPFJBJQ:76UZT5635)  Requested for: 20Lnh9686; Last   Rx:38Esa4031 Ordered   6  Venlafaxine HCl ER 37 5 MG Oral Tablet Extended Release 24 Hour; Take 1 tablet daily; Therapy: 07XRV7014 to (Evaluate:14Uay5627)  Requested for: 65XXF9819; Last   Rx:05Djd4779 Ordered   7  Vistaril 25 MG Oral Capsule; Therapy: (Recorded:2016) to Recorded  Medication List Reviewed: The medication list was reviewed and updated today  Medication List Reviewed: The medication list was reviewed and updated today  Allergies    1   No Known Drug Allergies    Vitals  Vital Signs    Recorded: 34Yaq4105 10:33AM   Temperature 98 F   Heart Rate 84   Respiration 14   Systolic 020   Diastolic 84   Height 4 ft 10 5 in   Weight 118 lb    BMI Calculated 24 24   BSA Calculated 1 46   O2 Saturation 99     Physical Exam    Constitutional   General appearance: No acute distress, well appearing and well nourished  Eyes   Conjunctiva and lids: No swelling, erythema or discharge  Pupils and irises: Equal, round and reactive to light  Ears, Nose, Mouth, and Throat   External inspection of ears and nose: Normal     Otoscopic examination: Tympanic membranes translucent with normal light reflex  Canals patent without erythema  Nasal mucosa, septum, and turbinates: Normal without edema or erythema  Oropharynx: Normal with no erythema, edema, exudate or lesions  Pulmonary   Respiratory effort: No increased work of breathing or signs of respiratory distress  Auscultation of lungs: Clear to auscultation  Cardiovascular   Palpation of heart: Normal PMI, no thrills  Auscultation of heart: Normal rate and rhythm, normal S1 and S2, without murmurs  Examination of extremities for edema and/or varicosities: Normal     Carotid pulses: Normal     Abdomen   Abdomen: Non-tender, no masses  Liver and spleen: No hepatomegaly or splenomegaly  Lymphatic   Palpation of lymph nodes in neck: No lymphadenopathy  Musculoskeletal   Gait and station: Normal     Digits and nails: Normal without clubbing or cyanosis  Inspection/palpation of joints, bones, and muscles: Normal     Skin   Skin and subcutaneous tissue: Normal without rashes or lesions  Neurologic   Cranial nerves: Cranial nerves 2-12 intact  Reflexes: 2+ and symmetric  Sensation: No sensory loss  Psychiatric   Orientation to person, place, and time: Normal     Mood and affect: Normal     Additional Exam:  Breast exam; status post left mastectomy without reconstruction   No palpable abnormality on her left chest wall  Right breast exam is negative  ECOG 0       Results/Data  Results Form:   Results   Pathology Left breast biopsy showed invasive ductal carcinoma, grade 2  % positive, MS 15-20% positive, HER-2 3+ disease  Final pathology specimen on October 25, 2016 is pending  6 3 cm area of extracellular mucin associated with therapy effect  17 negative axillary lymph node  Radiology Mammography on the right in May 2017 was benign  BI-RADS of 2  Lab Results Last one months of CBC and CMP were reviewed        Future Appointments    Date/Time Provider Specialty Site   12/15/2017 10:15 AM Armaan Dozier MD Surgical Oncology [facility] [place] [address]     Signatures   Electronically signed by : KEN Frederick ; Aug 17 2017 10:57AM EST                       (Author)

## 2018-01-22 VITALS
BODY MASS INDEX: 23.79 KG/M2 | SYSTOLIC BLOOD PRESSURE: 132 MMHG | DIASTOLIC BLOOD PRESSURE: 84 MMHG | OXYGEN SATURATION: 99 % | HEIGHT: 59 IN | RESPIRATION RATE: 14 BRPM | TEMPERATURE: 98 F | WEIGHT: 118 LBS | HEART RATE: 84 BPM

## 2018-01-23 VITALS
DIASTOLIC BLOOD PRESSURE: 80 MMHG | HEIGHT: 58 IN | HEART RATE: 88 BPM | RESPIRATION RATE: 14 BRPM | SYSTOLIC BLOOD PRESSURE: 138 MMHG | BODY MASS INDEX: 26.03 KG/M2 | WEIGHT: 124 LBS | OXYGEN SATURATION: 98 % | TEMPERATURE: 98.4 F

## 2018-01-24 VITALS
DIASTOLIC BLOOD PRESSURE: 90 MMHG | WEIGHT: 123 LBS | RESPIRATION RATE: 14 BRPM | SYSTOLIC BLOOD PRESSURE: 140 MMHG | HEART RATE: 125 BPM | BODY MASS INDEX: 25.82 KG/M2 | OXYGEN SATURATION: 95 % | HEIGHT: 58 IN | TEMPERATURE: 98.3 F

## 2018-01-31 ENCOUNTER — EVALUATION (OUTPATIENT)
Dept: PHYSICAL THERAPY | Facility: REHABILITATION | Age: 53
End: 2018-01-31
Payer: COMMERCIAL

## 2018-01-31 DIAGNOSIS — C50.812 MALIGNANT NEOPLASM OF OVERLAPPING SITES OF LEFT FEMALE BREAST (HCC): ICD-10-CM

## 2018-01-31 DIAGNOSIS — C50.912 MALIGNANT NEOPLASM OF LEFT FEMALE BREAST, UNSPECIFIED ESTROGEN RECEPTOR STATUS, UNSPECIFIED SITE OF BREAST (HCC): ICD-10-CM

## 2018-01-31 DIAGNOSIS — I89.0 SECONDARY LYMPHEDEMA: Primary | ICD-10-CM

## 2018-01-31 PROCEDURE — G8985 CARRY GOAL STATUS: HCPCS | Performed by: PHYSICAL THERAPIST

## 2018-01-31 PROCEDURE — G8984 CARRY CURRENT STATUS: HCPCS | Performed by: PHYSICAL THERAPIST

## 2018-01-31 PROCEDURE — 97161 PT EVAL LOW COMPLEX 20 MIN: CPT | Performed by: PHYSICAL THERAPIST

## 2018-01-31 NOTE — PROGRESS NOTES
PT Evaluation     Today's date: 2018  Patient name: Triston Park  : 1965  MRN: [id number]  Referring provider: Dee Dee Falcon MD  Dx:   Encounter Diagnoses   Name Primary?  Malignant neoplasm of overlapping sites of left female breast ([facility] )     Malignant neoplasm of left female breast, unspecified estrogen receptor status, unspecified site of breast ([facility] )     Secondary lymphedema Yes       Start Time: 1002  Stop Time: 1020  Total time in clinic (min): 18 minutes    Assessment  Impairments: abnormal or restricted ROM and pain with function  Other impairment: edema LUE   Functional limitations: inabiliity to lift arm fully overhead  Patient is not irritable  Assessment details: Decreased ROM left shoulder with increase in edema Stage I with no pitting noted  Would benefit from manual drainage with stretching and strengthening  Understanding of Dx/Px/POC: good   Prognosis: good    Goals  STG decrease pain 0-1/10  Increase ROM 25-50%  Increase strength 1/2 muscle grade  Decrease edema   25 cm  Return to independent ADL's  Full ROM   Full strength      Plan  Patient would benefit from: skilled PT  Referral necessary: NoPlanned therapy interventions: manual therapy, joint mobilization, therapeutic exercise and home exercise program  Other planned therapy interventions: manual lymphatic drainage  Frequency: 2x week  Duration in weeks: 6  Treatment plan discussed with: family and patient  Plan details: Continue manual drainage to LUE with stretching and strengthening of left shoulder as tolerated          Subjective Evaluation    Pain  Current pain ratin  At best pain ratin  At worst pain ratin  Quality: pulling  Relieving factors: rest          Objective     Swelling   Left shoulder swelling details:   14 cm above olecranon 30 2 cm  7 cm above 29 6 cm  Olecranon 27 3 cm  14 cm above ulnar styloid 25 2 cm  7 cm above 21 cm  Ulnar styloid 16 1 cm  mcp 19 cm  Right shoulder swelling details:   14 cm above olecranon 29 cm  7 cm above 27 cm  Olecranon 25 cm  14 cm above ulnar styloid 23 6 cm  7 cm above 18 8 cm  Ulnar styloid 15 6 cm  mcp 18 8      Precautions: anxiety, asthma, CA left breast, depression, HBP    Daily Treatment Diary     Manual  1/31            Manual drainage nv            mobilizations nv            PROM left shoulder nv                                          Exercise Diary              Wand flexion supine nv            Wand abduction nv                                                                                                                                                                                                                                                          Modalities                                                               Flowsheet Rows    Flowsheet Row Most Recent Value   PT/OT G-Codes   Current Score  48 9   Projected Score  65   FOTO information reviewed  Yes   Assessment Type  Evaluation   G code set  Carrying, Moving & Handling Objects   Carrying, Moving and Handling Objects Current Status ()  CK   Carrying, Moving and Handling Objects Goal Status ()  Efren Arellano

## 2018-02-01 ENCOUNTER — OFFICE VISIT (OUTPATIENT)
Dept: PHYSICAL THERAPY | Facility: REHABILITATION | Age: 53
End: 2018-02-01
Payer: COMMERCIAL

## 2018-02-01 DIAGNOSIS — I89.0 SECONDARY LYMPHEDEMA: Primary | ICD-10-CM

## 2018-02-01 PROCEDURE — 97140 MANUAL THERAPY 1/> REGIONS: CPT | Performed by: PHYSICAL THERAPIST

## 2018-02-01 PROCEDURE — 97110 THERAPEUTIC EXERCISES: CPT | Performed by: PHYSICAL THERAPIST

## 2018-02-01 NOTE — PROGRESS NOTES
Daily Note     Today's date: 2018  Patient name: Cole Goldstein  : 1965  MRN: [id number]  Referring provider: Dorota Luna MD  Dx:   Encounter Diagnosis   Name Primary?  Secondary lymphedema Yes       Start Time: 1144          Subjective: The arm feels tight  They did not give me a sleeve yesterday      Objective: See treatment diary below    Precautions: anxiety, asthma, CA left breast, depression, HBP     Daily Treatment Diary      Manual                     Manual drainage nv  10 min                   mobilizations nv  distraction and inferior glide Grade ! !                   PROM left shoulder nv  x 10 each                                                                         Exercise Diary                        Wand flexion supine nv  20                   Wand abduction nv  20                                                                                                                                                                                                                                                                                                                                                                                                                                                                         Modalities                                                                                                  Assessment: Tolerated treatment well  Patient would benefit from continued PT      Plan: Continue per plan of care

## 2018-02-06 ENCOUNTER — OFFICE VISIT (OUTPATIENT)
Dept: PHYSICAL THERAPY | Facility: REHABILITATION | Age: 53
End: 2018-02-06
Payer: COMMERCIAL

## 2018-02-06 DIAGNOSIS — I89.0 SECONDARY LYMPHEDEMA: Primary | ICD-10-CM

## 2018-02-06 PROCEDURE — 97140 MANUAL THERAPY 1/> REGIONS: CPT | Performed by: PHYSICAL THERAPIST

## 2018-02-06 PROCEDURE — 97110 THERAPEUTIC EXERCISES: CPT | Performed by: PHYSICAL THERAPIST

## 2018-02-06 NOTE — PROGRESS NOTES
Daily Note     Today's date: 2018  Patient name: Marco A Mercado  : 1965  MRN: [id number]  Referring provider: Lyla Nelson MD  Dx:   Encounter Diagnosis   Name Primary?  Secondary lymphedema Yes                  Subjective: It's moving better      Objective: See treatment diary below  Precautions: anxiety, asthma, CA left breast, depression, HBP     Daily Treatment Diary      Manual                   Manual drainage nv  10 min  10 min                 mobilizations nv  distraction and inferior glide Grade !!  distraction and inferior glide                 PROM left shoulder nv  x 10 each  15 each                                                                       Exercise Diary                        Wand flexion supine nv  20  1# x 20                 Wand abduction nv  20  20                  wand extension     20                  wand internal rotation     20                                                                                                                                                                                                                                                                                                                                                                                                                       Modalities                                                                                                      Assessment: Tolerated treatment well  Patient exhibited good technique with therapeutic exercises      Plan: Continue per plan of care

## 2018-02-08 ENCOUNTER — OFFICE VISIT (OUTPATIENT)
Dept: PHYSICAL THERAPY | Facility: REHABILITATION | Age: 53
End: 2018-02-08
Payer: COMMERCIAL

## 2018-02-08 DIAGNOSIS — I89.0 SECONDARY LYMPHEDEMA: Primary | ICD-10-CM

## 2018-02-08 PROCEDURE — 97140 MANUAL THERAPY 1/> REGIONS: CPT | Performed by: PHYSICAL THERAPIST

## 2018-02-08 PROCEDURE — 97110 THERAPEUTIC EXERCISES: CPT | Performed by: PHYSICAL THERAPIST

## 2018-02-08 NOTE — PROGRESS NOTES
Daily Note     Today's date: 2018  Patient name: Black Terrazas  : 1965  MRN: [id number]  Referring provider: Radha Hart MD  Dx:   Encounter Diagnosis   Name Primary?  Secondary lymphedema Yes       Start Time: 7440          Subjective: pulling with flexion and weakness with exercise behind the back but no pain  Objective: See treatment diary below  Manual                 Manual drainage nv  10 min  10 min  x 10min               mobilizations nv  distraction and inferior glide Grade !!  distraction and inferior glide  distraction and inferior glide               PROM left shoulder nv  x 10 each  15 each  15 flexion recheck others                                                                     Exercise Diary                        Wand flexion supine nv  20  1# x 20   2 # x  20               Wand abduction nv  20  20  1# x 20                wand extension     20  1# x 20                wand internal rotation     20  1# x 20                wall climbs flexion       x5                                                                                                                                                                                                                                                                                                                                                                                             Modalities                                                                                                   Left shoulder swelling details:   14 cm above olecranon 31 2 cm  7 cm above 30 3 cm  Olecranon 27 1 cm  14 cm above ulnar styloid 25 7 cm  7 cm above 21 3 cm  Ulnar styloid 16 cm  mcp 19 cm  Right shoulder swelling details:   14 cm above olecranon 29 cm  7 cm above 27 cm  Olecranon 25 cm  14 cm above ulnar styloid 23 6 cm  7 cm above 18 8 cm  Ulnar styloid 15 6 cm  mcp 18 8    Assessment: Tolerated treatment well  Patient would benefit from continued PT  Movement of fluid throughout LUE      Plan: Continue per plan of care

## 2018-02-12 ENCOUNTER — OFFICE VISIT (OUTPATIENT)
Dept: PHYSICAL THERAPY | Facility: REHABILITATION | Age: 53
End: 2018-02-12
Payer: COMMERCIAL

## 2018-02-12 DIAGNOSIS — I89.0 SECONDARY LYMPHEDEMA: Primary | ICD-10-CM

## 2018-02-12 PROCEDURE — 97140 MANUAL THERAPY 1/> REGIONS: CPT | Performed by: PHYSICAL THERAPIST

## 2018-02-12 NOTE — PROGRESS NOTES
Daily Note     Today's date: 2018  Patient name: Johnnie Mcqueen  : 1965  MRN: [id number]  Referring provider: Ramila Amato MD  Dx:   Encounter Diagnosis   Name Primary?  Secondary lymphedema Yes                  Subjective: It just pulls on my side but I'm moving better      Objective: See treatment diary below  Manual               Manual drainage nv  10 min  10 min  x 10min  10 min             mobilizations nv  distraction and inferior glide Grade !!  distraction and inferior glide  distraction and inferior glide  as last visit             PROM left shoulder nv  x 10 each  15 each  15 flexion recheck others  as ast visit                                                                   Exercise Diary                        Wand flexion supine nv  20  1# x 20   2 # x  20  3# x 20             Wand abduction nv  20  20  1# x 20  DC to home              wand extension     20  1# x 20  2# x 20              wand internal rotation     20  1# x 20  2# x 20              wall climbs flexion       x5  dc to home              shoulder flexion          x20              shoulder abduction          x 20                                                                                                                                                                                                                                                                                                                                           Modalities                                                                                                       Assessment: Tolerated treatment well  Patient did well with progression of exercise  Plan: Continue per plan of care

## 2018-02-14 ENCOUNTER — OFFICE VISIT (OUTPATIENT)
Dept: PHYSICAL THERAPY | Facility: REHABILITATION | Age: 53
End: 2018-02-14
Payer: COMMERCIAL

## 2018-02-14 DIAGNOSIS — I89.0 SECONDARY LYMPHEDEMA: Primary | ICD-10-CM

## 2018-02-14 PROCEDURE — 97110 THERAPEUTIC EXERCISES: CPT | Performed by: PHYSICAL THERAPIST

## 2018-02-14 PROCEDURE — 97140 MANUAL THERAPY 1/> REGIONS: CPT | Performed by: PHYSICAL THERAPIST

## 2018-02-14 NOTE — PROGRESS NOTES
Daily Note     Today's date: 2018  Patient name: Christina Moreno  : 1965  MRN: [id number]  Referring provider: Danyelle Garland MD  Dx:   Encounter Diagnosis   Name Primary?     Secondary lymphedema Yes       Start Time: 0000  Stop Time: 1756  Total time in clinic (min): 23 minutes    Subjective: It's getting looser      Objective: See treatment diary below  Manual             Manual drainage nv  10 min  10 min  x 10min  10 min  x 10 min           mobilizations nv  distraction and inferior glide Grade !!  distraction and inferior glide  distraction and inferior glide  as last visit  as last visit           PROM left shoulder nv  x 10 each  15 each  15 flexion recheck others  as ast visit  as last visit                                                                 Exercise Diary                        Wand flexion supine nv  20  1# x 20   2 # x  20  3# x 20  4# x 20           Wand abduction nv  20  20  1# x 20  DC to home              wand extension     20  1# x 20  2# x 20  3# x 20            wand internal rotation     20  1# x 20  2# x 20  3# x20            wall climbs flexion       x5  dc to home              shoulder flexion          x20  1# x 20            shoulder abduction          x 20  1# x20                                                                                                                                                                                                                                                                                                                                         Modalities                                                                                                  Left shoulder swelling details:   14 cm above olecranon 31 5 cm  7 cm above 29 2 cm  Olecranon 26 6 cm  14 cm above ulnar styloid 26 2 cm  7 cm above 21 cm  Ulnar styloid 16 cm  mcp 19 cm  Right shoulder swelling details:   14 cm above olecranon 29 cm  7 cm above 27 cm  Olecranon 25 cm  14 cm above ulnar styloid 23 6 cm  7 cm above 19 5 cm  Ulnar styloid 15 6 cm  mcp 18 8    Assessment: Tolerated treatment well  Patient would benefit from continued PT  Decrease in edema noted      Plan: Continue per plan of care

## 2018-02-19 ENCOUNTER — OFFICE VISIT (OUTPATIENT)
Dept: HEMATOLOGY ONCOLOGY | Facility: CLINIC | Age: 53
End: 2018-02-19
Payer: COMMERCIAL

## 2018-02-19 VITALS
SYSTOLIC BLOOD PRESSURE: 122 MMHG | BODY MASS INDEX: 25.61 KG/M2 | TEMPERATURE: 98.2 F | OXYGEN SATURATION: 99 % | HEART RATE: 88 BPM | WEIGHT: 122 LBS | RESPIRATION RATE: 16 BRPM | DIASTOLIC BLOOD PRESSURE: 68 MMHG | HEIGHT: 58 IN

## 2018-02-19 DIAGNOSIS — C50.912: Primary | ICD-10-CM

## 2018-02-19 PROCEDURE — 99214 OFFICE O/P EST MOD 30 MIN: CPT | Performed by: INTERNAL MEDICINE

## 2018-02-19 NOTE — PROGRESS NOTES
Hematology / Oncology Outpatient Follow Up Note    Twila Alvarenga 47 y o  female :1965 KQZ:[id number]         Date:  2018    Assessment / Plan:  A 47year old premenopausal woman with locally advanced left breast cancer, grade 2, ER strongly positive, IN weakly positive, HER-2 3+ disease  She has quite large left breast mass, with no fixation to the chest wall  There is some skin change or the left breast mass, but no skin ulceration  She has no evidence of distant metastasis  She underwent neoadjuvant chemotherapy with dose dense AC , followed by weekly paclitaxel, trastuzumab and pertuzumab resulting in pathologically very good partial response  She had 0 5 mm of residual invasive ductal carcinoma with 18 negative lymph nodes  She completed trastuzumab monotherapy as adjuvant setting  She is currently on adjuvant hormonal therapy with tamoxifen with expected side effect with hot flashes  Clinically, she has no evidence recurrent disease  I recommended her to continue with tamoxifen 20 milligram daily  Her anemia is likely to be iron deficiency  I recommended her to continue with oral iron  Regarding her exertional shortness of breath, etiology is not clear  She did not have any cardiac toxicity with previous chemotherapy or trastuzumab  I recommended her to have CT scan with PE protocol to rule out pulmonary embolism, since she is on tamoxifen  She and her daughter are in agreement with my recommendation  I will see her again in 6 months with CBC, CMP and ferritin  Subjective:     HPI:  A 49year old premenopausal woman who has been aware of left breast mass for 2-3 years, which has been growing  She underwent left breast biopsy in 2015 in [facility], which showed invasive ductal carcinoma, ER positive, HER-2 3+ disease  She was seen by medical oncologist who put her on tamoxifen   She was on tamoxifen from 2015 through 2016 with some shrinkage of left breast mass  After she started tamoxifen, she stopped having menstrual cycle  Prior to this, she had regular menstrual cycle until November 2015  She recently came to [place], [place] to be with her daughter  She presented to Dr Momo Giang office for surgical consultation, where she was found to have quite large left breast mass with some skin change  Dr Momo Giang did metastatic workup  CT scan of chest, abdomen pelvis showed large left breast mass without any evidence of distant metastasis  Bone scan was negative  She also underwent mammography scan which showed ejection fraction 63%  She presents today to discuss neoadjuvant chemotherapy  She has no complaint of pain  However, she feels anxious  Her weight has been stable  She has no respiratory symptom  She has no other significant comorbidities except hypertension  Her performance status is normal          Interval History:  A 29-year-old premenopausal woman with locally advanced left breast cancer, grade 2, ER strongly positive, SD weakly positive, HER-2 3+ disease  She had quite large left breast mass as well as skin involvement  She was previously treated with tamoxifen by oncologist in [facility] before she moved to the [facility] area to be with her daughter  She has no evidence of metastatic disease  She completed neoadjuvant chemotherapy with AC , followed by paclitaxel, trastuzumab and pertuzumab with minimal toxicity  She achieved clinical partial response  She underwent left mastectomy with axillary lymph node dissection in October 25, 2016  Pathology showed 0 5 mm of residual invasive carcinoma with 18 negative axillary lymph node  This is consistent with very good pathological response  She completed trastuzumab monotherapy in June 2017 without cardiac toxicity  She is currently on adjuvant hormonal therapy with tamoxifen  She presented today for routine follow-up  She has anemia for which she is on oral iron    Her hemoglobin has been slowly improving  However, she has exertional shortness of breath  She denied any cough or sputum production  Her weight is stable  She continued to have moderate hot flashes  She denied any pain  Her performance status is normal       Objective:     Primary Diagnosis:    Locally advanced left breast cancer, grade 2, ER positive, MD weakly positive, HER-2 3+ disease  Diagnosed in July 2015  Cancer Staging:  No matching staging information was found for the patient  Previous Hematologic/ Oncologic Treatment:     1  Tamoxifen from December 2015 through March 2016 by previous oncologist in [facility]   2  Neoadjuvant chemotherapy with dose dense AC Ã--4 followed by 12 weekly paclitaxel, trastuzumab, and triweekly pertuzumab  Completed in early September 2016  3  Mastectomy with lymph node dissection in October 25, 2016   4  Adjuvant trastuzumab monotherapy until June 2017  Current Hematologic/ Oncologic Treatment:      1  Adjuvant hormonal therapy with tamoxifen since February 2017  Disease Status:     Clinical partial response  Very good pathological response with minimal invasive residual carcinoma  ANGELA status post mastectomy with lymph node dissection  Test Results:    Pathology:    Left breast biopsy showed invasive ductal carcinoma, grade 2  % positive, MD 15-20% positive, HER-2 3+ disease      Final pathology specimen on October 25, 2016 showed 0 5 cm of residual invasive ductal carcinoma with -18 axillary lymph nodes  Radiology:    Mammography on the right in May 2017 was benign  BI-RADS of 2  Laboratory:    See below    Physical Exam:      General Appearance:    Alert, oriented        Eyes:    PERRL   Ears:    Normal external ear canals, both ears   Nose:   Nares normal, septum midline   Throat:   Mucosa moist  Pharynx without injection      Neck:   Supple       Lungs:     Clear to auscultation bilaterally   Chest Wall:    No tenderness or deformity    Heart:    Regular rate and rhythm       Abdomen:     Soft, non-tender, bowel sounds +, no organomegaly           Extremities:   Extremities no cyanosis or edema       Skin:   no rash or icterus  Lymph nodes:   Cervical, supraclavicular, and axillary nodes normal   Neurologic:   CNII-XII intact, normal strength, sensation and reflexes     Throughout          Breast exam:   status post left mastectomy without reconstruction  No palpable abnormality on her left chest wall  Right breast exam is negative  ROS: Review of Systems   Constitutional:        Hot flashes   Respiratory:        Exertional shortness of breath   All other systems reviewed and are negative  Imaging: No results found  Labs:   Lab Results   Component Value Date    WBC 5 20 01/17/2017    HGB 11 0 (L) 01/17/2017    HCT 33 6 (L) 01/17/2017    MCV 81 (L) 01/17/2017     (H) 01/17/2017     Lab Results   Component Value Date     01/18/2017    K 3 5 01/18/2017     01/18/2017    CO2 28 01/18/2017    ANIONGAP 8 01/18/2017    BUN 14 01/18/2017    CREATININE 0 84 01/18/2017    GLUCOSE 99 01/18/2017    CALCIUM 9 3 01/18/2017    AST 27 08/24/2016    ALT 43 08/24/2016    ALKPHOS 69 08/24/2016    PROT 7 3 08/24/2016    BILITOT 0 67 08/24/2016    EGFR >60 0 01/18/2017       Current Medications: Reviewed  Allergies: Reviewed  PMH/FH/SH:  Reviewed      Vital Sign:    Body surface area is 1 48 meters squared      Wt Readings from Last 3 Encounters:   02/19/18 55 3 kg (122 lb)   12/22/17 55 8 kg (123 lb)   12/15/17 56 2 kg (124 lb)        Temp Readings from Last 3 Encounters:   02/19/18 98 2 °F (36 8 °C) (Tympanic)   12/22/17 98 3 °F (36 8 °C)   12/15/17 98 4 °F (36 9 °C)        BP Readings from Last 3 Encounters:   02/19/18 122/68   12/22/17 140/90   12/15/17 138/80         Pulse Readings from Last 3 Encounters:   02/19/18 88   12/22/17 (!) 125   12/15/17 88     @LASTSAO2(3)@

## 2018-02-22 ENCOUNTER — APPOINTMENT (OUTPATIENT)
Dept: LAB | Facility: HOSPITAL | Age: 53
End: 2018-02-22
Attending: INTERNAL MEDICINE
Payer: COMMERCIAL

## 2018-02-22 DIAGNOSIS — C50.912: ICD-10-CM

## 2018-02-22 LAB
ALBUMIN SERPL BCP-MCNC: 3.7 G/DL (ref 3.5–5)
ALP SERPL-CCNC: 46 U/L (ref 46–116)
ALT SERPL W P-5'-P-CCNC: 18 U/L (ref 12–78)
ANION GAP SERPL CALCULATED.3IONS-SCNC: 9 MMOL/L (ref 4–13)
AST SERPL W P-5'-P-CCNC: 21 U/L (ref 5–45)
BASOPHILS # BLD AUTO: 0.02 THOUSANDS/ΜL (ref 0–0.1)
BASOPHILS NFR BLD AUTO: 0 % (ref 0–1)
BILIRUB SERPL-MCNC: 0.52 MG/DL (ref 0.2–1)
BUN SERPL-MCNC: 16 MG/DL (ref 5–25)
CALCIUM SERPL-MCNC: 9.1 MG/DL (ref 8.3–10.1)
CHLORIDE SERPL-SCNC: 102 MMOL/L (ref 100–108)
CO2 SERPL-SCNC: 29 MMOL/L (ref 21–32)
CREAT SERPL-MCNC: 0.86 MG/DL (ref 0.6–1.3)
EOSINOPHIL # BLD AUTO: 0.26 THOUSAND/ΜL (ref 0–0.61)
EOSINOPHIL NFR BLD AUTO: 4 % (ref 0–6)
ERYTHROCYTE [DISTWIDTH] IN BLOOD BY AUTOMATED COUNT: 13.3 % (ref 11.6–15.1)
FERRITIN SERPL-MCNC: 42 NG/ML (ref 8–388)
GFR SERPL CREATININE-BSD FRML MDRD: 78 ML/MIN/1.73SQ M
GLUCOSE P FAST SERPL-MCNC: 98 MG/DL (ref 65–99)
HCT VFR BLD AUTO: 34.1 % (ref 34.8–46.1)
HGB BLD-MCNC: 11.6 G/DL (ref 11.5–15.4)
LYMPHOCYTES # BLD AUTO: 1.76 THOUSANDS/ΜL (ref 0.6–4.47)
LYMPHOCYTES NFR BLD AUTO: 27 % (ref 14–44)
MCH RBC QN AUTO: 28.9 PG (ref 26.8–34.3)
MCHC RBC AUTO-ENTMCNC: 34 G/DL (ref 31.4–37.4)
MCV RBC AUTO: 85 FL (ref 82–98)
MONOCYTES # BLD AUTO: 0.52 THOUSAND/ΜL (ref 0.17–1.22)
MONOCYTES NFR BLD AUTO: 8 % (ref 4–12)
NEUTROPHILS # BLD AUTO: 4.02 THOUSANDS/ΜL (ref 1.85–7.62)
NEUTS SEG NFR BLD AUTO: 61 % (ref 43–75)
PLATELET # BLD AUTO: 214 THOUSANDS/UL (ref 149–390)
PMV BLD AUTO: 11 FL (ref 8.9–12.7)
POTASSIUM SERPL-SCNC: 3.1 MMOL/L (ref 3.5–5.3)
PROT SERPL-MCNC: 7.8 G/DL (ref 6.4–8.2)
RBC # BLD AUTO: 4.02 MILLION/UL (ref 3.81–5.12)
SODIUM SERPL-SCNC: 140 MMOL/L (ref 136–145)
WBC # BLD AUTO: 6.58 THOUSAND/UL (ref 4.31–10.16)

## 2018-02-22 PROCEDURE — 82728 ASSAY OF FERRITIN: CPT

## 2018-02-22 PROCEDURE — 85025 COMPLETE CBC W/AUTO DIFF WBC: CPT

## 2018-02-22 PROCEDURE — 80053 COMPREHEN METABOLIC PANEL: CPT

## 2018-02-22 PROCEDURE — 36415 COLL VENOUS BLD VENIPUNCTURE: CPT

## 2018-02-23 ENCOUNTER — HOSPITAL ENCOUNTER (OUTPATIENT)
Dept: CT IMAGING | Facility: HOSPITAL | Age: 53
Discharge: HOME | End: 2018-02-23
Attending: INTERNAL MEDICINE

## 2018-02-27 ENCOUNTER — EVALUATION (OUTPATIENT)
Dept: PHYSICAL THERAPY | Facility: REHABILITATION | Age: 53
End: 2018-02-27
Payer: COMMERCIAL

## 2018-02-27 DIAGNOSIS — I89.0 SECONDARY LYMPHEDEMA: Primary | ICD-10-CM

## 2018-02-27 PROCEDURE — 97140 MANUAL THERAPY 1/> REGIONS: CPT | Performed by: PHYSICAL THERAPIST

## 2018-02-27 NOTE — PROGRESS NOTES
Daily Note     Today's date: 2018  Patient name: Johnnie Mcqueen  : 1965  MRN: [id number]  Referring provider: Ramila Amato MD  Dx:   Encounter Diagnosis     ICD-10-CM    1   Secondary lymphedema I89 0                   Subjective: the sleeve they gave me made my arm blow up    Objective: See treatment diary below  Manual           Manual drainage nv  10 min  10 min  x 10min  10 min  x 10 min  13 min         mobilizations nv  distraction and inferior glide Grade !!  distraction and inferior glide  distraction and inferior glide  as last visit  as last visit  as last visit         PROM left shoulder nv  x 10 each  15 each  15 flexion recheck others  as ast visit  as last visit  as last visit                                                               Exercise Diary                        Wand flexion supine nv  20  1# x 20   2 # x  20  3# x 20  4# x 20  5# x 15         Wand abduction nv  20  20  1# x 20  DC to home              wand extension     20  1# x 20  2# x 20  3# x 20  4# x 20          wand internal rotation     20  1# x 20  2# x 20  3# x20  4# x 20          wall climbs flexion       x5  dc to home              shoulder flexion          x20  1# x 20  2# x 20          shoulder abduction          x 20  1# x20  2# x 15                                                                                                                                                                                                                                                                                                                                       Modalities                                                                                                   Left shoulder swelling details:   14 cm above olecranon 31 5 cm  7 cm above 29 2 cm  Olecranon 26 6 cm  14 cm above ulnar styloid 26 2 cm  7 cm above 21 cm  Ulnar styloid 16 cm  mcp 19 cm  Right shoulder swelling details:   14 cm above olecranon 29 cm  7 cm above 27 cm  Olecranon 25 cm  14 cm above ulnar styloid 23 6 cm  7 cm above 19 5 cm  Ulnar styloid 15 6 cm  mcp 18 8        Assessment: Tolerated treatment well  Patient demonstrated fatigue post treatment      Plan: Continue per plan of care

## 2018-03-01 ENCOUNTER — EVALUATION (OUTPATIENT)
Dept: PHYSICAL THERAPY | Facility: REHABILITATION | Age: 53
End: 2018-03-01
Payer: COMMERCIAL

## 2018-03-01 DIAGNOSIS — I89.0 SECONDARY LYMPHEDEMA: Primary | ICD-10-CM

## 2018-03-01 PROCEDURE — G8984 CARRY CURRENT STATUS: HCPCS | Performed by: PHYSICAL THERAPIST

## 2018-03-01 PROCEDURE — 97140 MANUAL THERAPY 1/> REGIONS: CPT | Performed by: PHYSICAL THERAPIST

## 2018-03-01 PROCEDURE — 97110 THERAPEUTIC EXERCISES: CPT | Performed by: PHYSICAL THERAPIST

## 2018-03-01 PROCEDURE — G8985 CARRY GOAL STATUS: HCPCS | Performed by: PHYSICAL THERAPIST

## 2018-03-01 NOTE — PROGRESS NOTES
PT Evaluation     Today's date: 3/1/2018  Patient name: Philly Edgar  : 1965  MRN: [id number]  Referring provider: Armaan Dozier MD  Dx:   No diagnosis found  Assessment  Impairments: abnormal or restricted ROM and pain with function  Other impairment: edema LUE   Functional limitations: inabiliity to lift arm fully overhead  Patient is not irritable  Assessment details: Increased ROM left shoulder with decrease in edema Stage I with no pitting noted  Would benefit from manual drainage with stretching and strengthening  Understanding of Dx/Px/POC: good   Prognosis: good    Goals  STG decrease pain 0-1/10  Increase ROM 25-50%  Increase strength 1/2 muscle grade  Decrease edema   25 cm  Return to independent ADL's  Full ROM   Full strength      Plan  Patient would benefit from: skilled PT  Referral necessary: No  Planned therapy interventions: manual therapy, joint mobilization, therapeutic exercise and home exercise program  Other planned therapy interventions: manual lymphatic drainage  Frequency: 2x week  Duration in weeks: 4  Treatment plan discussed with: family and patient  Plan details: Continue manual drainage to LUE with stretching and strengthening of left shoulder as tolerated          Subjective Evaluation    Pain  Current pain ratin  At best pain ratin  At worst pain ratin          Objective     Active Range of Motion   Left Shoulder   Flexion: 132 degrees   Abduction: 148 degrees     Passive Range of Motion   Left Shoulder   Flexion: 167 degrees   Abduction: 180 degrees   External rotation 90°: 90 degrees   Internal rotation 90°: 75 degrees     Strength/Myotome Testing     Left Shoulder     Planes of Motion   Flexion: 3+   Abduction: 3+     Swelling   Left shoulder swelling details:   14 cm above olecranon 31 cm  7 cm above 30 cm  Olecranon 26 8 cm  14 cm above ulnar styloid 25 5 cm  7 cm above 20 7 cm  Ulnar styloid 16 1 cm  mcp 19 cm  Right shoulder swelling details:   14 cm above olecranon 29 cm  7 cm above 27 cm  Olecranon 25 cm  14 cm above ulnar styloid 23 6 cm  7 cm above 18 8 cm  Ulnar styloid 15 6 cm  mcp 18 8      Precautions: anxiety, asthma, CA left breast, depression, HBP    Daily Treatment Diary     Manual  1/31  2/1  2/6  2/8  2/12  2/14  2/27  3/1       Manual drainage nv  10 min  10 min  x 10min  10 min  x 10 min  13 min         mobilizations nv  distraction and inferior glide Grade !!  distraction and inferior glide  distraction and inferior glide  as last visit  as last visit  as last visit         PROM left shoulder nv  x 10 each  15 each  15 flexion recheck others  as ast visit  as last visit  as last visit                                                               Exercise Diary                        Wand flexion supine nv  20  1# x 20   2 # x  20  3# x 20  4# x 20  5# x 15  5# x 20       Wand abduction nv  20  20  1# x 20  DC to home              wand extension     20  1# x 20  2# x 20  3# x 20  4# x 20  5# x 15        wand internal rotation     20  1# x 20  2# x 20  3# x20  4# x 20  5# x 15        wall climbs flexion       x5  dc to home              shoulder flexion          x20  1# x 20  2# x 20  3# x 15        shoulder abduction          x 20  1# x20  2# x 15  2# x 15                                                                                                                                                                                                                                                                                                                                     Modalities                                                                                                         Flowsheet Rows    Flowsheet Row Most Recent Value   PT/OT G-Codes   Current Score  64 7   Projected Score  70   FOTO information reviewed  Yes   Assessment Type  Re-evaluation   G code set  Carrying, Moving & Handling Objects   Carrying, Moving and Handling Objects Current Status ()  CJ   Carrying, Moving and Handling Objects Goal Status ()  CI

## 2018-03-06 ENCOUNTER — OFFICE VISIT (OUTPATIENT)
Dept: PHYSICAL THERAPY | Facility: REHABILITATION | Age: 53
End: 2018-03-06
Payer: COMMERCIAL

## 2018-03-06 DIAGNOSIS — I89.0 SECONDARY LYMPHEDEMA: Primary | ICD-10-CM

## 2018-03-06 PROCEDURE — 97140 MANUAL THERAPY 1/> REGIONS: CPT | Performed by: PHYSICAL THERAPIST

## 2018-03-06 PROCEDURE — 97110 THERAPEUTIC EXERCISES: CPT | Performed by: PHYSICAL THERAPIST

## 2018-03-06 NOTE — PROGRESS NOTES
Daily Note     Today's date: 3/6/2018  Patient name: Ramírez Mejia  : 1965  MRN: [id number]  Referring provider: Nacho Ward MD  Dx:   Encounter Diagnosis     ICD-10-CM    1   Secondary lymphedema I89 0                   Subjective: no new complaints      Objective: See treatment diary below  Precautions: anxiety, asthma, CA left breast, depression, HBP     Daily Treatment Diary      Manual  1/31  2/1  2/6  2/8  2/12  2/14  2/27  3/6       Manual drainage nv  10 min  10 min  x 10min  10 min  x 10 min  13 min  12 min       mobilizations nv  distraction and inferior glide Grade !!  distraction and inferior glide  distraction and inferior glide  as last visit  as last visit  as last visit  as before       PROM left shoulder nv  x 10 each  15 each  15 flexion recheck others  as ast visit  as last visit  as last visit  flexoin only                                                             Exercise Diary                        Wand flexion supine nv  20  1# x 20   2 # x  20  3# x 20  4# x 20  5# x 15  5# x 20  5# x 20     Wand abduction nv  20  20  1# x 20  DC to home              wand extension     20  1# x 20  2# x 20  3# x 20  4# x 20  5# x 15 5# x 15      wand internal rotation     20  1# x 20  2# x 20  3# x20  4# x 20  5# x 15  5# x 15      wall climbs flexion       x5  dc to home              shoulder flexion          x20  1# x 20  2# x 20  3# x 15  3# x 20      shoulder abduction          x 20  1# x20  2# x 15  2# x 15  2# x15      door way stretch                  x 3 with 10 sec hold                                                                                                                                                                                                                                                                                                           Modalities                                                                                                             Assessment: Tolerated treatment well  Patient demonstrated fatigue post treatment      Plan: Continue per plan of care

## 2018-03-09 ENCOUNTER — APPOINTMENT (OUTPATIENT)
Dept: PHYSICAL THERAPY | Facility: REHABILITATION | Age: 53
End: 2018-03-09
Payer: COMMERCIAL

## 2018-03-13 ENCOUNTER — OFFICE VISIT (OUTPATIENT)
Dept: PHYSICAL THERAPY | Facility: REHABILITATION | Age: 53
End: 2018-03-13
Payer: COMMERCIAL

## 2018-03-13 DIAGNOSIS — I89.0 SECONDARY LYMPHEDEMA: Primary | ICD-10-CM

## 2018-03-13 PROCEDURE — 97140 MANUAL THERAPY 1/> REGIONS: CPT | Performed by: PHYSICAL THERAPIST

## 2018-03-13 PROCEDURE — 97110 THERAPEUTIC EXERCISES: CPT | Performed by: PHYSICAL THERAPIST

## 2018-03-13 NOTE — PROGRESS NOTES
Daily Note     Today's date: 3/13/2018  Patient name: Oib Odom  : 1965  MRN: [id number]  Referring provider: Hermilo Angela MD  Dx:   Encounter Diagnosis     ICD-10-CM    1   Secondary lymphedema I89 0                   Subjective: no problems over the weekend      Objective: See treatment diary below  Precautions: anxiety, asthma, CA left breast, depression, HBP     Daily Treatment Diary      Manual  1/31  2/1  2/6  2/8  2/12  2/14  2/27  3/6  3/13     Manual drainage nv  10 min  10 min  x 10min  10 min  x 10 min  13 min  12 min  12 min     mobilizations nv  distraction and inferior glide Grade !!  distraction and inferior glide  distraction and inferior glide  as last visit  as last visit  as last visit  as before As  before     PROM left shoulder nv  x 10 each  15 each  15 flexion recheck others  as ast visit  as last visit  as last visit  flexoin only  flexion only                                                           Exercise Diary                        Wand flexion supine nv  20  1# x 20   2 # x  20  3# x 20  4# x 20  5# x 15  5# x 20  5# x 20  5# x 20   Wand abduction nv  20  20  1# x 20  DC to home              wand extension     20  1# x 20  2# x 20  3# x 20  4# x 20  5# x 15 5# x 15  5# x 20    wand internal rotation     20  1# x 20  2# x 20  3# x20  4# x 20  5# x 15  5# x 15  5# x 15    wall climbs flexion       x5  dc to home              shoulder flexion          x20  1# x 20  2# x 20  3# x 15  3# x 20  4# x     shoulder abduction          x 20  1# x20  2# x 15  2# x 15  2# x15  2# x 20    door way stretch                  x 3 with 10 sec hold  x5 with 10 sec hold                                                                                                                                                                                                                                                                                                         Modalities                              Assessment: Tolerated treatment well  Patient demonstrated fatigue post treatment      Plan: Continue per plan of care

## 2018-03-16 ENCOUNTER — OFFICE VISIT (OUTPATIENT)
Dept: PHYSICAL THERAPY | Facility: REHABILITATION | Age: 53
End: 2018-03-16
Payer: COMMERCIAL

## 2018-03-16 DIAGNOSIS — I89.0 SECONDARY LYMPHEDEMA: Primary | ICD-10-CM

## 2018-03-16 PROCEDURE — 97110 THERAPEUTIC EXERCISES: CPT | Performed by: PHYSICAL THERAPIST

## 2018-03-16 PROCEDURE — 97140 MANUAL THERAPY 1/> REGIONS: CPT | Performed by: PHYSICAL THERAPIST

## 2018-03-16 NOTE — PROGRESS NOTES
Daily Note     Today's date: 3/16/2018  Patient name: Purvi Mahajan  : 1965  MRN: [id number]  Referring provider: Erika Barber MD  Dx:   Encounter Diagnosis     ICD-10-CM    1  Secondary lymphedema I89 0                   Subjective:  The sleeve did not come in yet      Objective: See treatment diary below  Precautions: anxiety, asthma, CA left breast, depression, HBP     Daily Treatment Diary      Manual  3/16  2/1  2/6  2/8  2/12  2/14  2/27  3/6  3/13     Manual drainage 12 min  10 min  10 min  x 10min  10 min  x 10 min  13 min  12 min  12 min     mobilizations distraction and inferior glide  distraction and inferior glide Grade !!  distraction and inferior glide  distraction and inferior glide  as last visit  as last visit  as last visit  as before As  before     PROM left shoulder flexion only  x 10 each  15 each  15 flexion recheck others  as ast visit  as last visit  as last visit  flexoin only  flexion only                                                           Exercise Diary                        Wand flexion supine 5 # x 20  20  1# x 20   2 # x  20  3# x 20  4# x 20  5# x 15  5# x 20  5# x 20  5# x 20   Wand abduction   20  20  1# x 20  DC to home              wand extension  5# x 20   20  1# x 20  2# x 20  3# x 20  4# x 20  5# x 15 5# x 15  5# x 20    wand internal rotation  5# x 15   20  1# x 20  2# x 20  3# x20  4# x 20  5# x 15  5# x 15  5# x 15    wall climbs flexion       x5  dc to home              shoulder flexion  4# x 10        x20  1# x 20  2# x 20  3# x 15  3# x 20  4# x 10    shoulder abduction  3# x 13        x 20  1# x20  2# x 15  2# x 15  2# x15  2# x 20    door way stretch x5 with 10 sec hold                x 3 with 10 sec hold  x5 with 10 sec hold                                                                                                                                                                                                                                                                                                     Swelling   Left shoulder swelling details:   14 cm above olecranon 31 cm  7 cm above 29 8 cm  Olecranon 26 5 cm  14 cm above ulnar styloid 25 9 cm  7 cm above 20 5 cm  Ulnar styloid 16 1 cm  mcp 19 cm  Right shoulder swelling details:   14 cm above olecranon 29 cm  7 cm above 27 cm  Olecranon 25 cm  14 cm above ulnar styloid 23 6 cm  7 cm above 18 8 cm  Ulnar styloid 15 6 cm  mcp 18 8         Assessment: Tolerated treatment well  Patient demonstrated fatigue post treatment  Further decrease in edema      Plan: Continue per plan of care

## 2018-03-19 ENCOUNTER — OFFICE VISIT (OUTPATIENT)
Dept: PHYSICAL THERAPY | Facility: REHABILITATION | Age: 53
End: 2018-03-19
Payer: COMMERCIAL

## 2018-03-19 DIAGNOSIS — I89.0 SECONDARY LYMPHEDEMA: Primary | ICD-10-CM

## 2018-03-19 PROCEDURE — 97140 MANUAL THERAPY 1/> REGIONS: CPT | Performed by: PHYSICAL THERAPIST

## 2018-03-19 PROCEDURE — 97110 THERAPEUTIC EXERCISES: CPT | Performed by: PHYSICAL THERAPIST

## 2018-03-19 NOTE — PROGRESS NOTES
Daily Note     Today's date: 3/19/2018  Patient name: Rodrigo Garcia  : 1965  MRN: [id number]  Referring provider: Namita Brown MD  Dx:   Encounter Diagnosis     ICD-10-CM    1   Secondary lymphedema I89 0                   Subjective: No new complaints      Objective: See treatment diary below  Precautions: anxiety, asthma, CA left breast, depression, HBP     Daily Treatment Diary      Manual  3/16 3/19  2/6  2/8  2/12  2/14  2/27  3/6  3/13     Manual drainage 12 min  12 min  10 min  x 10min  10 min  x 10 min  13 min  12 min  12 min     mobilizations distraction and inferior glide  distraction and inferior glide Grade !!  distraction and inferior glide  distraction and inferior glide  as last visit  as last visit  as last visit  as before As  before     PROM left shoulder flexion only Flexion only  15 each  15 flexion recheck others  as ast visit  as last visit  as last visit  flexoin only  flexion only                                                           Exercise Diary                        Wand flexion supine 5 # x 20  5# x20  1# x 20   2 # x  20  3# x 20  4# x 20  5# x 15  5# x 20  5# x 20  5# x 20   Wand abduction      20  1# x 20  DC to home              wand extension  5# x 20  5# x 20 20  1# x 20  2# x 20  3# x 20  4# x 20  5# x 15 5# x 15  5# x 20    wand internal rotation  5# x 15  5# x 15 20  1# x 20  2# x 20  3# x20  4# x 20  5# x 15  5# x 15  5# x 15    wall climbs flexion       x5  dc to home              shoulder flexion  4# x 10  4# x 10      x20  1# x 20  2# x 20  3# x 15  3# x 20  4# x 10    shoulder abduction  3# x 13  3# x 15      x 20  1# x20  2# x 15  2# x 15  2# x15  2# x 20    door way stretch x5 with 10 sec hold  x5 with 10 sec hold              x 3 with 10 sec hold  x5 with 10 sec hold                                                                                                                                                                                                                                                                                                      Swelling   Left shoulder swelling details:   14 cm above olecranon 31 cm  7 cm above 29 8 cm  Olecranon 26 5 cm  14 cm above ulnar styloid 25 9 cm  7 cm above 20 5 cm  Ulnar styloid 16 1 cm  mcp 19 cm  Right shoulder swelling details:   14 cm above olecranon 29 cm  7 cm above 27 cm  Olecranon 25 cm  14 cm above ulnar styloid 23 6 cm  7 cm above 18 8 cm  Ulnar styloid 15 6 cm  mcp 18 8           Assessment: Tolerated treatment well  Patient demonstrated fatigue post treatment      Plan: Continue per plan of care

## 2018-03-27 ENCOUNTER — OFFICE VISIT (OUTPATIENT)
Dept: PHYSICAL THERAPY | Facility: REHABILITATION | Age: 53
End: 2018-03-27
Payer: COMMERCIAL

## 2018-03-27 DIAGNOSIS — I89.0 SECONDARY LYMPHEDEMA: Primary | ICD-10-CM

## 2018-03-27 PROCEDURE — 97110 THERAPEUTIC EXERCISES: CPT | Performed by: PHYSICAL THERAPIST

## 2018-03-27 PROCEDURE — 97140 MANUAL THERAPY 1/> REGIONS: CPT | Performed by: PHYSICAL THERAPIST

## 2018-03-27 NOTE — PROGRESS NOTES
Daily Note     Today's date: 3/27/2018  Patient name: Johnnie Mcqueen  : 1965  MRN: [id number]  Referring provider: Ramila Amato MD  Dx:   Encounter Diagnosis     ICD-10-CM    1   Secondary lymphedema I89 0                   Subjective: No problems      Objective: See treatment diary below  Precautions: anxiety, asthma, CA left breast, depression, HBP     Daily Treatment Diary      Manual  3/16 3/19  3/27  2/8  2/12  2/14  2/27  3/6  3/13     Manual drainage 12 min  12 min  11 min  x 10min  10 min  x 10 min  13 min  12 min  12 min     mobilizations distraction and inferior glide  distraction and inferior glide Grade !!  distraction and inferior glide  distraction and inferior glide  as last visit  as last visit  as last visit  as before As  before     PROM left shoulder flexion only Flexion only  15 each  15 flexion recheck others  as ast visit  as last visit  as last visit  flexoin only  flexion only                                                           Exercise Diary                        Wand flexion supine 5 # x 20  5# x20  5# x 20   2 # x  20  3# x 20  4# x 20  5# x 15  5# x 20  5# x 20  5# x 20   Wand abduction      20  1# x 20  DC to home              wand extension  5# x 20  5# x 20 5# x20  1# x 20  2# x 20  3# x 20  4# x 20  5# x 15 5# x 15  5# x 20    wand internal rotation  5# x 15  5# x 15 5# x 15  1# x 20  2# x 20  3# x20  4# x 20  5# x 15  5# x 15  5# x 15    wall climbs flexion       x5  dc to home              shoulder flexion  4# x 10  4# x 10  4# x 15    x20  1# x 20  2# x 20  3# x 15  3# x 20  4# x 10    shoulder abduction  3# x 13  3# x 15  3# x 15    x 20  1# x20  2# x 15  2# x 15  2# x15  2# x 20    door way stretch x5 with 10 sec hold  x5 with 10 sec hold  x 5            x 3 with 10 sec hold  x5 with 10 sec hold                                                                                                                                                                                                                                                                                                      Swelling   Left shoulder swelling details:   14 cm above olecranon 31 cm  7 cm above 29 8 cm  Olecranon 26 5 cm  14 cm above ulnar styloid 25 9 cm  7 cm above 20 5 cm  Ulnar styloid 16 1 cm  mcp 19 cm  Right shoulder swelling details:   14 cm above olecranon 29 cm  7 cm above 27 cm  Olecranon 25 cm  14 cm above ulnar styloid 23 6 cm  7 cm above 18 8 cm  Ulnar styloid 15 6 cm  mcp 18 8      Assessment: Tolerated treatment well  Patient demonstrated fatigue post treatment      Plan: Continue per plan of care

## 2018-03-29 ENCOUNTER — OFFICE VISIT (OUTPATIENT)
Dept: PHYSICAL THERAPY | Facility: REHABILITATION | Age: 53
End: 2018-03-29
Payer: COMMERCIAL

## 2018-03-29 DIAGNOSIS — I89.0 SECONDARY LYMPHEDEMA: Primary | ICD-10-CM

## 2018-03-29 PROCEDURE — 97140 MANUAL THERAPY 1/> REGIONS: CPT | Performed by: PHYSICAL THERAPIST

## 2018-03-29 PROCEDURE — 97110 THERAPEUTIC EXERCISES: CPT | Performed by: PHYSICAL THERAPIST

## 2018-03-29 NOTE — PROGRESS NOTES
Daily Note     Today's date: 3/29/2018  Patient name: Michaela Garibay  : 1965  MRN: [id number]  Referring provider: Luther Herndon MD  Dx:   Encounter Diagnosis     ICD-10-CM    1  Secondary lymphedema I89 0                   Subjective: I've been carrying the baby around a lot   ( 22#)      Objective: See treatment diary below  Precautions: anxiety, asthma, CA left breast, depression, HBP     Daily Treatment Diary      Manual  3/16 3/19  3/27  3/29  2/12  2/14  2/27  3/6  3/13     Manual drainage 12 min  12 min  11 min  x 10min  10 min  x 10 min  13 min  12 min  12 min     mobilizations distraction and inferior glide  distraction and inferior glide Grade !!  distraction and inferior glide  distraction and inferior glide  as last visit  as last visit  as last visit  as before As  before     PROM left shoulder flexion only Flexion only  15 each  15 flexion recheck others  as ast visit  as last visit  as last visit  flexoin only  flexion only                                                           Exercise Diary                        Wand flexion supine 5 # x 20  5# x20  5# x 20   5 # x  25  3# x 20  4# x 20  5# x 15  5# x 20  5# x 20  5# x 20   Wand abduction      20  1# x 20  DC to home              wand extension  5# x 20  5# x 20 5# x20  5# x 20  2# x 20  3# x 20  4# x 20  5# x 15 5# x 15  5# x 20    wand internal rotation  5# x 15  5# x 15 5# x 15  5# x 20  2# x 20  3# x20  4# x 20  5# x 15  5# x 15  5# x 15    wall climbs flexion       x5  dc to home              shoulder flexion  4# x 10  4# x 10  4# x 15  4# x 20  x20  1# x 20  2# x 20  3# x 15  3# x 20  4# x 10    shoulder abduction  3# x 13  3# x 15  3# x 15  3# x 15   x 20  1# x20  2# x 15  2# x 15  2# x15  2# x 20    door way stretch x5 with 10 sec hold  x5 with 10 sec hold  x 5  x 5          x 3 with 10 sec hold  x5 with 10 sec hold                                                                                                                                                                                                                                                                                                      Swelling   Left shoulder swelling details:   14 cm above olecranon 32 6 cm  7 cm above 31 cm  Olecranon 26 8 cm  14 cm above ulnar styloid 26 3 cm  7 cm above 21 2 cm  Ulnar styloid 16 cm  mcp 19 cm  Right shoulder swelling details:   14 cm above olecranon 29 cm  7 cm above 27 cm  Olecranon 25 cm  14 cm above ulnar styloid 23 6 cm  7 cm above 18 8 cm  Ulnar styloid 15 6 cm  mcp 18 8             Assessment: Tolerated treatment well  Patient demonstrated fatigue post treatment  Increased edema today and is still waiting for her sleeve      Plan: Continue per plan of care

## 2018-04-04 ENCOUNTER — EVALUATION (OUTPATIENT)
Dept: PHYSICAL THERAPY | Facility: REHABILITATION | Age: 53
End: 2018-04-04
Payer: COMMERCIAL

## 2018-04-04 DIAGNOSIS — I89.0 SECONDARY LYMPHEDEMA: Primary | ICD-10-CM

## 2018-04-04 PROCEDURE — G8984 CARRY CURRENT STATUS: HCPCS | Performed by: PHYSICAL THERAPIST

## 2018-04-04 PROCEDURE — 97140 MANUAL THERAPY 1/> REGIONS: CPT | Performed by: PHYSICAL THERAPIST

## 2018-04-04 PROCEDURE — G8985 CARRY GOAL STATUS: HCPCS | Performed by: PHYSICAL THERAPIST

## 2018-04-04 PROCEDURE — 97110 THERAPEUTIC EXERCISES: CPT | Performed by: PHYSICAL THERAPIST

## 2018-04-04 NOTE — PROGRESS NOTES
PT Evaluation     Today's date: 2018  Patient name: Aisha Lara  : 1965  MRN: [id number]  Referring provider: Stacie Shaw MD  Dx:   Encounter Diagnosis   Name Primary?  Secondary lymphedema Yes       Start Time: 1008          Assessment  Impairments: abnormal or restricted ROM and pain with function  Other impairment: edema LUE   Functional limitations: inabiliity to lift arm fully overhead  Patient is not irritable  Assessment details: Increased ROM left shoulder with decrease in edema Stage I with no pitting noted  Would benefit from manual drainage with stretching and strengthening  Understanding of Dx/Px/POC: good   Prognosis: good    Goals  STG decrease pain 0-1/10  Increase ROM 25-50%  Increase strength 1/2 muscle grade  Decrease edema   25 cm  Return to independent ADL's  Full ROM   Full strength      Plan  Patient would benefit from: skilled PT  Referral necessary: No  Planned therapy interventions: manual therapy, joint mobilization, therapeutic exercise and home exercise program  Other planned therapy interventions: manual lymphatic drainage  Frequency: 2x week  Duration in weeks: 4  Treatment plan discussed with: family and patient  Plan details: Continue manual drainage to LUE with stretching and strengthening of left shoulder as tolerated          Subjective Evaluation    Pain  Current pain ratin  At best pain ratin  At worst pain ratin          Objective     Active Range of Motion   Left Shoulder   Flexion: 145 degrees   Abduction: 148 degrees     Passive Range of Motion   Left Shoulder   Flexion: 167 degrees   Abduction: 180 degrees   External rotation 90°: 90 degrees   Internal rotation 90°: 75 degrees     Strength/Myotome Testing     Left Shoulder     Planes of Motion   Flexion: 4   Abduction: 3+     Swelling   Left shoulder swelling details:   14 cm above olecranon 31 2 cm  7 cm above 30 6 cm  Olecranon 27 cm  14 cm above ulnar styloid 25 3 cm  7 cm above 21 cm  Ulnar styloid 16 cm  mcp 19 cm  Right shoulder swelling details:   14 cm above olecranon 29 cm  7 cm above 27 cm  Olecranon 25 cm  14 cm above ulnar styloid 23 6 cm  7 cm above 18 8 cm  Ulnar styloid 15 6 cm  mcp 18 8           Precautions: anxiety, asthma, CA left breast, depression, HBP     Daily Treatment Diary      Manual  3/16 3/19  3/27  3/29  4/4  2/14  2/27  3/6  3/13     Manual drainage 12 min  12 min  11 min  x 10min  12 min  x 10 min  13 min  12 min  12 min     mobilizations distraction and inferior glide  distraction and inferior glide Grade !!  distraction and inferior glide  distraction and inferior glide  as last visit  as last visit  as last visit  as before As  before     PROM left shoulder flexion only Flexion only  15 each  15 flexion recheck others  as ast visit  as last visit  as last visit  flexoin only  flexion only                                                           Exercise Diary                        Wand flexion supine 5 # x 20  5# x20  5# x 20   5 # x  25  5# x 20  4# x 20  5# x 15  5# x 20  5# x 20  5# x 20   Wand abduction      20  1# x 20  DC to home              wand extension  5# x 20  5# x 20 5# x20  5# x 20  5# x 20  3# x 20  4# x 20  5# x 15 5# x 15  5# x 20    wand internal rotation  5# x 15  5# x 15 5# x 15  5# x 20  5# x 20  3# x20  4# x 20  5# x 15  5# x 15  5# x 15    wall climbs flexion       x5  dc to home              shoulder flexion  4# x 10  4# x 10  4# x 15  4# x 20  4# x 20  1# x 20  2# x 20  3# x 15  3# x 20  4# x 10    shoulder abduction  3# x 13  3# x 15  3# x 15  3# x 15   3# x 15  1# x20  2# x 15  2# x 15  2# x15  2# x 20    door way stretch x5 with 10 sec hold  x5 with 10 sec hold  x 5  x 5          x 3 with 10 sec hold  x5 with 10 sec hold                                                                                                                                                                                                                                                                                                        Flowsheet Rows    Flowsheet Row Most Recent Value   PT/OT G-Codes   Current Score  55 5   Projected Score  65   FOTO information reviewed  Yes   Assessment Type  Re-evaluation   G code set  Carrying, Moving & Handling Objects   Carrying, Moving and Handling Objects Current Status ()  CK   Carrying, Moving and Handling Objects Goal Status ()  [place] Barthel

## 2018-04-06 ENCOUNTER — OFFICE VISIT (OUTPATIENT)
Dept: PHYSICAL THERAPY | Facility: REHABILITATION | Age: 53
End: 2018-04-06
Payer: COMMERCIAL

## 2018-04-06 DIAGNOSIS — I89.0 SECONDARY LYMPHEDEMA: Primary | ICD-10-CM

## 2018-04-06 PROCEDURE — 97110 THERAPEUTIC EXERCISES: CPT | Performed by: PHYSICAL THERAPIST

## 2018-04-06 PROCEDURE — 97140 MANUAL THERAPY 1/> REGIONS: CPT | Performed by: PHYSICAL THERAPIST

## 2018-04-06 NOTE — PROGRESS NOTES
Daily Note     Today's date: 2018  Patient name: Anish Rosado  : 1965  MRN: [id number]  Referring provider: Saskia Gay MD  Dx:   Encounter Diagnosis     ICD-10-CM    1  Secondary lymphedema I89 0                   Subjective: They haven't ordered the sleeve yet and they don't know why   They are supposed to call back      Objective: See treatment diary below    Precautions: anxiety, asthma, CA left breast, depression, HBP     Daily Treatment Diary      Manual  3/16 3/19  3/27  3/29  4/4  4/6  2/27  3/6  3/13     Manual drainage 12 min  12 min  11 min  x 10min X 12 min  x 10 min  13 min  12 min  12 min     mobilizations distraction and inferior glide  distraction and inferior glide Grade !!  distraction and inferior glide  distraction and inferior glide As last time As before  as last visit  as before As  before     PROM left shoulder flexion only Flexion only  15 each  15 flexion recheck others  as ast visit  as last visit  as last visit  flexoin only  flexion only                                                           Exercise Diary                        Wand flexion supine 5 # x 20  5# x20  5# x 20   5 # x  25  5# x 20  5# x 20  5# x 15  5# x 20  5# x 20  5# x 20   Wand abduction      20  1# x 20  DC to home              wand extension  5# x 20  5# x 20 5# x20  5# x 20  5# x 20  5# x 20  4# x 20  5# x 15 5# x 15  5# x 20    wand internal rotation  5# x 15  5# x 15 5# x 15  5# x 20  5# x 20  5# x20  4# x 20  5# x 15  5# x 15  5# x 15    wall climbs flexion       x5  dc to home              shoulder flexion  4# x 10  4# x 10  4# x 15  4# x 20  4# x 20  5# x 10  2# x 20  3# x 15  3# x 20  4# x 10    shoulder abduction  3# x 13  3# x 15  3# x 15  3# x 15   3# x 15  3# x 15  2# x 15  2# x 15  2# x15  2# x 20    door way stretch x5 with 10 sec hold  x5 with 10 sec hold  x 5  x 5  x5  x5      x 3 with 10 sec hold  x5 with 10 sec hold                                                                                                                                                                                                                                                                                                        Assessment: Tolerated treatment well  Patient demonstrated fatigue post treatment      Plan: Continue per plan of care

## 2018-04-11 ENCOUNTER — OFFICE VISIT (OUTPATIENT)
Dept: PHYSICAL THERAPY | Facility: REHABILITATION | Age: 53
End: 2018-04-11
Payer: COMMERCIAL

## 2018-04-11 DIAGNOSIS — I89.0 SECONDARY LYMPHEDEMA: Primary | ICD-10-CM

## 2018-04-11 PROCEDURE — 97140 MANUAL THERAPY 1/> REGIONS: CPT | Performed by: PHYSICAL THERAPIST

## 2018-04-11 PROCEDURE — 97110 THERAPEUTIC EXERCISES: CPT | Performed by: PHYSICAL THERAPIST

## 2018-04-11 NOTE — PROGRESS NOTES
Daily Note     Today's date: 2018  Patient name: Corrina Schroeder  : 1965  MRN: [id number]  Referring provider: Toshia Weldon MD  Dx:   Encounter Diagnosis     ICD-10-CM    1   Secondary lymphedema I89 0                   Subjective: No new complaints      Objective: See treatment diary below  Precautions: anxiety, asthma, CA left breast, depression, HBP     Daily Treatment Diary      Manual  3/16 3/19  3/27  3/29  4/4  4/6  4/11  3/6  3/13     Manual drainage 12 min  12 min  11 min  x 10min X 12 min  x 10 min  10 min  12 min  12 min     mobilizations distraction and inferior glide  distraction and inferior glide Grade !!  distraction and inferior glide  distraction and inferior glide As last time As before  as last visit  as before As  before     PROM left shoulder flexion only Flexion only  15 each  15 flexion recheck others  as ast visit  as last visit  as last visit  flexoin only  flexion only                                                           ExerciDiary                        Wand flexion supine 5 # x 20  5# x20  5# x 20   5 # x  25  5# x 20  5# x 20  5# x 25  5# x 20  5# x 20  5# x 20   Wand abduction      20  1# x 20  DC to home              wand extension  5# x 20  5# x 20 5# x20  5# x 20  5# x 20  5# x 20  5# x 20  5# x 15 5# x 15  5# x 20    wand internal rotation  5# x 15  5# x 15 5# x 15  5# x 20  5# x 20  5# x20  5# x 15  5# x 15  5# x 15  5# x 15    wall climbs flexion       x5  dc to home              shoulder flexion  4# x 10  4# x 10  4# x 15  4# x 20  4# x 20  5# x 10  5# x 10   3# x 15  3# x 20  4# x 10    shoulder abduction  3# x 13  3# x 15  3# x 15  3# x 15   3# x 15  3# x 15  2# x 15  2# x 15  2# x15  2# x 20    door way stretch x5 with 10 sec hold  x5 with 10 sec hold  x 5  x 5  x5  x5  x5    x 3 with 10 sec hold  x5 with 10 sec hold                                                                                                                                                                                                                                                                                                            Assessment: Tolerated treatment well  Patient demonstrated fatigue post treatment  Upper arm much looser today      Plan: Continue per plan of care

## 2018-04-13 ENCOUNTER — OFFICE VISIT (OUTPATIENT)
Dept: PHYSICAL THERAPY | Facility: REHABILITATION | Age: 53
End: 2018-04-13
Payer: COMMERCIAL

## 2018-04-13 DIAGNOSIS — I89.0 SECONDARY LYMPHEDEMA: Primary | ICD-10-CM

## 2018-04-13 PROCEDURE — 97110 THERAPEUTIC EXERCISES: CPT | Performed by: PHYSICAL THERAPIST

## 2018-04-13 PROCEDURE — 97140 MANUAL THERAPY 1/> REGIONS: CPT | Performed by: PHYSICAL THERAPIST

## 2018-04-13 NOTE — PROGRESS NOTES
Daily Note     Today's date: 2018  Patient name: Tereso Lee  : 1965  MRN: [id number]  Referring provider: Hilda John MD  Dx:   Encounter Diagnosis     ICD-10-CM    1   Secondary lymphedema I89 0                   Subjective: Still nothing on the sleeve      Objective: See treatment diary below  Precautions: anxiety, asthma, CA left breast, depression, HBP     Daily Treatment Diary      Manual  3/16 3/19  3/27  3/29  4/4  4/6  4/11  4/13  3/13     Manual drainage 12 min  12 min  11 min  x 10min X 12 min  x 10 min  10 min  12 min  12 min     mobilizations distraction and inferior glide  distraction and inferior glide Grade !!  distraction and inferior glide  distraction and inferior glide As last time As before  as last visit  as before As  before     PROM left shoulder flexion only Flexion only  15 each  15 flexion recheck others  as ast visit  as last visit  as last visit  flexoin only  flexion only                                                           ExerciDiary                        Wand flexion supine 5 # x 20  5# x20  5# x 20   5 # x  25  5# x 20  5# x 20  5# x 25  5# x 25  5# x 20  5# x 20   Wand abduction      20  1# x 20  DC to home              wand extension  5# x 20  5# x 20 5# x20  5# x 20  5# x 20  5# x 20  5# x 20  5# x 20 5# x 15  5# x 20    wand internal rotation  5# x 15  5# x 15 5# x 15  5# x 20  5# x 20  5# x20  5# x 15  5# x 20  5# x 15  5# x 15    wall climbs flexion       x5  dc to home              shoulder flexion  4# x 10  4# x 10  4# x 15  4# x 20  4# x 20  5# x 10  5# x 10   5# x 15  3# x 20  4# x 10    shoulder abduction  3# x 13  3# x 15  3# x 15  3# x 15   3# x 15  3# x 15  3# x 15  3# x 15  2# x15  2# x 20    door way stretch x5 with 10 sec hold  x5 with 10 sec hold  x 5  x 5  x5  x5  x5  x5  x 3 with 10 sec hold  x5 with 10 sec hold                                                                                                                                                                                                                                                                                                     Swelling   Left shoulder swelling details:   14 cm above olecranon 30 5 cm  7 cm above 29 5 cm  Olecranon 26 3 cm  14 cm above ulnar styloid 25 1 cm  7 cm above 20 3 cm  Ulnar styloid 16 cm  mcp 19 cm  Right shoulder swelling details:   14 cm above olecranon 29 cm  7 cm above 27 cm  Olecranon 25 cm  14 cm above ulnar styloid 23 6 cm  7 cm above 18 8 cm  Ulnar styloid 15 6 cm  mcp 18 8  Assessment: Tolerated treatment well  Patient demonstrated fatigue post treatment  Significant decrease in edema today      Plan: Continue per plan of care

## 2018-04-16 ENCOUNTER — OFFICE VISIT (OUTPATIENT)
Dept: PHYSICAL THERAPY | Facility: REHABILITATION | Age: 53
End: 2018-04-16
Payer: COMMERCIAL

## 2018-04-16 DIAGNOSIS — I89.0 SECONDARY LYMPHEDEMA: Primary | ICD-10-CM

## 2018-04-16 PROCEDURE — 97140 MANUAL THERAPY 1/> REGIONS: CPT | Performed by: PHYSICAL THERAPIST

## 2018-04-16 NOTE — PROGRESS NOTES
Daily Note     Today's date: 2018  Patient name: Herlinda Bill  : 1965  MRN: [id number]  Referring provider: Rl Carrasco MD  Dx:   Encounter Diagnosis     ICD-10-CM    1   Secondary lymphedema I89 0                   Subjective: no new complaints      Objective: See treatment diary below  Precautions: anxiety, asthma, CA left breast, depression, HBP     Daily Treatment Diary      Manual  3/16 3/19  3/27  3/29  4/4  4/6  4/11  4/13  4/16     Manual drainage 12 min  12 min  11 min  x 10min X 12 min  x 10 min  10 min  12 min  12 min     mobilizations distraction and inferior glide  distraction and inferior glide Grade !!  distraction and inferior glide  distraction and inferior glide As last time As before  as last visit  as before As  before     PROM left shoulder flexion only Flexion only  15 each  15 flexion recheck others  as ast visit  as last visit  as last visit  flexoin only  flexion only      tubigrip size C/D                 forearm C upper arm D                                   ExerciDiary                        Wand flexion supine 5 # x 20  5# x20  5# x 20   5 # x  25  5# x 20  5# x 20  5# x 25  5# x 25  5# x 25  5# x 20   Wand abduction      20  1# x 20  DC to home              wand extension  5# x 20  5# x 20 5# x20  5# x 20  5# x 20  5# x 20  5# x 20  5# x 20 5# x 20  5# x 20    wand internal rotation  5# x 15  5# x 15 5# x 15  5# x 20  5# x 20  5# x20  5# x 15  5# x 20  5# x 20  5# x 15    wall climbs flexion       x5  dc to home              shoulder flexion  4# x 10  4# x 10  4# x 15  4# x 20  4# x 20  5# x 10  5# x 10   5# x 15  5# x 15  4# x 10    shoulder abduction  3# x 13  3# x 15  3# x 15  3# x 15   3# x 15  3# x 15  3# x 15  3# x 15 3# x 18  2# x 20    door way stretch x5 with 10 sec hold  x5 with 10 sec hold  x 5  x 5  x5  x5  x5  x5  x 5 with 10 sec hold  x5 with 10 sec hold                                                                                                                                                                                                                                                                                                      Swelling   Left shoulder swelling details:   14 cm above olecranon 30 5 cm  7 cm above 29 5 cm  Olecranon 26 3 cm  14 cm above ulnar styloid 25 1 cm  7 cm above 20 3 cm  Ulnar styloid 16 cm  mcp 19 cm  Right shoulder swelling details:   14 cm above olecranon 29 cm  7 cm above 27 cm  Olecranon 25 cm  14 cm above ulnar styloid 23 6 cm  7 cm above 18 8 cm  Ulnar styloid 15 6 cm  mcp 18 8        Assessment: Tolerated treatment well  Patient demonstrated fatigue post treatment  Fitted with tubigrip for flight tomorrow since she still doesn't have her sleeve  Plan: Continue per plan of care

## 2018-04-27 ENCOUNTER — EVALUATION (OUTPATIENT)
Dept: PHYSICAL THERAPY | Facility: REHABILITATION | Age: 53
End: 2018-04-27
Payer: COMMERCIAL

## 2018-04-27 DIAGNOSIS — I89.0 SECONDARY LYMPHEDEMA: Primary | ICD-10-CM

## 2018-04-27 PROCEDURE — 97110 THERAPEUTIC EXERCISES: CPT | Performed by: PHYSICAL THERAPIST

## 2018-04-27 PROCEDURE — G8984 CARRY CURRENT STATUS: HCPCS | Performed by: PHYSICAL THERAPIST

## 2018-04-27 PROCEDURE — 97140 MANUAL THERAPY 1/> REGIONS: CPT | Performed by: PHYSICAL THERAPIST

## 2018-04-27 PROCEDURE — G8985 CARRY GOAL STATUS: HCPCS | Performed by: PHYSICAL THERAPIST

## 2018-04-27 NOTE — PROGRESS NOTES
PT Re-Evaluation     Today's date: 2018  Patient name: Keagan Rice  : 1965  MRN: [id number]  Referring provider: Mike Carson MD  Dx:   Encounter Diagnosis   Name Primary?  Secondary lymphedema Yes       Start Time: 1030  Stop Time: 1057  Total time in clinic (min): 27 minutes    Assessment  Impairments: abnormal or restricted ROM and pain with function  Other impairment: edema LUE   Functional limitations: inabiliity to lift arm fully overhead  Patient is not irritable  Assessment details: Increased ROM left shoulder with decrease in edema Stage I with no pitting noted  Slight increase post flight even with use of tubigrip since sleeve did not come in  Would benefit from manual drainage with stretching and strengthening  Understanding of Dx/Px/POC: good   Prognosis: good    Goals  STG decrease pain 0-1/10  Increase ROM 25-50%  Increase strength 1/2 muscle grade  Decrease edema   25 cm  Return to independent ADL's  Full ROM   Full strength      Plan  Patient would benefit from: skilled PT  Referral necessary: No  Planned therapy interventions: manual therapy, joint mobilization, therapeutic exercise and home exercise program  Other planned therapy interventions: manual lymphatic drainage  Frequency: 2x week  Duration in weeks: 4  Treatment plan discussed with: family and patient  Plan details: Continue manual drainage to LUE with stretching and strengthening of left shoulder as tolerated          Subjective Evaluation    Pain  Current pain ratin  At best pain ratin  At worst pain ratin          Objective     Active Range of Motion   Left Shoulder   Flexion: 154 degrees   Abduction: 148 degrees     Passive Range of Motion   Left Shoulder   Flexion: 158 degrees   Abduction: 180 degrees   External rotation 90°: 90 degrees   Internal rotation 90°: 75 degrees     Strength/Myotome Testing     Left Shoulder     Planes of Motion   Flexion: 4   Abduction: 3+     Swelling   Left shoulder swelling details:   14 cm above olecranon 30 6 cm  7 cm above 30 cm  Olecranon 27 cm  14 cm above ulnar styloid 26 cm  7 cm above 20 9 cm  Ulnar styloid 16 3 cm  mcp 19 cm  Right shoulder swelling details:   14 cm above olecranon 29 cm  7 cm above 27 cm  Olecranon 25 cm  14 cm above ulnar styloid 23 6 cm  7 cm above 18 8 cm  Ulnar styloid 15 6 cm  mcp 18 8      Precautions: anxiety, asthma, CA left breast, depression, HBP     Daily Treatment Diary      Manual  3/16 3/19  3/27  3/29  4/4  4/6  4/11  4/13  4/16  4/29   Manual drainage 12 min  12 min  11 min  x 10min X 12 min  x 10 min  10 min  12 min  12 min  14 min   mobilizations distraction and inferior glide  distraction and inferior glide Grade !!  distraction and inferior glide  distraction and inferior glide As last time As before  as last visit  as before As  before As before   PROM left shoulder flexion only Flexion only  15 each  15 flexion recheck others  as ast visit  as last visit  as last visit  flexoin only  flexion only  flexion only    tubigrip size C/D                 forearm C upper arm D                                   ExerciDiary                        Wand flexion supine 5 # x 20  5# x20  5# x 20   5 # x  25  5# x 20  5# x 20  5# x 25  5# x 25  5# x 25  5# x 25   Wand abduction      20  1# x 20  DC to home              wand extension  5# x 20  5# x 20 5# x20  5# x 20  5# x 20  5# x 20  5# x 20  5# x 20 5# x 20  5# x 20    wand internal rotation  5# x 15  5# x 15 5# x 15  5# x 20  5# x 20  5# x20  5# x 15  5# x 20  5# x 20  5# x 25    wall climbs flexion       x5  dc to home              shoulder flexion  4# x 10  4# x 10  4# x 15  4# x 20  4# x 20  5# x 10  5# x 10   5# x 15  5# x 15 5# x 20    shoulder abduction  3# x 13  3# x 15  3# x 15  3# x 15   3# x 15  3# x 15  3# x 15  3# x 15 3# x 18  3# x 15    door way stretch x5 with 10 sec hold  x5 with 10 sec hold  x 5  x 5  x5  x5  x5  x5  x 5 with 10 sec hold  x5 with 10 sec hold

## 2018-05-08 ENCOUNTER — TELEPHONE (OUTPATIENT)
Dept: HEMATOLOGY ONCOLOGY | Facility: CLINIC | Age: 53
End: 2018-05-08

## 2018-05-08 NOTE — TELEPHONE ENCOUNTER
Yoana Dubon daughter, called stating that she wanted to see if the paperwork that was given to our office for medicare assistance was completed so she can pick it up or we can send it  She stated that she was called and wanted to see what the call was about  Please contact her at [phone]

## 2018-05-08 NOTE — TELEPHONE ENCOUNTER
Paperwork faxed to number on paper on 5/7/18  Left [facility] for pt's daughter that it was faxed and if she needs a copy one would be at [facility] office for her to

## 2018-05-10 ENCOUNTER — OFFICE VISIT (OUTPATIENT)
Dept: PHYSICAL THERAPY | Facility: REHABILITATION | Age: 53
End: 2018-05-10
Payer: COMMERCIAL

## 2018-05-10 DIAGNOSIS — I89.0 SECONDARY LYMPHEDEMA: Primary | ICD-10-CM

## 2018-05-10 PROCEDURE — 97140 MANUAL THERAPY 1/> REGIONS: CPT | Performed by: PHYSICAL THERAPIST

## 2018-05-10 PROCEDURE — 97110 THERAPEUTIC EXERCISES: CPT | Performed by: PHYSICAL THERAPIST

## 2018-05-10 NOTE — PROGRESS NOTES
Daily Note     Today's date: 5/10/2018  Patient name: Louanne Kussmaul  : 1965  MRN: [id number]  Referring provider: Tracy Rosenberg MD  Dx:   Encounter Diagnosis     ICD-10-CM    1   Secondary lymphedema I89 0                   Subjective: I just got the sleeve today      Objective: See treatment diary below  Swelling   Left shoulder swelling details:   14 cm above olecranon 31 3 cm  7 cm above 29 4 cm  Olecranon 26 3 cm  14 cm above ulnar styloid 26 2 cm  7 cm above 20 7 cm  Ulnar styloid 16 cm  mcp 19 cm  Right shoulder swelling details:   14 cm above olecranon 29 cm  7 cm above 27 cm  Olecranon 25 cm  14 cm above ulnar styloid 23 6 cm  7 cm above 18 8 cm  Ulnar styloid 15 6 cm  mcp 18 8  Precautions: anxiety, asthma, CA left breast, depression, HBP     Daily Treatment Diary      Manual  5/10 3/19  3/27  3/29  4/4  4/6  4/11  4/13  4/16  4/29   Manual drainage 13 min  12 min  11 min  x 10min X 12 min  x 10 min  10 min  12 min  12 min  14 min   mobilizations distraction and inferior glide  distraction and inferior glide Grade !!  distraction and inferior glide  distraction and inferior glide As last time As before  as last visit  as before As  before As before   PROM left shoulder flexion only Flexion only  15 each  15 flexion recheck others  as ast visit  as last visit  as last visit  flexoin only  flexion only  flexion only    tubigrip size C/D                 forearm C upper arm D                                   ExerciDiary                        Wand flexion supine 5 # x 25  5# x20  5# x 20   5 # x  25  5# x 20  5# x 20  5# x 25  5# x 25  5# x 25  5# x 25   Wand abduction      20  1# x 20  DC to home              wand extension  5# x 20  5# x 20 5# x20  5# x 20  5# x 20  5# x 20  5# x 20  5# x 20 5# x 20  5# x 20    wand internal rotation  5# x 25  5# x 15 5# x 15  5# x 20  5# x 20  5# x20  5# x 15  5# x 20  5# x 20  5# x 25    wall climbs flexion       x5  dc to home              shoulder flexion  5# x 20  4# x 10  4# x 15  4# x 20  4# x 20  5# x 10  5# x 10   5# x 15  5# x 15 5# x 20    shoulder abduction  3# x 15  3# x 15  3# x 15  3# x 15   3# x 15  3# x 15  3# x 15  3# x 15 3# x 18  3# x 15    door way stretch x5 with 10 sec hold  x5 with 10 sec hold  x 5  x 5  x5  x5  x5  x5  x 5 with 10 sec hold  x5 with 10 sec hold                                                                                                                                                                                                                                                                                                          Assessment: Tolerated treatment well  Patient demonstrated fatigue post treatment      Plan: Continue per plan of care

## 2018-05-11 DIAGNOSIS — C50.812 MALIGNANT NEOPLASM OF OVERLAPPING SITES OF LEFT FEMALE BREAST (HCC): ICD-10-CM

## 2018-05-11 DIAGNOSIS — C50.412 MALIGNANT NEOPLASM OF UPPER-OUTER QUADRANT OF LEFT FEMALE BREAST (HCC): ICD-10-CM

## 2018-05-15 ENCOUNTER — APPOINTMENT (OUTPATIENT)
Dept: PHYSICAL THERAPY | Facility: REHABILITATION | Age: 53
End: 2018-05-15
Payer: COMMERCIAL

## 2018-05-17 ENCOUNTER — OFFICE VISIT (OUTPATIENT)
Dept: PHYSICAL THERAPY | Facility: REHABILITATION | Age: 53
End: 2018-05-17
Payer: COMMERCIAL

## 2018-05-17 DIAGNOSIS — I89.0 SECONDARY LYMPHEDEMA: Primary | ICD-10-CM

## 2018-05-17 PROCEDURE — 97110 THERAPEUTIC EXERCISES: CPT | Performed by: PHYSICAL THERAPIST

## 2018-05-17 PROCEDURE — 97140 MANUAL THERAPY 1/> REGIONS: CPT | Performed by: PHYSICAL THERAPIST

## 2018-05-17 NOTE — PROGRESS NOTES
Daily Note     Today's date: 2018  Patient name: Kimmie Clark  : 1965  MRN: [id number]  Referring provider: Solitario Toussaint MD  Dx:   Encounter Diagnosis     ICD-10-CM    1   Secondary lymphedema I89 0                   Subjective: I'm wearing my sleeve      Objective: See treatment diary below  Swelling   Left shoulder swelling details:   14 cm above olecranon 31 2 cm  7 cm above 29 4 cm  Olecranon 26 3 cm  14 cm above ulnar styloid 25 5 cm  7 cm above 20 cm  Ulnar styloid 16 cm  mcp 18 7 cm  Right shoulder swelling details:   14 cm above olecranon 29 cm  7 cm above 27 cm  Olecranon 25 cm  14 cm above ulnar styloid 23 6 cm  7 cm above 18 8 cm  Ulnar styloid 15 6 cm  mcp 18 8  Precautions: anxiety, asthma, CA left breast, depression, HBP     Daily Treatment Diary      Manual  5/10 5/17  3/27  3/29  4/4  4/6  4/11  4/13  4/16  4/29   Manual drainage 13 min  17 min  11 min  x 10min X 12 min  x 10 min  10 min  12 min  12 min  14 min   mobilizations distraction and inferior glide  distraction and inferior glide Grade II  distraction and inferior glide  distraction and inferior glide As last time As before  as last visit  as before As  before As before   PROM left shoulder flexion only Flexion only  15 each  15 flexion recheck others  as ast visit  as last visit  as last visit  flexoin only  flexion only  flexion only    tubigrip size C/D                 forearm C upper arm D                                   ExerciDiary                        Wand flexion supine 5 # x 25  5# x25  5# x 20   5 # x  25  5# x 20  5# x 20  5# x 25  5# x 25  5# x 25  5# x 25   Wand abduction      20  1# x 20  DC to home              wand extension  5# x 20  5# x 20 5# x20  5# x 20  5# x 20  5# x 20  5# x 20  5# x 20 5# x 20  5# x 20    wand internal rotation  5# x 25  5# x 25 5# x 15  5# x 20  5# x 20  5# x20  5# x 15  5# x 20  5# x 20  5# x 25    wall climbs flexion       x5  dc to home              shoulder flexion  5# x 20  5# x 10  4# x 15  4# x 20  4# x 20  5# x 10  5# x 10   5# x 15  5# x 15 5# x 20    shoulder abduction  3# x 15  3# x 15  3# x 15  3# x 15   3# x 15  3# x 15  3# x 15  3# x 15 3# x 18  3# x 15    door way stretch x5 with 10 sec hold  x5 with 10 sec hold  x 5  x 5  x5  x5  x5  x5  x 5 with 10 sec hold  x5 with 10 sec hold                                                                                                                                                                                                                                                                                                          Assessment: Tolerated treatment well  Patient demonstrated fatigue post treatment      Plan: Continue per plan of care

## 2018-05-18 ENCOUNTER — HOSPITAL ENCOUNTER (OUTPATIENT)
Dept: MAMMOGRAPHY | Facility: CLINIC | Age: 53
Discharge: HOME | End: 2018-05-18
Payer: COMMERCIAL

## 2018-05-18 DIAGNOSIS — C50.812 MALIGNANT NEOPLASM OF OVERLAPPING SITES OF LEFT FEMALE BREAST (HCC): ICD-10-CM

## 2018-05-18 PROCEDURE — 77065 DX MAMMO INCL CAD UNI: CPT

## 2018-05-18 PROCEDURE — G0279 TOMOSYNTHESIS, MAMMO: HCPCS

## 2018-05-22 ENCOUNTER — OFFICE VISIT (OUTPATIENT)
Dept: PHYSICAL THERAPY | Facility: REHABILITATION | Age: 53
End: 2018-05-22
Payer: COMMERCIAL

## 2018-05-22 DIAGNOSIS — I89.0 SECONDARY LYMPHEDEMA: Primary | ICD-10-CM

## 2018-05-22 PROCEDURE — 97140 MANUAL THERAPY 1/> REGIONS: CPT | Performed by: PHYSICAL THERAPIST

## 2018-05-22 PROCEDURE — 97110 THERAPEUTIC EXERCISES: CPT | Performed by: PHYSICAL THERAPIST

## 2018-05-22 NOTE — PROGRESS NOTES
Daily Note     Today's date: 2018  Patient name: Zoie Smith  : 1965  MRN: [id number]  Referring provider: Bettina Aleman MD  Dx:   Encounter Diagnosis     ICD-10-CM    1   Secondary lymphedema I89 0                   Subjective: I feel less pressure since wearing the sleeve      Objective: See treatment diary below  Swelling   Left shoulder swelling details:   14 cm above olecranon 31 2 cm  7 cm above 29 4 cm  Olecranon 26 3 cm  14 cm above ulnar styloid 25 5 cm  7 cm above 20 cm  Ulnar styloid 16 cm  mcp 18 7 cm  Right shoulder swelling details:   14 cm above olecranon 29 cm  7 cm above 27 cm  Olecranon 25 cm  14 cm above ulnar styloid 23 6 cm  7 cm above 18 8 cm  Ulnar styloid 15 6 cm  mcp 18 8  Precautions: anxiety, asthma, CA left breast, depression, HBP     Daily Treatment Diary      Manual  5/10 5/17  5/22  3/29  4/4  4/6  4/11  4/13  4/16  4/29   Manual drainage 13 min  17 min  9 min  x 10min X 12 min  x 10 min  10 min  12 min  12 min  14 min   mobilizations distraction and inferior glide  distraction and inferior glide Grade II  distraction and inferior glide  distraction and inferior glide As last time As before  as last visit  as before As  before As before   PROM left shoulder flexion only Flexion only  15 each  15 flexion recheck others  as ast visit  as last visit  as last visit  flexoin only  flexion only  flexion only    tubigrip size C/D                 forearm C upper arm D                                   ExerciDiary                        Wand flexion supine 5 # x 25  5# x25  5# x 25   5 # x  25  5# x 20  5# x 20  5# x 25  5# x 25  5# x 25  5# x 25   Wand abduction       1# x 20  DC to home              wand extension  5# x 20  5# x 20 5# x20  5# x 20  5# x 20  5# x 20  5# x 20  5# x 20 5# x 20  5# x 20    wand internal rotation  5# x 25  5# x 25 5# x 25  5# x 20  5# x 20  5# x20  5# x 15  5# x 20  5# x 20  5# x 25    wall climbs flexion       x5  dc to home            shoulder flexion  5# x 20  5# x 10  5# x 20  4# x 20  4# x 20  5# x 10  5# x 10   5# x 15  5# x 15 5# x 20    shoulder abduction  3# x 15  3# x 15  3# x 20  3# x 15   3# x 15  3# x 15  3# x 15  3# x 15 3# x 18  3# x 15    door way stretch x5 with 10 sec hold  x5 with 10 sec hold  x 5  x 5  x5  x5  x5  x5  x 5 with 10 sec hold  x5 with 10 sec hold                                                                                                                                                                                                                                                                                                          Assessment: Tolerated treatment well  Patient demonstrated fatigue post treatment      Plan: Continue per plan of care

## 2018-05-29 ENCOUNTER — APPOINTMENT (OUTPATIENT)
Dept: PHYSICAL THERAPY | Facility: REHABILITATION | Age: 53
End: 2018-05-29
Payer: COMMERCIAL

## 2018-05-31 ENCOUNTER — OFFICE VISIT (OUTPATIENT)
Dept: PHYSICAL THERAPY | Facility: REHABILITATION | Age: 53
End: 2018-05-31
Payer: COMMERCIAL

## 2018-05-31 DIAGNOSIS — I89.0 SECONDARY LYMPHEDEMA: Primary | ICD-10-CM

## 2018-05-31 PROCEDURE — 97110 THERAPEUTIC EXERCISES: CPT | Performed by: PHYSICAL THERAPIST

## 2018-05-31 PROCEDURE — 97140 MANUAL THERAPY 1/> REGIONS: CPT | Performed by: PHYSICAL THERAPIST

## 2018-05-31 PROCEDURE — G8985 CARRY GOAL STATUS: HCPCS | Performed by: PHYSICAL THERAPIST

## 2018-05-31 PROCEDURE — G8984 CARRY CURRENT STATUS: HCPCS | Performed by: PHYSICAL THERAPIST

## 2018-05-31 NOTE — PROGRESS NOTES
PT Re-Evaluation     Today's date: 2018  Patient name: Zoie Smith  : 1965  MRN: [id number]  Referring provider: Bettina Aleman MD  Dx:   Encounter Diagnosis   Name Primary?  Secondary lymphedema Yes       Start Time:           Assessment  Impairments: abnormal or restricted ROM and pain with function  Other impairment: edema LUE   Functional limitations: inabiliity to lift arm fully overheadPatient presents with symptom irritability no  Assessment details:  ROM stable left shoulder with decrease in edema Stage I with no pitting noted  Better control since wearing sleeve and would benefit from further manual drainage  Understanding of Dx/Px/POC: good   Prognosis: good    Goals  STG   Increase ROM 25-50%  Increase strength 1/2 muscle grade  Decrease edema   25 cm  Return to independent ADL's  Full ROM   Full strength      Plan  Patient would benefit from: skilled PT  Referral necessary: No  Planned therapy interventions: manual therapy, joint mobilization, therapeutic exercise and home exercise program  Other planned therapy interventions: manual lymphatic drainage  Frequency: 2x week  Duration in weeks: 4  Treatment plan discussed with: family and patient  Plan details: Continue manual drainage to LUE with stretching and strengthening of left shoulder as tolerated          Subjective Evaluation    Pain  Current pain ratin  At best pain ratin  At worst pain ratin          Objective     Active Range of Motion   Left Shoulder   Flexion: 154 degrees   Abduction: 148 degrees     Passive Range of Motion   Left Shoulder   Flexion: 158 degrees   Abduction: 180 degrees   External rotation 90°: 90 degrees   Internal rotation 90°: 75 degrees     Strength/Myotome Testing     Left Shoulder     Planes of Motion   Flexion: 4   Abduction: 3+     Swelling   Left shoulder swelling details:   14 cm above olecranon 30 3 cm  7 cm above 29 3 cm  Olecranon 26 cm  14 cm above ulnar styloid 25 5 cm  7 cm above 20 6 cm  Ulnar styloid 16 cm  mcp 19 cm  Right shoulder swelling details:   14 cm above olecranon 29 cm  7 cm above 27 cm  Olecranon 25 cm  14 cm above ulnar styloid 23 6 cm  7 cm above 18 8 cm  Ulnar styloid 15 6 cm  mcp 18 8      Precautions: anxiety, asthma, CA left breast, depression, HBP     Daily Treatment Diary      Manual  5/10 5/17  5/22  5/31  4/4  4/6  4/11  4/13  4/16  4/29   Manual drainage 13 min  17 min  9 min  x 10min X 12 min  x 10 min  10 min  12 min  12 min  14 min   mobilizations distraction and inferior glide  distraction and inferior glide Grade II  distraction and inferior glide  distraction and inferior glide As last time As before  as last visit  as before As  before As before   PROM left shoulder flexion only Flexion only  15 each  15 flexion recheck others  as ast visit  as last visit  as last visit  flexoin only  flexion only  flexion only    tubigrip size C/D                 forearm C upper arm D                                   ExerciDiary                        Wand flexion supine 5 # x 25  5# x25  5# x 25   5 # x  25  5# x 20  5# x 20  5# x 25  5# x 25  5# x 25  5# x 25   Wand abduction          DC to home              wand extension  5# x 20  5# x 20 5# x20  5# x 25  5# x 20  5# x 20  5# x 20  5# x 20 5# x 20  5# x 20    wand internal rotation  5# x 25  5# x 25 5# x 25  5# x 25  5# x 20  5# x20  5# x 15  5# x 20  5# x 20  5# x 25    wall climbs flexion         dc to home              shoulder flexion  5# x 20  5# x 10  5# x 20  5# x 20  4# x 20  5# x 10  5# x 10   5# x 15  5# x 15 5# x 20    shoulder abduction  3# x 15  3# x 15  3# x 20  3# x 20  3# x 15  3# x 15  3# x 15  3# x 15 3# x 18  3# x 15    door way stretch x5 with 10 sec hold  x5 with 10 sec hold  x 5  x 5  x5  x5  x5  x5  x 5 with 10 sec hold  x5 with 10 sec hold

## 2018-06-06 ENCOUNTER — OFFICE VISIT (OUTPATIENT)
Dept: PHYSICAL THERAPY | Facility: REHABILITATION | Age: 53
End: 2018-06-06
Payer: COMMERCIAL

## 2018-06-06 DIAGNOSIS — I89.0 SECONDARY LYMPHEDEMA: Primary | ICD-10-CM

## 2018-06-06 PROCEDURE — 97140 MANUAL THERAPY 1/> REGIONS: CPT | Performed by: PHYSICAL THERAPIST

## 2018-06-06 PROCEDURE — 97110 THERAPEUTIC EXERCISES: CPT | Performed by: PHYSICAL THERAPIST

## 2018-06-06 NOTE — PROGRESS NOTES
Daily Note     Today's date: 2018  Patient name: Quiana Núñez  : 1965  MRN: [id number]  Referring provider: Laurie Richards MD  Dx:   Encounter Diagnosis     ICD-10-CM    1   Secondary lymphedema I89 0                   Subjective: Next week will be my last week      Objective: See treatment diary below    Precautions: anxiety, asthma, CA left breast, depression, HBP     Daily Treatment Diary      Manual  5/10 5/17  5/22  5/31  6/6  4/6  4/11  4/13  4/16  4/29   Manual drainage 13 min  17 min  9 min  x 10min X 12 min  x 10 min  10 min  12 min  12 min  14 min   mobilizations distraction and inferior glide  distraction and inferior glide Grade II  distraction and inferior glide  distraction and inferior glide As last time As before  as last visit  as before As  before As before   PROM left shoulder flexion only Flexion only  15 each  15 flexion recheck others  as ast visit  as last visit  as last visit  flexoin only  flexion only  flexion only    tubigrip size C/D                 forearm C upper arm D                                   ExerciDiary                        Wand flexion supine 5 # x 25  5# x25  5# x 25   5 # x  25  5# x 20  5# x 20  5# x 25  5# x 25  5# x 25  5# x 25   Wand abduction          DC to home              wand extension  5# x 20  5# x 20 5# x20  5# x 25  5# x 25  5# x 20  5# x 20  5# x 20 5# x 20  5# x 20    wand internal rotation  5# x 25  5# x 25 5# x 25  5# x 25  5# x 25  5# x20  5# x 15  5# x 20  5# x 20  5# x 25    wall climbs flexion          dc to home              shoulder flexion  5# x 20  5# x 10  5# x 20  5# x 20  5# x 20  5# x 10  5# x 10   5# x 15  5# x 15 5# x 20    shoulder abduction  3# x 15  3# x 15  3# x 20  3# x 20  3# x 20  3# x 15  3# x 15  3# x 15 3# x 18  3# x 15    door way stretch x5 with 10 sec hold  x5 with 10 sec hold  x 5  x 5  x5  x5  x5  x5  x 5 with 10 sec hold  x5 with 10 sec hold                                                                                                                                                                                                                                                                                                                Assessment: Tolerated treatment well  Patient demonstrated fatigue post treatment      Plan: Continue per plan of care

## 2018-06-08 ENCOUNTER — OFFICE VISIT (OUTPATIENT)
Dept: PHYSICAL THERAPY | Facility: REHABILITATION | Age: 53
End: 2018-06-08
Payer: COMMERCIAL

## 2018-06-08 DIAGNOSIS — I89.0 SECONDARY LYMPHEDEMA: Primary | ICD-10-CM

## 2018-06-08 PROCEDURE — 97140 MANUAL THERAPY 1/> REGIONS: CPT | Performed by: PHYSICAL THERAPIST

## 2018-06-08 PROCEDURE — 97110 THERAPEUTIC EXERCISES: CPT | Performed by: PHYSICAL THERAPIST

## 2018-06-08 NOTE — PROGRESS NOTES
Daily Note     Today's date: 2018  Patient name: Corrina Schroeder  : 1965  MRN: [id number]  Referring provider: Toshia Weldon MD  Dx:   Encounter Diagnosis     ICD-10-CM    1   Secondary lymphedema I89 0                   Subjective: No new complaints      Objective: See treatment diary below    Precautions: anxiety, asthma, CA left breast, depression, HBP     Daily Treatment Diary      Manual  5/10 5/17  5/22  5/31  6/6  6/8  4/11  4/13  4/16  4/29   Manual drainage 13 min  17 min  9 min  x 10min X 12 min  x 15 min  10 min  12 min  12 min  14 min   mobilizations distraction and inferior glide  distraction and inferior glide Grade II  distraction and inferior glide  distraction and inferior glide As last time As before  as last visit  as before As  before As before   PROM left shoulder flexion only Flexion only  15 each  15 flexion recheck others  as ast visit  as last visit  as last visit  flexoin only  flexion only  flexion only    tubigrip size C/D                 forearm C upper arm D                                   ExerciDiary                        Wand flexion supine 5 # x 25  5# x25  5# x 25   5 # x  25  5# x 25  5# x 25  5# x 25  5# x 25  5# x 25  5# x 25                           wand extension  5# x 20  5# x 20 5# x20  5# x 25  5# x 25  5# x 25  5# x 20  5# x 20 5# x 20  5# x 20    wand internal rotation  5# x 25  5# x 25 5# x 25  5# x 25  5# x 25  5# x25  5# x 15  5# x 20  5# x 20  5# x 25                           shoulder flexion  5# x 20  5# x 10  5# x 20  5# x 20  5# x 20  5# x 20  5# x 10   5# x 15  5# x 15 5# x 20    shoulder abduction  3# x 15  3# x 15  3# x 20  3# x 20  3# x 20  3# x 20  3# x 15  3# x 15 3# x 18  3# x 15    door way stretch x5 with 10 sec hold  x5 with 10 sec hold  x 5  x 5  x5  x5  x5  x5  x 5 with 10 sec hold  x5 with 10 sec hold                                                                                                                                                                                                                                                                                                       Left shoulder swelling details:   14 cm above olecranon 31 cm  7 cm above 30 5 cm  Olecranon 27 2 cm  14 cm above ulnar styloid 26 2 cm  7 cm above 20 6 cm  Ulnar styloid 16 4 cm  mcp 19 4 cm  Right shoulder swelling details:   14 cm above olecranon 29 cm  7 cm above 27 cm  Olecranon 25 cm  14 cm above ulnar styloid 23 6 cm  7 cm above 18 8 cm  Ulnar styloid 15 6 cm  mcp 18 8   Exercise supervised by Megan Valverde PTA from 11:02 to 11:10 then manual performed      Assessment: Tolerated treatment well  Patient demonstrated fatigue post treatment  Discussed proper hydration with the humidity because measurements were up a bit today      Plan: Continue per plan of care

## 2018-06-11 ENCOUNTER — OFFICE VISIT (OUTPATIENT)
Dept: PHYSICAL THERAPY | Facility: REHABILITATION | Age: 53
End: 2018-06-11
Payer: COMMERCIAL

## 2018-06-11 DIAGNOSIS — I89.0 SECONDARY LYMPHEDEMA: Primary | ICD-10-CM

## 2018-06-11 PROCEDURE — 97140 MANUAL THERAPY 1/> REGIONS: CPT | Performed by: PHYSICAL THERAPIST

## 2018-06-11 PROCEDURE — 97110 THERAPEUTIC EXERCISES: CPT | Performed by: PHYSICAL THERAPIST

## 2018-06-11 NOTE — PROGRESS NOTES
Daily Note     Today's date: 2018  Patient name: Cyrus Reyes  : 1965  MRN: [id number]  Referring provider: Mandi Ruiz MD  Dx:   Encounter Diagnosis     ICD-10-CM    1   Secondary lymphedema I89 0                   Subjective: I feel like I need a pill for energy    Objective: See treatment diary below    Precautions: anxiety, asthma, CA left breast, depression, HBP     Daily Treatment Diary      Manual  5/10 5/17  5/22  5/31  6/6  6/8  6/11  4/13  4/16  4/29   Manual drainage 13 min  17 min  9 min  x 10min X 12 min  x 15 min  10 min  12 min  12 min  14 min   mobilizations distraction and inferior glide  distraction and inferior glide Grade II  distraction and inferior glide  distraction and inferior glide As last time As before  as last visit  as before As  before As before   PROM left shoulder flexion only Flexion only  15 each  15 flexion recheck others  as ast visit  as last visit  as last visit  flexoin only  flexion only  flexion only    tubigrip size C/D                 forearm C upper arm D                                   ExerciDiary                        Wand flexion supine 5 # x 25  5# x25  5# x 25   5 # x  25  5# x 25  5# x 25  5# x 30  5# x 25  5# x 25  5# x 25                           wand extension  5# x 20  5# x 20 5# x20  5# x 25  5# x 25  5# x 25  5# x 25  5# x 20 5# x 20  5# x 20    wand internal rotation  5# x 25  5# x 25 5# x 25  5# x 25  5# x 25  5# x25  5# x 25  5# x 20  5# x 20  5# x 25                           shoulder flexion  5# x 20  5# x 10  5# x 20  5# x 20  5# x 20  5# x 20  5# x 20   5# x 15  5# x 15 5# x 20    shoulder abduction  3# x 15  3# x 15  3# x 20  3# x 20  3# x 20  3# x 20  3# x 20  3# x 15 3# x 18  3# x 15    door way stretch x5 with 10 sec hold  x5 with 10 sec hold  x 5  x 5  x5  x5  x5  x5  x 5 with 10 sec hold  x5 with 10 sec hold                                                                                                                                                                                                                                                                                                       Left shoulder swelling details:   14 cm above olecranon 31 cm  7 cm above 30 5 cm  Olecranon 27 2 cm  14 cm above ulnar styloid 26 2 cm  7 cm above 20 6 cm  Ulnar styloid 16 4 cm  mcp 19 4 cm  Right shoulder swelling details:   14 cm above olecranon 29 cm  7 cm above 27 cm  Olecranon 25 cm  14 cm above ulnar styloid 23 6 cm  7 cm above 18 8 cm  Ulnar styloid 15 6 cm  mcp 18 8         Assessment: Tolerated treatment well  Patient demonstrated fatigue post treatment  Plan: Continue per plan of care

## 2018-06-14 ENCOUNTER — OFFICE VISIT (OUTPATIENT)
Dept: PHYSICAL THERAPY | Facility: REHABILITATION | Age: 53
End: 2018-06-14
Payer: COMMERCIAL

## 2018-06-14 DIAGNOSIS — I89.0 SECONDARY LYMPHEDEMA: Primary | ICD-10-CM

## 2018-06-14 PROBLEM — D50.9 IRON DEFICIENCY ANEMIA: Status: ACTIVE | Noted: 2017-09-25

## 2018-06-14 PROBLEM — R87.810 CERVICAL HIGH RISK HPV (HUMAN PAPILLOMAVIRUS) TEST POSITIVE: Status: ACTIVE | Noted: 2017-09-21

## 2018-06-14 PROBLEM — E87.6 HYPOKALEMIA: Status: ACTIVE | Noted: 2018-02-26

## 2018-06-14 PROBLEM — K59.00 CONSTIPATION: Status: ACTIVE | Noted: 2018-02-26

## 2018-06-14 PROCEDURE — 97110 THERAPEUTIC EXERCISES: CPT | Performed by: PHYSICAL THERAPIST

## 2018-06-14 PROCEDURE — 97140 MANUAL THERAPY 1/> REGIONS: CPT | Performed by: PHYSICAL THERAPIST

## 2018-06-14 PROCEDURE — G8986 CARRY D/C STATUS: HCPCS | Performed by: PHYSICAL THERAPIST

## 2018-06-14 PROCEDURE — G8985 CARRY GOAL STATUS: HCPCS | Performed by: PHYSICAL THERAPIST

## 2018-06-15 ENCOUNTER — OFFICE VISIT (OUTPATIENT)
Dept: SURGICAL ONCOLOGY | Facility: CLINIC | Age: 53
End: 2018-06-15
Payer: COMMERCIAL

## 2018-06-15 VITALS
BODY MASS INDEX: 25.4 KG/M2 | RESPIRATION RATE: 16 BRPM | WEIGHT: 121 LBS | HEIGHT: 58 IN | DIASTOLIC BLOOD PRESSURE: 80 MMHG | SYSTOLIC BLOOD PRESSURE: 126 MMHG | TEMPERATURE: 98 F | HEART RATE: 114 BPM

## 2018-06-15 DIAGNOSIS — C50.912: Primary | ICD-10-CM

## 2018-06-15 PROCEDURE — 99214 OFFICE O/P EST MOD 30 MIN: CPT | Performed by: SURGERY

## 2018-06-15 NOTE — LETTER
Rylee 15, 2018     Jimmie Hudson MD  [facility]    Patient: Kinsey Vargas   YOB: 1965   Date of Visit: 6/15/2018       Dear Dr Annalisa Yang: Thank you for referring Kinsey Vargas to me for evaluation  Below are my notes for this consultation  If you have questions, please do not hesitate to call me  I look forward to following your patient along with you  Sincerely,        Gissel Garcia MD        CC: MD Beltran Joyce PA-C Maryln Roch, MD Arizona Hansen, MD  6/15/2018 10:42 AM  Sign at close encounter     Surgical Oncology Follow Up       [address]  1965  [phone]  [facility]  [facility] [address]  [birth year]  [phone]    Chief Complaint   Patient presents with    Breast Cancer     Patient is here for a 6 month follow up       Assessment/Plan:    No problem-specific Assessment & Plan notes found for this encounter  Diagnoses and all orders for this visit:    Malignant neoplasm of left breast 5 cm or more at [facility])        Advance Care Planning/Advance Directives:  Discussed disease status, cancer treatment plans and/or cancer treatment goals with the patient  No history exists  History of Present Illness: Diagnosis and Staging: Initial T4N1M0 left breast cancer            Treatment History: Neoadjuvant chemotherapy followed by left modified radical mastectomy October 2016         Current Therapy: tamoxifen      Interval History: Mrs Guille Kennedy is a 81-year-old woman here for surveillance for her breast cancer history  She had a mammogram done recently in anticipation of today's visit  She is tolerating tamoxifen without major issues except for occasional hot flashes      Review of Systems:  Review of Systems Constitutional: Negative  HENT: Negative  Eyes: Negative  Respiratory: Negative  Cardiovascular: Negative  Gastrointestinal: Negative  Endocrine: Negative  Genitourinary: Negative  Musculoskeletal: Negative  Allergic/Immunologic: Negative  Neurological: Negative  Hematological: Negative  Psychiatric/Behavioral: Negative  All other systems reviewed and are negative        Patient Active Problem List   Diagnosis    Breast cancer stage T3, greater than 5 cm in greatest dimension (HCC)    Essential hypertension    Breast cancer (HCC)    Asthma    Anxiety    Insomnia    Pneumonia    Moderate protein-calorie malnutrition (HCC)    Anemia    Benign essential hypertension    Cervical high risk HPV (human papillomavirus) test positive    Constipation    Dyslipidemia    Hypokalemia    Iron deficiency anemia    Lymphedema, limb    Major depressive disorder with single episode, in partial remission ([facility] )    Malignant neoplasm involving both nipple and areola of left breast in female [facility])    Malignant neoplasm of overlapping sites of left female breast [facility])     Past Medical History:   Diagnosis Date    Anxiety     Asthma     Breast cancer ([facility] )     left breast chemo only     Hypertension     Insomnia      Past Surgical History:   Procedure Laterality Date     SECTION      MUSCLE FLAP Left 10/25/2016    Procedure: BREAST WOUND CLOSURE WITH LOCAL FLAP ;  Surgeon: Gracy Monk MD;  [facility];  Service:     RI INSJ TUNNELED CTR VAD W/SUBQ PORT AGE 5 YR/> Right 2016    Procedure: VENOUS PORT PLACEMENT ;  Surgeon: Marybel Concepcion MD;  [facility];  Service: Surgical Oncology    RI MASTECTOMY, MODIFIED RADICAL Left 10/25/2016    Procedure: MODIFIED RADICAL MASTECTOMY;  Surgeon: Marybel Concepcion MD;  [facility];  Service: Surgical Oncology     Family History   Problem Relation Age of Onset    Hypertension Mother    Sharmin Keller Hyperlipidemia Mother     Diabetes Mother     Heart disease Mother     Prostate cancer Father     Hypertension Father     Heart disease Brother      Social History     Social History    Marital status: Single     Spouse name: N/A    Number of children: N/A    Years of education: N/A     Occupational History    Not on file  Social History Main Topics    Smoking status: Never Smoker    Smokeless tobacco: Never Used    Alcohol use No    Drug use: No    Sexual activity: Not on file     Other Topics Concern    Not on file     Social History Narrative    No narrative on file       Current Outpatient Prescriptions:     amLODIPine (NORVASC) 5 mg tablet, Take 5 mg by mouth daily, Disp: , Rfl:     lisinopril-hydrochlorothiazide (PRINZIDE,ZESTORETIC) 20-12 5 MG per tablet, Take 1 tablet by mouth daily in the early morning, Disp: , Rfl:     metoprolol tartrate (LOPRESSOR) 50 mg tablet, Take 50 mg by mouth daily  , Disp: , Rfl:     tamoxifen (NOLVADEX) 20 mg tablet, Take 20 mg by mouth daily, Disp: , Rfl:     venlafaxine (EFFEXOR) 37 5 mg tablet, Take 37 5 mg by mouth daily, Disp: , Rfl:     zolpidem (AMBIEN CR) 6 25 MG CR tablet, Take 10 mg by mouth daily at bedtime as needed for sleep  , Disp: , Rfl:     albuterol (PROVENTIL HFA,VENTOLIN HFA) 90 mcg/act inhaler, Inhale 2 puffs every 4 (four) hours as needed for wheezing, Disp: 1 Inhaler, Rfl: 0  No Known Allergies  Vitals:    06/15/18 1022   BP: 126/80   Pulse: (!) 114   Resp: 16   Temp: 98 °F (36 7 °C)       Physical Exam   Constitutional: She appears well-developed and well-nourished  HENT:   Head: Normocephalic and atraumatic  Right Ear: External ear normal    Left Ear: External ear normal    Eyes: Conjunctivae are normal  Pupils are equal, round, and reactive to light  Neck: Normal range of motion  Neck supple  Cardiovascular: Normal rate, normal heart sounds and intact distal pulses      Pulmonary/Chest: Effort normal and breath sounds normal    Abdominal: Soft  Bowel sounds are normal    Musculoskeletal: Normal range of motion  Neurological: She is alert  Skin: Skin is warm and dry  Well-healed left mastectomy site, left side, no evidence recurrence visible or palpable in mastectomy flaps or axilla  Right breast normal to inspection and palpation  Results:  Labs:  none    Imaging  Mammo Diagnostic Right W 3d & Cad    Result Date: 5/18/2018  Narrative: Patient History: Patient has history of cancer in the left breast at age 49 and has history of cancer in the left breast at age 53  Malignant mastectomy of the left breast, October 2016  Malignant core biopsy of the left breast, March 23, 2016  Reason for exam: history of breast cancer, mastectomy  History of left-sided breast cancer  Mammo Diagnostic Right W DBT and CAD: May 18, 2018 - Check In #: [de-identified] 2D/3D Procedure 3D views: MLO view(s) were taken of the right breast  2D views: CC, MLO, and XCCL view(s) were taken of the right breast  Technologist: ROSMERY Arias (ROSMERY)(M) Prior study comparison: May 9, 2017, mammo diagnostic right W CAD performed at [address] [place] [address]  May 9, 2017, US breast right limited performed at [address] [place] [address]  There are scattered fibroglandular densities  Unilateral right breast mammography performed  The patient is status post left mastectomy  There are no suspicious mass lesions, areas of architectural distortion, or pleomorphic calcifications throughout the breast to suggest malignancy  An ultrasound confirmed cyst at the 6:00 location of the breast is stable  IMPRESSION:No evidence for malignancy  Left mastectomy  ACR BI-RADS® Assessments: BiRad:2 - Benign Recommendation: Follow-up diagnostic mammogram of the right breast in 1 year  The patient is scheduled in a reminder system for screening mammography  8-10% of cancers will be missed on mammography   Management of a palpable abnormality must be based on clinical grounds  Patients will be notified of their results via letter from our facility  Accredited by [employer] of [employer] and FDA  Transcription Location: [address] [place] [address]): [employer] Table: 26 3%, MRS : Based on personal and/or family history, consideration of hereditary risk assessment may be warranted  I reviewed the above laboratory and imaging data  Discussion/Summary:  History of T4N1M0 breast cancer, presently no evidence of disease recurrence  Plan on 6 month follow-up for surveillance per guidelines  We will make referral to Dr Adalberto Ness for continued breast reconstruction

## 2018-06-15 NOTE — PROGRESS NOTES
Surgical Oncology Follow Up       [facility] [place]  [address] [facility] [address]    [place] [facility]  1965  [id number]  [facility] [place]  [place] [phone]    Chief Complaint   Patient presents with    Breast Cancer     Patient is here for a 6 month follow up       Assessment/Plan:    No problem-specific Assessment & Plan notes found for this encounter  Diagnoses and all orders for this visit:    Malign[facility])        Advance Care Planning/Advance Directives:  Discussed disease status, cancer treatment plans and/or cancer treatment goals with the patient  No history exists  History of Present Illness: Diagnosis and Staging: Initial T4N1M0 left breast cancer            Treatment History: Neoadjuvant chemotherapy followed by left modified radical mastectomy October 2016         Current Therapy: tamoxifen      Interval History: Mrs Joycelyn Barnes is a 52-year-old woman here for surveillance for her breast cancer history  She had a mammogram done recently in anticipation of today's visit  She is tolerating tamoxifen without major issues except for occasional hot flashes  Review of Systems:  Review of Systems   Constitutional: Negative  HENT: Negative  Eyes: Negative  Respiratory: Negative  Cardiovascular: Negative  Gastrointestinal: Negative  Endocrine: Negative  Genitourinary: Negative  Musculoskeletal: Negative  Allergic/Immunologic: Negative  Neurological: Negative  Hematological: Negative  Psychiatric/Behavioral: Negative  All other systems reviewed and are negative        Patient Active Problem List   Diagnosis    Breast cancer stage T3, greater than 5 cm in greatest dimension (HCC)    Essential hypertension    Breast cancer (HCC)    Asthma    Anxiety    Insomnia    Pneumonia    Moderate protein-calorie malnutrition (HCC)    Anemia    Benign essential hypertension    Cervical high risk HPV (human papillomavirus) test positive    Constipation    Dyslipidemia    Hypokalemia    Iron deficiency anemia    Lymphedema, limb    Major depressive disorder with single episode, in partial [facility] )    Malignant neoplasm involving both nipple and areola of left breast in female [facility])    Malignant neoplasm of overlapping sites of left female breast [facility])     Past Medical History:   Diagnosis Date    Anxiety     Asthma     [facility]     left breast chemo only     Hypertension     Insomnia      Past Surgical History:   Procedure Laterality Date     SECTION      MUSCLE FLAP Left 10/25/2016    Procedure: BREAST WOUND CLOSURE WITH LOCAL FLAP ;  Surgeon: Chucho Woodson MD;  [facility];  Service:     ID INSJ TUNNELED CTR VAD W/SUBQ PORT AGE 5 YR/> Right 2016    Procedure: VENOUS PORT PLACEMENT ;  Surgeon: Kelly Everett MD;  [facility];  Service: Surgical Oncology    ID MASTECTOMY, MODIFIED RADICAL Left 10/25/2016    Procedure: MODIFIED RADICAL MASTECTOMY;  Surgeon: Kelly Everett MD;  [facility];  Service: Surgical Oncology     Family History   Problem Relation Age of Onset    Hypertension Mother     Hyperlipidemia Mother     Diabetes Mother     Heart disease Mother     Prostate cancer Father     Hypertension Father     Heart disease Brother      Social History     Social History    Marital status: Single     Spouse name: N/A    Number of children: N/A    Years of education: N/A     Occupational History    Not on file       Social History Main Topics    Smoking status: Never Smoker    Smokeless tobacco: Never Used    Alcohol use No    Drug use: No    Sexual activity: Not on file     Other Topics Concern    Not on file     Social History Narrative    No narrative on file       Current Outpatient Prescriptions:     amLODIPine (NORVASC) 5 mg tablet, Take 5 mg by mouth daily, Disp: , Rfl:     lisinopril-hydrochlorothiazide (PRINZIDE,ZESTORETIC) 20-12 5 MG per tablet, Take 1 tablet by mouth daily in the early morning, Disp: , Rfl:     metoprolol tartrate (LOPRESSOR) 50 mg tablet, Take 50 mg by mouth daily  , Disp: , Rfl:     tamoxifen (NOLVADEX) 20 mg tablet, Take 20 mg by mouth daily, Disp: , Rfl:     venlafaxine (EFFEXOR) 37 5 mg tablet, Take 37 5 mg by mouth daily, Disp: , Rfl:     zolpidem (AMBIEN CR) 6 25 MG CR tablet, Take 10 mg by mouth daily at bedtime as needed for sleep  , Disp: , Rfl:     albuterol (PROVENTIL HFA,VENTOLIN HFA) 90 mcg/act inhaler, Inhale 2 puffs every 4 (four) hours as needed for wheezing, Disp: 1 Inhaler, Rfl: 0  No Known Allergies  Vitals:    06/15/18 1022   BP: 126/80   Pulse: (!) 114   Resp: 16   Temp: 98 °F (36 7 °C)       Physical Exam   Constitutional: She appears well-developed and well-nourished  HENT:   Head: Normocephalic and atraumatic  Right Ear: External ear normal    Left Ear: External ear normal    Eyes: Conjunctivae are normal  Pupils are equal, round, and reactive to light  Neck: Normal range of motion  Neck supple  Cardiovascular: Normal rate, normal heart sounds and intact distal pulses  Pulmonary/Chest: Effort normal and breath sounds normal    Abdominal: Soft  Bowel sounds are normal    Musculoskeletal: Normal range of motion  Neurological: She is alert  Skin: Skin is warm and dry  Well-healed left mastectomy site, left side, no evidence recurrence visible or palpable in mastectomy flaps or axilla  Right breast normal to inspection and palpation  Results:  Labs:  none    Imaging  Mammo Diagnostic Right W 3d & Cad    Result Date: 5/18/2018  Narrative: Patient History: Patient has history of cancer in the left breast at age 49 and has history of cancer in the left breast at age 53   Malignant mastectomy of the left breast, October 2016  Malignant core biopsy of the left breast, March 23, 2016  Reason for exam: history of breast cancer, mastectomy  History of left-sided breast cancer  Mammo Diagnostic Right W DBT and CAD: May 18, 2018 - Check In #: [de-identified] 2D/3D Procedure 3D views: MLO view(s) were taken of the right breast  2D views: CC, MLO, and XCCL view(s) were taken of the right breast  Technologist: ROSMERY Jimenez (R)(M) Prior study comparison: May 9, 2017, mammo diagnostic right W CAD performed at [address] [place] [address]  May 9, 2017, US breast right limited performed at [address] [place] [address]  There are scattered fibroglandular densities  Unilateral right breast mammography performed  The patient is status post left mastectomy  There are no suspicious mass lesions, areas of architectural distortion, or pleomorphic calcifications throughout the breast to suggest malignancy  An ultrasound confirmed cyst at the 6:00 location of the breast is stable  IMPRESSION:No evidence for malignancy  Left mastectomy  ACR BI-RADS® Assessments: BiRad:2 - Benign Recommendation: Follow-up diagnostic mammogram of the right breast in 1 year  The patient is scheduled in a reminder system for screening mammography  8-10% of cancers will be missed on mammography  Management of a palpable abnormality must be based on clinical grounds  Patients will be notified of their results via letter from our facility  Accredited by [employer] of Rad[employer] and FDA  Transcription Location: [address] [place] [address]): [employer] Table: 26 3%, MRS : Based on personal and/or family history, consideration of hereditary risk assessment may be warranted  I reviewed the above laboratory and imaging data  Discussion/Summary:  History of T4N1M0 breast cancer, presently no evidence of disease recurrence  Plan on 6 month follow-up for surveillance per guidelines    We will make referral to Dr Armaan Pollock for continued breast reconstruction

## 2018-09-10 ENCOUNTER — HOSPITAL ENCOUNTER (EMERGENCY)
Facility: HOSPITAL | Age: 53
Discharge: HOME | End: 2018-09-10
Attending: EMERGENCY MEDICINE
Payer: COMMERCIAL

## 2018-09-10 ENCOUNTER — APPOINTMENT (EMERGENCY)
Dept: CT IMAGING | Facility: HOSPITAL | Age: 53
End: 2018-09-10
Payer: COMMERCIAL

## 2018-09-10 VITALS
WEIGHT: 124.56 LBS | OXYGEN SATURATION: 100 % | RESPIRATION RATE: 16 BRPM | BODY MASS INDEX: 26.03 KG/M2 | TEMPERATURE: 97.5 F | SYSTOLIC BLOOD PRESSURE: 130 MMHG | HEART RATE: 81 BPM | DIASTOLIC BLOOD PRESSURE: 66 MMHG

## 2018-09-10 DIAGNOSIS — F41.9 ANXIETY: Primary | ICD-10-CM

## 2018-09-10 DIAGNOSIS — R10.9 ABDOMINAL PAIN: ICD-10-CM

## 2018-09-10 LAB
ALBUMIN SERPL BCP-MCNC: 4.3 G/DL (ref 3.5–5)
ALP SERPL-CCNC: 67 U/L (ref 46–116)
ALT SERPL W P-5'-P-CCNC: 28 U/L (ref 12–78)
ANION GAP SERPL CALCULATED.3IONS-SCNC: 14 MMOL/L (ref 4–13)
AST SERPL W P-5'-P-CCNC: 33 U/L (ref 5–45)
BASOPHILS # BLD AUTO: 0.02 THOUSANDS/ΜL (ref 0–0.1)
BASOPHILS NFR BLD AUTO: 0 % (ref 0–1)
BILIRUB SERPL-MCNC: 0.85 MG/DL (ref 0.2–1)
BUN SERPL-MCNC: 16 MG/DL (ref 5–25)
CALCIUM SERPL-MCNC: 9.7 MG/DL (ref 8.3–10.1)
CHLORIDE SERPL-SCNC: 93 MMOL/L (ref 100–108)
CO2 SERPL-SCNC: 23 MMOL/L (ref 21–32)
CREAT SERPL-MCNC: 0.86 MG/DL (ref 0.6–1.3)
EOSINOPHIL # BLD AUTO: 0.09 THOUSAND/ΜL (ref 0–0.61)
EOSINOPHIL NFR BLD AUTO: 1 % (ref 0–6)
ERYTHROCYTE [DISTWIDTH] IN BLOOD BY AUTOMATED COUNT: 13 % (ref 11.6–15.1)
GFR SERPL CREATININE-BSD FRML MDRD: 78 ML/MIN/1.73SQ M
GLUCOSE SERPL-MCNC: 127 MG/DL (ref 65–140)
HCT VFR BLD AUTO: 34.8 % (ref 34.8–46.1)
HGB BLD-MCNC: 11.9 G/DL (ref 11.5–15.4)
IMM GRANULOCYTES # BLD AUTO: 0.02 THOUSAND/UL (ref 0–0.2)
IMM GRANULOCYTES NFR BLD AUTO: 0 % (ref 0–2)
LIPASE SERPL-CCNC: 142 U/L (ref 73–393)
LYMPHOCYTES # BLD AUTO: 1.57 THOUSANDS/ΜL (ref 0.6–4.47)
LYMPHOCYTES NFR BLD AUTO: 23 % (ref 14–44)
MCH RBC QN AUTO: 28.3 PG (ref 26.8–34.3)
MCHC RBC AUTO-ENTMCNC: 34.2 G/DL (ref 31.4–37.4)
MCV RBC AUTO: 83 FL (ref 82–98)
MONOCYTES # BLD AUTO: 0.43 THOUSAND/ΜL (ref 0.17–1.22)
MONOCYTES NFR BLD AUTO: 6 % (ref 4–12)
NEUTROPHILS # BLD AUTO: 4.57 THOUSANDS/ΜL (ref 1.85–7.62)
NEUTS SEG NFR BLD AUTO: 70 % (ref 43–75)
NRBC BLD AUTO-RTO: 0 /100 WBCS
PLATELET # BLD AUTO: 226 THOUSANDS/UL (ref 149–390)
PMV BLD AUTO: 10.9 FL (ref 8.9–12.7)
POTASSIUM SERPL-SCNC: 3.8 MMOL/L (ref 3.5–5.3)
PROT SERPL-MCNC: 8.7 G/DL (ref 6.4–8.2)
RBC # BLD AUTO: 4.2 MILLION/UL (ref 3.81–5.12)
SODIUM SERPL-SCNC: 130 MMOL/L (ref 136–145)
TROPONIN I SERPL-MCNC: <0.02 NG/ML
WBC # BLD AUTO: 6.7 THOUSAND/UL (ref 4.31–10.16)

## 2018-09-10 PROCEDURE — 83690 ASSAY OF LIPASE: CPT | Performed by: EMERGENCY MEDICINE

## 2018-09-10 PROCEDURE — 93005 ELECTROCARDIOGRAM TRACING: CPT

## 2018-09-10 PROCEDURE — 96374 THER/PROPH/DIAG INJ IV PUSH: CPT

## 2018-09-10 PROCEDURE — 84484 ASSAY OF TROPONIN QUANT: CPT | Performed by: EMERGENCY MEDICINE

## 2018-09-10 PROCEDURE — 80053 COMPREHEN METABOLIC PANEL: CPT | Performed by: EMERGENCY MEDICINE

## 2018-09-10 PROCEDURE — 99285 EMERGENCY DEPT VISIT HI MDM: CPT

## 2018-09-10 PROCEDURE — 74177 CT ABD & PELVIS W/CONTRAST: CPT

## 2018-09-10 PROCEDURE — 96375 TX/PRO/DX INJ NEW DRUG ADDON: CPT

## 2018-09-10 PROCEDURE — 96361 HYDRATE IV INFUSION ADD-ON: CPT

## 2018-09-10 PROCEDURE — 36415 COLL VENOUS BLD VENIPUNCTURE: CPT | Performed by: EMERGENCY MEDICINE

## 2018-09-10 PROCEDURE — 85025 COMPLETE CBC W/AUTO DIFF WBC: CPT | Performed by: EMERGENCY MEDICINE

## 2018-09-10 RX ORDER — KETOROLAC TROMETHAMINE 30 MG/ML
15 INJECTION, SOLUTION INTRAMUSCULAR; INTRAVENOUS ONCE
Status: COMPLETED | OUTPATIENT
Start: 2018-09-10 | End: 2018-09-10

## 2018-09-10 RX ORDER — KETOROLAC TROMETHAMINE 30 MG/ML
15 INJECTION, SOLUTION INTRAMUSCULAR; INTRAVENOUS ONCE
Status: DISCONTINUED | OUTPATIENT
Start: 2018-09-10 | End: 2018-09-10

## 2018-09-10 RX ORDER — ESCITALOPRAM OXALATE 10 MG/1
10 TABLET ORAL DAILY
Status: ON HOLD | COMMUNITY
End: 2019-09-05 | Stop reason: ALTCHOICE

## 2018-09-10 RX ORDER — LORAZEPAM 1 MG/1
0.5 TABLET ORAL 2 TIMES DAILY PRN
Qty: 10 TABLET | Refills: 0 | Status: SHIPPED | OUTPATIENT
Start: 2018-09-10 | End: 2019-08-26

## 2018-09-10 RX ORDER — LORAZEPAM 2 MG/ML
0.5 INJECTION INTRAMUSCULAR ONCE
Status: COMPLETED | OUTPATIENT
Start: 2018-09-10 | End: 2018-09-10

## 2018-09-10 RX ORDER — BUPROPION HYDROCHLORIDE 150 MG/1
150 TABLET, EXTENDED RELEASE ORAL DAILY
Status: ON HOLD | COMMUNITY
End: 2019-09-05 | Stop reason: ALTCHOICE

## 2018-09-10 RX ORDER — METOCLOPRAMIDE HYDROCHLORIDE 5 MG/ML
10 INJECTION INTRAMUSCULAR; INTRAVENOUS ONCE
Status: COMPLETED | OUTPATIENT
Start: 2018-09-10 | End: 2018-09-10

## 2018-09-10 RX ADMIN — IOHEXOL 100 ML: 350 INJECTION, SOLUTION INTRAVENOUS at 20:06

## 2018-09-10 RX ADMIN — SODIUM CHLORIDE 1000 ML: 0.9 INJECTION, SOLUTION INTRAVENOUS at 19:26

## 2018-09-10 RX ADMIN — LORAZEPAM 0.5 MG: 2 INJECTION INTRAMUSCULAR; INTRAVENOUS at 21:54

## 2018-09-10 RX ADMIN — KETOROLAC TROMETHAMINE 15 MG: 30 INJECTION, SOLUTION INTRAMUSCULAR at 19:31

## 2018-09-10 RX ADMIN — METOCLOPRAMIDE 10 MG: 5 INJECTION, SOLUTION INTRAMUSCULAR; INTRAVENOUS at 19:26

## 2018-09-10 NOTE — ED PROVIDER NOTES
History  Chief Complaint   Patient presents with    Weakness - Generalized     weak, nausea, dry mouth, diarrhea started this morning  denies CP/SOB/fever  states "feels like a knot in my stomach"     HPI     80-year-old female with history of anxiety asthma hypertension presents with chief complaint of weakness  Weakness began a couple days ago  Patient admits to nausea without vomiting  Patient admits to dry mouth  Patient admits the not wanting to eat  Patient stated she had 1 episode of diarrhea nonbloody  Patient admits to abdominal pain  Epigastric in nature  Burning in nature  Radiates to her upper and lower quadrants  Patient states she feels like she has a knot in her stomach  Currently pain is a 3/10 coming going  At its worst an 8/10  Pain radiates all over the abdomen  Patient states she has never had symptoms like this before  Patient admits to lightheadedness  Patient denies fever chills rigors headache neck pain neck stiffness chest pain palpitations shortness of breath cough pleurisy urinary symptoms motor weakness numbness and tingling urinary symptoms  Prior to Admission Medications   Prescriptions Last Dose Informant Patient Reported? Taking? albuterol (PROVENTIL HFA,VENTOLIN HFA) 90 mcg/act inhaler   No Yes   Sig: Inhale 2 puffs every 4 (four) hours as needed for wheezing   amLODIPine (NORVASC) 5 mg tablet   Yes Yes   Sig: Take 5 mg by mouth daily   buPROPion (ZYBAN) 150 MG 12 hr tablet   Yes Yes   Sig: Take 150 mg by mouth daily   escitalopram (LEXAPRO) 10 mg tablet   Yes Yes   Sig: Take 10 mg by mouth daily   lisinopril-hydrochlorothiazide (PRINZIDE,ZESTORETIC) 20-12 5 MG per tablet   Yes Yes   Sig: Take 1 tablet by mouth daily in the early morning   metoprolol tartrate (LOPRESSOR) 50 mg tablet   Yes Yes   Sig: Take 50 mg by mouth daily     zolpidem (AMBIEN CR) 6 25 MG CR tablet   Yes Yes   Sig: Take 10 mg by mouth daily at bedtime as needed for sleep Facility-Administered Medications: None       Past Medical History:   Diagnosis Date    Anxiety     Asthma     Breast cancer ([address] )     left breast chemo only     Hypertension     Insomnia        Past Surgical History:   Procedure Laterality Date     SECTION      MUSCLE FLAP Left 10/25/2016    Procedure: BREAST WOUND CLOSURE WITH LOCAL FLAP ;  Surgeon: Mika Dumont MD;  [facility];  Service:     WV INSJ TUNNELED CTR VAD W/SUBQ PORT AGE 5 YR/> Right 2016    Procedure: VENOUS PORT PLACEMENT ;  Surgeon: Samy Cuba MD;  [facility];  Service: Surgical Oncology    W[place] MASTECTOMY, MODIFIED RADICAL Left 10/25/2016    Procedure: MODIFIED RADICAL MASTECTOMY;  Surgeon: Samy Cuba MD;  [facility];  Service: Surgical Oncology       Family History   Problem Relation Age of Onset    Hypertension Mother     Hyperlipidemia Mother     Diabetes Mother     Heart disease Mother     Prostate cancer Father     Hypertension Father     Heart disease Brother      I have reviewed and agree with the history as documented  Social History   Substance Use Topics    Smoking status: Never Smoker    Smokeless tobacco: Never Used    Alcohol use No        Review of Systems   Constitutional: Positive for fatigue  Gastrointestinal: Positive for abdominal pain, diarrhea and nausea  Psychiatric/Behavioral: The patient is nervous/anxious  All other systems reviewed and are negative        Physical Exam  ED Triage Vitals   Temperature Pulse Respirations Blood Pressure SpO2   09/10/18 1736 09/10/18 1736 09/10/18 1736 09/10/18 1736 09/10/18 173   97 5 °F (36 4 °C) 91 20 158/77 100 %      Temp src Heart Rate Source Patient Position - Orthostatic VS BP Location FiO2 (%)   -- 09/10/18 2015 09/10/18 1856 09/10/18 1856 --    Monitor Lying Right arm       Pain Score       09/10/18 1736       4           Orthostatic Vital Signs  Vitals:    09/10/18 1736 09/10/18 1856 09/10/18 2015 09/10/18 2222   BP: 158/77 147/83 134/71 130/66   Pulse: 91 89 99 81   Patient Position - Orthostatic VS:  Lying Standing Lying       Physical Exam   Constitutional: She is oriented to person, place, and time  She appears well-developed and well-nourished  No distress  HENT:   Head: Normocephalic and atraumatic  Right Ear: External ear normal    Left Ear: External ear normal    Nose: Nose normal    Mouth/Throat: Oropharynx is clear and moist  No oropharyngeal exudate  Eyes: Conjunctivae and EOM are normal  Pupils are equal, round, and reactive to light  Right eye exhibits no discharge  Left eye exhibits no discharge  No scleral icterus  Neck: Normal range of motion  Neck supple  No JVD present  No tracheal deviation present  No thyromegaly present  Cardiovascular: Normal rate, regular rhythm, normal heart sounds and intact distal pulses  No murmur heard  Pulmonary/Chest: Effort normal and breath sounds normal  No stridor  No respiratory distress  She has no wheezes  Abdominal: Soft  Bowel sounds are normal  She exhibits no distension and no mass  There is generalized tenderness (mild)  There is no rebound and no guarding  No hernia  Musculoskeletal: Normal range of motion  She exhibits no edema, tenderness or deformity  Lymphadenopathy:     She has no cervical adenopathy  Neurological: She is alert and oriented to person, place, and time  She displays normal reflexes  No cranial nerve deficit or sensory deficit  She exhibits normal muscle tone  She displays a negative Romberg sign  Coordination and gait normal  GCS eye subscore is 4  GCS verbal subscore is 5  GCS motor subscore is 6  Reflex Scores:       Tricep reflexes are 2+ on the right side and 2+ on the left side  Bicep reflexes are 2+ on the right side and 2+ on the left side  Patellar reflexes are 2+ on the right side and 2+ on the left side         Achilles reflexes are 2+ on the right side and 2+ on the left side   Skin: Skin is warm and dry  No rash noted  She is not diaphoretic  No erythema  Psychiatric: Her behavior is normal  Judgment and thought content normal  Her mood appears anxious  Thought content is not paranoid and not delusional  She does not exhibit a depressed mood  She expresses no homicidal and no suicidal ideation  She expresses no suicidal plans and no homicidal plans  Nursing note and vitals reviewed        ED Medications  Medications   sodium chloride 0 9 % bolus 1,000 mL (0 mL Intravenous Stopped 9/10/18 2222)   metoclopramide (REGLAN) injection 10 mg (10 mg Intravenous Given 9/10/18 1926)   ketorolac (TORADOL) injection 15 mg (15 mg Intravenous Given 9/10/18 1931)   iohexol (OMNIPAQUE) 350 MG/ML injection (SINGLE-DOSE) 100 mL (100 mL Intravenous Given 9/10/18 2006)   LORazepam (ATIVAN) 2 mg/mL injection 0 5 mg (0 5 mg Intravenous Given 9/10/18 2154)       Diagnostic Studies  Results Reviewed     Procedure Component Value Units Date/Time    Troponin I [[id number]]  (Normal) Collected:  09/10/18 1925    Lab Status:  Final result Specimen:  Blood from Arm, Right Updated:  09/10/18 1953     Troponin I <0 02 ng/mL     Comprehensive metabolic panel [[id number]]  (Abnormal) Collected:  09/10/18 1925    Lab Status:  Final result Specimen:  Blood from Arm, Right Updated:  09/10/18 1951     Sodium 130 (L) mmol/L      Potassium 3 8 mmol/L      Chloride 93 (L) mmol/L      CO2 23 mmol/L      ANION GAP 14 (H) mmol/L      BUN 16 mg/dL      Creatinine 0 86 mg/dL      Glucose 127 mg/dL      Calcium 9 7 mg/dL      AST 33 U/L      ALT 28 U/L      Alkaline Phosphatase 67 U/L      Total Protein 8 7 (H) g/dL      Albumin 4 3 g/dL      Total Bilirubin 0 85 mg/dL      eGFR 78 ml/min/1 73sq m     Narrative:         National Kidney Disease Education Program recommendations are as follows:  GFR calculation is accurate only with a steady state creatinine  Chronic Kidney disease less than 60 ml/min/1 73 sq  meters  Kidney failure less than 15 ml/min/1 73 sq  meters  Lipase [[id number]]  (Normal) Collected:  09/10/18 1925    Lab Status:  Final result Specimen:  Blood from Arm, Right Updated:  09/10/18 1951     Lipase 142 u/L     CBC and differential [[id number]] Collected:  09/10/18 1925    Lab Status:  Final result Specimen:  Blood from Arm, Right Updated:  09/10/18 1932     WBC 6 70 Thousand/uL      RBC 4 20 Million/uL      Hemoglobin 11 9 g/dL      Hematocrit 34 8 %      MCV 83 fL      MCH 28 3 pg      MCHC 34 2 g/dL      RDW 13 0 %      MPV 10 9 fL      Platelets 579 Thousands/uL      nRBC 0 /100 WBCs      Neutrophils Relative 70 %      Immat GRANS % 0 %      Lymphocytes Relative 23 %      Monocytes Relative 6 %      Eosinophils Relative 1 %      Basophils Relative 0 %      Neutrophils Absolute 4 57 Thousands/µL      Immature Grans Absolute 0 02 Thousand/uL      Lymphocytes Absolute 1 57 Thousands/µL      Monocytes Absolute 0 43 Thousand/µL      Eosinophils Absolute 0 09 Thousand/µL      Basophils Absolute 0 02 Thousands/µL                  CT abdomen pelvis with contrast   Final Result by Ranjan Mccollum MD (09/10 2027)      No acute CT findings  One or more sharply circumscribed subcentimeter renal hypodensities are noted  These lesions are too small to accurately characterize, but are statistically most likely to represent benign cortical renal cyst(s)  According to the guidelines published in    the [facility] Paper of the ACR Incidental Findings Committee (Radiology 2010), no further workup of these lesions is recommended              Workstation performed: [id number]               Procedures  ECG 12 Lead Documentation  Date/Time: 9/10/2018 7:35 PM  Performed by: Rhina Hernandez  Authorized by: Rhina Hernandez     Indications / Diagnosis:  Fatigue  ECG reviewed by me, the ED Provider: yes    Patient location:  ED  Previous ECG:     Previous ECG:  Compared to current    Similarity:  No change  Interpretation:     Interpretation: normal    Rate:     ECG rate:  96    ECG rate assessment: normal    Rhythm:     Rhythm: sinus rhythm    Ectopy:     Ectopy: none    QRS:     QRS axis:  Normal  Conduction:     Conduction: normal    ST segments:     ST segments:  Normal  T waves:     T waves: normal            Phone Consults  ED Phone Contact    ED Course  ED Course as of Sep 11 0953   Mon Sep 10, 2018   1956 Sodium: (!) 130   1956 Potassium: 3 8   1956 Chloride: (!) 93   1956 Anion Gap: (!) 14   1956 Sodium: (!) 130   1956 effexor Sodium: (!) 130   2108 No acute CT findings  One or more sharply circumscribed subcentimeter renal hypodensities are noted  These lesions are too small to accurately characterize, but are statistically most likely to represent benign cortical renal cyst(s)   According to the guidelines published in   the White Paper of the ACR Incidental Findings Committee (Radiology 2010), no further workup of these lesions is recommended  2148 Sodium: (!) 130   2148 Potassium: 3 8   2148 Chloride: (!) 93   2148 Anion Gap: (!) 14   2148 Glucose: 127                               MDM  Number of Diagnoses or Management Options  Abdominal pain:   Anxiety:   Diagnosis management comments: 81-year-old female presenting with chief complaint of abdominal pain and fatigue  On exam vital signs normal   Patient has generalized abdominal tenderness on exam   Laboratory evaluation shows hyponatremia most likely secondary to FX or  Patient is anxious  Patient given Ativan and Reglan in the department  Patient felt better  CT abdomen pelvis no acute intra-abdominal process  Impression abdominal pain anxiety  ED return precautions discussed  Patient agrees to follow-up care      Family serves English to [address]    Disposition  Final diagnoses:   Anxiety   Abdominal pain     Time reflects when diagnosis was documented in both MDM as applicable and the Disposition within this note     Time User Action Codes Description Comment    9/10/2018  9:49 PM Maryuri Hamilton Add [F41 9] Anxiety     9/10/2018  9:50 PM Maryuri Hamilton Add [R10 9] Abdominal pain       ED Disposition     ED Disposition Condition Comment    Discharge  Quiana Wilton discharge to home/self care  Condition at discharge: Good        Follow-up Information     Follow up With Specialties Details Why Contact Info    Aleyda Martinez MD Family Medicine  Please follow-up with your primary care physician in the next 2 to 3 days  If you have new or worsening symptoms please call your doctor or return to the emergency department  [address]  [address]  [address],            Discharge Medication List as of 9/10/2018  9:52 PM      START taking these medications    Details   LORazepam (ATIVAN) 1 mg tablet Take 0 5 tablets (0 5 mg total) by mouth 2 (two) times a day as needed for anxiety for up to 10 doses, Starting Mon 9/10/2018, Print         CONTINUE these medications which have NOT CHANGED    Details   albuterol (PROVENTIL HFA,VENTOLIN HFA) 90 mcg/act inhaler Inhale 2 puffs every 4 (four) hours as needed for wheezing, Starting 1/21/2017, Until Discontinued, Print      amLODIPine (NORVASC) 5 mg tablet Take 5 mg by mouth daily, Until Discontinued, Historical Med      lisinopril-hydrochlorothiazide (PRINZIDE,ZESTORETIC) 20-12 5 MG per tablet Take 1 tablet by mouth daily in the early morning, Until Discontinued, Historical Med      metoprolol tartrate (LOPRESSOR) 50 mg tablet Take 50 mg by mouth daily  , Until Discontinued, Historical Med      zolpidem (AMBIEN CR) 6 25 MG CR tablet Take 10 mg by mouth daily at bedtime as needed for sleep  , Until Discontinued, Historical Med      tamoxifen (NOLVADEX) 20 mg tablet Take 20 mg by mouth daily, Until Discontinued, Historical Med      venlafaxine (EFFEXOR) 37 5 mg tablet Take 37 5 mg by mouth daily, Until Discontinued, Historical Med           No discharge procedures on file     ED Provider  Attending physically available and evaluated Corrina Schroeder I managed the patient along with the ED Attending      Electronically Signed by         Lona Frazier DO  09/11/18 0906

## 2018-09-11 NOTE — ED NOTES
Patient reporting no improvement in nausea  Will notify provider  Patient ambulatory to pateloo        Jonathan Lyman RN  09/10/18 2017

## 2018-09-12 LAB
ATRIAL RATE: 96 BPM
P AXIS: 64 DEGREES
PR INTERVAL: 192 MS
QRS AXIS: -27 DEGREES
QRSD INTERVAL: 102 MS
QT INTERVAL: 364 MS
QTC INTERVAL: 459 MS
T WAVE AXIS: 57 DEGREES
VENTRICULAR RATE: 96 BPM

## 2018-09-12 PROCEDURE — 93010 ELECTROCARDIOGRAM REPORT: CPT | Performed by: INTERNAL MEDICINE

## 2018-09-13 ENCOUNTER — OFFICE VISIT (OUTPATIENT)
Dept: HEMATOLOGY ONCOLOGY | Facility: CLINIC | Age: 53
End: 2018-09-13
Payer: COMMERCIAL

## 2018-09-13 VITALS
HEART RATE: 92 BPM | SYSTOLIC BLOOD PRESSURE: 138 MMHG | OXYGEN SATURATION: 99 % | BODY MASS INDEX: 26.66 KG/M2 | HEIGHT: 58 IN | DIASTOLIC BLOOD PRESSURE: 88 MMHG | RESPIRATION RATE: 16 BRPM | WEIGHT: 127 LBS | TEMPERATURE: 98.7 F

## 2018-09-13 DIAGNOSIS — C50.912 MALIGNANT NEOPLASM OF LEFT FEMALE BREAST, UNSPECIFIED ESTROGEN RECEPTOR STATUS, UNSPECIFIED SITE OF BREAST (HCC): Primary | ICD-10-CM

## 2018-09-13 PROCEDURE — 99214 OFFICE O/P EST MOD 30 MIN: CPT | Performed by: INTERNAL MEDICINE

## 2018-09-13 NOTE — PROGRESS NOTES
Hematology / Oncology Outpatient Follow Up Note    Edelmira Isaacs 47 y o  female :1965 WKW:[id number]         Date:  2018    Assessment / Plan:  A 47year old premenopausal woman with locally advanced left breast cancer, grade 2, ER strongly positive, GA weakly positive, HER-2 3+ disease  She has quite large left breast mass, with no fixation to the chest wall  There is some skin change or the left breast mass, but no skin ulceration  She has no evidence of distant metastasis  She underwent neoadjuvant chemotherapy with dose dense AC , followed by weekly paclitaxel, trastuzumab and pertuzumab resulting in pathologically very good partial response  She had 0 5 mm of residual invasive ductal carcinoma with 18 negative lymph nodes  She completed trastuzumab monotherapy as adjuvant setting  She is currently on adjuvant hormonal therapy with tamoxifen with expected side effect with hot flashes  based on her symptomatology and physical examination, she has no evidence recurrent disease  I recommended her to continue with tamoxifen 20 mg daily  I will see her again in a year for routine follow-up  She is in agreement with my recommendation                           Subjective:      HPI:  A 49year old premenopausal woman who has been aware of left breast mass for 2-3 years, which has been growing  She underwent left breast biopsy in 2015 in [facility], which showed invasive ductal carcinoma, ER positive, HER-2 3+ disease  She was seen by medical oncologist who put her on tamoxifen  She was on tamoxifen from 2015 through 2016 with some shrinkage of left breast mass  After she started tamoxifen, she stopped having menstrual cycle  Prior to this, she had regular menstrual cycle until 2015  She recently came to [place], [place] to be with her daughter   She presented to Dr Yvon Guadarrama office for surgical consultation, where she was found to have quite large left breast mass with some skin change  Dr Lance Perry did metastatic workup  CT scan of chest, abdomen pelvis showed large left breast mass without any evidence of distant metastasis  Bone scan was negative  She also underwent mammography scan which showed ejection fraction 63%  She presents today to discuss neoadjuvant chemotherapy  She has no complaint of pain  However, she feels anxious  Her weight has been stable  She has no respiratory symptom  She has no other significant comorbidities except hypertension  Her performance status is normal             Interval History:  A 81-year-old premenopausal woman with locally advanced left breast cancer, grade 2, ER strongly positive, OH weakly positive, HER-2 3+ disease  She had quite large left breast mass as well as skin involvement  She was previously treated with tamoxifen by oncologist in [email or web address] before she moved to the [facility] area to be with her daughter  She has no evidence of metastatic disease  She completed neoadjuvant chemotherapy with AC , followed by paclitaxel, trastuzumab and pertuzumab with minimal toxicity  She achieved clinical partial response  She underwent left mastectomy with axillary lymph node dissection in October 25, 2016  Pathology showed 0 5 mm of residual invasive carcinoma with 18 negative axillary lymph node  This is consistent with very good pathological response  She completed trastuzumab monotherapy in June 2017 without cardiac toxicity  She is currently on adjuvant hormonal therapy with tamoxifen  She presented today for routine follow-up    she has no menstrual cycle since the neoadjuvant chemotherapy  She continued to have mild-to-moderate hot flashes  She denied any bone pain  She has no respiratory symptoms  Her weight is stable  She has normal performance status                          Objective:      Primary Diagnosis:     Locally advanced left breast cancer, grade 2, ER positive, OH weakly positive, HER-2 3+ disease   Diagnosed in July 2015       Cancer Staging:  No matching staging information was found for the patient         Previous Hematologic/ Oncologic Treatment:      1  Tamoxifen from December 2015 through March 2016 by previous oncologist in [facility]   2  Neoadjuvant chemotherapy with dose dense AC x4 followed by 12 weekly paclitaxel, trastuzumab, and triweekly pertuzumab  Completed in early September 2016  3  Mastectomy with lymph node dissection in October 25, 2016   4  Adjuvant trastuzumab monotherapy until June 2017       Current Hematologic/ Oncologic Treatment:       1  Adjuvant hormonal therapy with tamoxifen since February 2017       Disease Status:      Clinical partial response  Very good pathological response with minimal invasive residual carcinoma  ANGELA status post mastectomy with lymph node dissection       Test Results:     Pathology:     Left breast biopsy showed invasive ductal carcinoma, grade 2  % positive, SC 15-20% positive, HER-2 3+ disease      Final pathology specimen on October 25, 2016 showed 0 5 cm of residual invasive ductal carcinoma with -18 axillary lymph nodes      Radiology:     Mammography on the right in May 2018 was benign  BI-RADS of 2       Laboratory:     See below     Physical Exam:        General Appearance:    Alert, oriented          Eyes:    PERRL   Ears:    Normal external ear canals, both ears   Nose:   Nares normal, septum midline   Throat:   Mucosa moist  Pharynx without injection  Neck:   Supple         Lungs:     Clear to auscultation bilaterally   Chest Wall:    No tenderness or deformity    Heart:    Regular rate and rhythm         Abdomen:     Soft, non-tender, bowel sounds +, no organomegaly               Extremities:   Extremities no cyanosis or edema         Skin:   no rash or icterus      Lymph nodes:   Cervical, supraclavicular, and axillary nodes normal   Neurologic:   CNII-XII intact, normal strength, sensation and reflexes     Throughout             Breast exam:   status post left mastectomy without reconstruction  No palpable abnormality on her left chest wall  Right breast exam is negative  ROS: Review of Systems   Constitutional:        Hot flashes   All other systems reviewed and are negative  Imaging: Ct Abdomen Pelvis With Contrast    Result Date: 9/10/2018  Narrative: CT ABDOMEN AND PELVIS WITH IV CONTRAST INDICATION:   Infection, abdomen-pelvis  Diarrhea, nausea, unspecified diffuse abdominal pain COMPARISON:  None  TECHNIQUE:  CT examination of the abdomen and pelvis was performed  Axial, sagittal, and coronal 2D reformatted images were created from the source data and submitted for interpretation  Radiation dose length product (DLP) for this visit:  186 mGy-cm   This examination, like all CT scans performed in the [facility], was performed utilizing techniques to minimize radiation dose exposure, including the use of iterative reconstruction and automated exposure control  IV Contrast:  100 mL of iohexol (OMNIPAQUE) Enteric Contrast:  Enteric contrast was not administered  FINDINGS: ABDOMEN LOWER CHEST:  No clinically significant abnormality identified in the visualized lower chest  LIVER/BILIARY TREE:  Unremarkable  GALLBLADDER:  No calcified gallstones  No pericholecystic inflammatory change  SPLEEN:  Unremarkable  PANCREAS:  Unremarkable  ADRENAL GLANDS:  Unremarkable  KIDNEYS/URETERS:  One or more sharply circumscribed subcentimeter renal hypodensities are noted  These lesions are too small to accurately characterize, but are statistically most likely to represent benign cortical renal cyst(s)  According to the guidelines published in the [facility] Paper of the ACR Incidental Findings Committee (Radiology 2010), no further workup of these lesions is recommended  STOMACH AND BOWEL:  Unremarkable  APPENDIX:  No findings to suggest appendicitis  ABDOMINOPELVIC CAVITY:  No ascites or free intraperitoneal air   No lymphadenopathy  VESSELS:  Unremarkable for patient's age  PELVIS REPRODUCTIVE ORGANS:  Unremarkable for patient's age  URINARY BLADDER:  Unremarkable  ABDOMINAL WALL/INGUINAL REGIONS:  Unremarkable  OSSEOUS STRUCTURES:  No acute fracture or destructive osseous lesion  Impression: No acute CT findings  One or more sharply circumscribed subcentimeter renal hypodensities are noted  These lesions are too small to accurately characterize, but are statistically most likely to represent benign cortical renal cyst(s)  According to the guidelines published in  the [facility] Paper of the ACR Incidental Findings Committee (Radiology 2010), no further workup of these lesions is recommended  Workstation performed: [id number]         Labs:   Lab Results   Component Value Date    WBC 6 70 09/10/2018    HGB 11 9 09/10/2018    HCT 34 8 09/10/2018    MCV 83 09/10/2018     09/10/2018     Lab Results   Component Value Date     (L) 09/10/2018    K 3 8 09/10/2018    CL 93 (L) 09/10/2018    CO2 23 09/10/2018    BUN 16 09/10/2018    CREATININE 0 86 09/10/2018    GLUF 98 02/22/2018    CALCIUM 9 7 09/10/2018    AST 33 09/10/2018    ALT 28 09/10/2018    ALKPHOS 67 09/10/2018    EGFR 78 09/10/2018         Lab Results   Component Value Date    FERRITIN 42 02/22/2018         Current Medications: Reviewed  Allergies: Reviewed  PMH/FH/SH:  Reviewed      Vital Sign:    Body surface area is 1 5 meters squared      Wt Readings from Last 3 Encounters:   09/13/18 57 6 kg (127 lb)   09/10/18 56 5 kg (124 lb 9 oz)   06/15/18 54 9 kg (121 lb)        Temp Readings from Last 3 Encounters:   09/13/18 98 7 °F (37 1 °C) (Tympanic)   09/10/18 97 5 °F (36 4 °C)   06/15/18 98 °F (36 7 °C)        BP Readings from Last 3 Encounters:   09/13/18 138/88   09/10/18 130/66   06/15/18 126/80         Pulse Readings from Last 3 Encounters:   09/13/18 92   09/10/18 81   06/15/18 (!) 114     @LASTSAO2(3)@

## 2018-09-18 NOTE — ED ATTENDING ATTESTATION
Claudio Mendez MD, saw and evaluated the patient  I have discussed the patient with the resident/non-physician practitioner and agree with the resident's/non-physician practitioner's findings, Plan of Care, and MDM as documented in the resident's/non-physician practitioner's note, except where noted  All available labs and Radiology studies were reviewed  At this point I agree with the current assessment done in the Emergency Department  I have conducted an independent evaluation of this patient a history and physical is as follows:    21-year-old female presents with multiple complaints  She states that she just does not feel right    She has had some generalized weakness over the past several days  She has also had nausea, abdominal discomfort, a single episode of nonbloody diarrhea and decreased appetite  She reports dry mouth and intermittent episodes of anxiety and palpitations  All the symptoms started since the patient was diagnosed with breast cancer and have been intermittent since that time  Current abdominal pain is described as 8/10 in intensity at its worst   Patient describes it as a knot  No fevers or chills  No neck pain or stiffness  Denies urinary symptoms or vaginal discharge  Physical exam:  GCS 15, nonfocal   Regular rate and rhythm, clear to auscultation bilaterally, abdomen is soft and nontender without rebound or guarding  Extremities are nontender without edema  Impression plan:  21-year-old female with multiple complaints  Will order diagnostic tests, treat symptomatically, reassess for disposition      Critical Care Time  CritCare Time    Procedures

## 2018-11-21 DIAGNOSIS — C50.919 MALIGNANT NEOPLASM OF FEMALE BREAST, UNSPECIFIED ESTROGEN RECEPTOR STATUS, UNSPECIFIED LATERALITY, UNSPECIFIED SITE OF BREAST (HCC): Primary | ICD-10-CM

## 2018-11-21 RX ORDER — TAMOXIFEN CITRATE 20 MG/1
20 TABLET ORAL DAILY
Qty: 90 TABLET | Refills: 2 | Status: SHIPPED | OUTPATIENT
Start: 2018-11-21 | End: 2020-01-17 | Stop reason: SDUPTHER

## 2019-01-09 PROBLEM — Z79.810 USE OF TAMOXIFEN (NOLVADEX): Status: ACTIVE | Noted: 2019-01-09

## 2019-01-11 ENCOUNTER — OFFICE VISIT (OUTPATIENT)
Dept: SURGICAL ONCOLOGY | Facility: CLINIC | Age: 54
End: 2019-01-11
Payer: COMMERCIAL

## 2019-01-11 VITALS
SYSTOLIC BLOOD PRESSURE: 150 MMHG | BODY MASS INDEX: 25.19 KG/M2 | WEIGHT: 120 LBS | RESPIRATION RATE: 16 BRPM | TEMPERATURE: 98 F | HEART RATE: 96 BPM | DIASTOLIC BLOOD PRESSURE: 90 MMHG | HEIGHT: 58 IN

## 2019-01-11 DIAGNOSIS — Z90.12 HISTORY OF MASTECTOMY, LEFT: ICD-10-CM

## 2019-01-11 DIAGNOSIS — Z17.0 MALIGNANT NEOPLASM OF OVERLAPPING SITES OF LEFT BREAST IN FEMALE, ESTROGEN RECEPTOR POSITIVE (HCC): Primary | ICD-10-CM

## 2019-01-11 DIAGNOSIS — C50.812 MALIGNANT NEOPLASM OF OVERLAPPING SITES OF LEFT BREAST IN FEMALE, ESTROGEN RECEPTOR POSITIVE (HCC): Primary | ICD-10-CM

## 2019-01-11 DIAGNOSIS — Z79.810 USE OF TAMOXIFEN (NOLVADEX): ICD-10-CM

## 2019-01-11 PROCEDURE — 99214 OFFICE O/P EST MOD 30 MIN: CPT | Performed by: SURGERY

## 2019-01-11 RX ORDER — B-COMPLEX WITH VITAMIN C
1 TABLET ORAL
Status: ON HOLD | COMMUNITY
End: 2019-09-05 | Stop reason: ALTCHOICE

## 2019-01-11 NOTE — LETTER
January 11, 2019     Ion Masters MD  [address] [facility]  [address] [place] [address]    Patient: Twila Alvarenga   YOB: 1965   Date of Visit: 1/11/2019       Dear Dr Tami Campbell: Thank you for referring Twila Alvarenga to me for evaluation  Below are my notes for this consultation  If you have questions, please do not hesitate to call me  I look forward to following your patient along with you  Sincerely,        Alejandra Penaloza MD        CC: MD Lexie Lazaro MD Maurine Andes, PA-C Lavina Kent, MD Dewitt Ice, MD Marthenia Carpenter, MD  1/11/2019 10:12 AM  Sign at close encounter     Surgical Oncology Follow Up       [address]  1965  [id number]  [place] [address]    Chief Complaint   Patient presents with    Follow-up     Patient is here for a 6 month breast cancer follow up  Assessment/Plan:    No problem-specific Assessment & Plan notes found for this encounter  Diagnoses and all orders for this visit:    Malignant neoplasm of overlapping sites of left breast in female, estrogen receptor positive ([facility] [address] )  -     Ambulatory referral to Plastic Surgery; Future  -     Mammo diagnostic right w 3d & cad; Future    Use of tamoxifen (Nolvadex)    Other orders  -     calcium carbonate-vitamin D (OSCAL-D) 500 mg-200 units per tablet; Take 1 tablet by mouth daily with breakfast        Advance Care Planning/Advance Directives:  Discussed disease status, cancer treatment plans and/or cancer treatment goals with the patient          Malignant neoplasm of overlapping sites of left female breast [facility])    12/2015 - 3/2016 Hormone Therapy      Tamoxifen from December 2015 through March 2016 by previous oncologist in [facility]          3/24/2016 Biopsy     Left breast core biopsy    Invasive mammary carcinoma with mucinous differentiation  %  MA 15-20%  HER-2 positive          - 9/2016 Chemotherapy     Neoadjuvant chemotherapy with dose dense AC x4 followed by 12 weekly paclitaxel, trastuzumab, and triweekly pertuzumab  Completed in early September 2016          10/26/2016 Surgery     Modified radical mastectomy (Dr Woody Gaspar)    Two microscopic foci of residual viable microcarcinoma, each measuring 0 5 mm  Eighteen (18) axillary lymph nodes, negative for carcinoma  One (1) lymph node with therapy effect  7th ED AJCC Stage:  at least Stage IA - ypT1mi, ypN0, G2, R1          - 6/2017 Chemotherapy     Adjuvant trastuzumab monotherapy until June 2017           12/15/2016 - 2/1/2017 Radiation     completed a course of adjuvant postmastectomy radiation to the chest wall and draining lymph         2/2017 -  Hormone Therapy     Adjuvant hormonal therapy with tamoxifen since February 2017  History of Present Illness:  History of T4 left breast cancer status post preop chemotherapy, mastectomy and radiation, 2016  Presently on tamoxifen therapy   -Interval History:  Patient is a 55-year-old woman here for surveillance  She has no complaints to report  Her last mammogram was in the summer of 2018  She is doing well and tolerating her treatment with little to no side effect  Review of Systems:  Review of Systems   Constitutional: Negative  HENT: Negative  Eyes: Negative  Respiratory: Negative  Cardiovascular: Negative  Gastrointestinal: Negative  Endocrine: Negative  Genitourinary: Negative  Musculoskeletal: Negative  Skin: Negative  Allergic/Immunologic: Negative  Neurological: Negative  Hematological: Negative  Psychiatric/Behavioral: Negative          Patient Active Problem List   Diagnosis    Essential hypertension    Asthma    Anxiety    Insomnia    Pneumonia    Moderate protein-calorie malnutrition (HCC)    Anemia    Benign essential hypertension    Cervical high risk HPV (human papillomavirus) test positive    Constipation    Dyslipidemia    Hypokalemia    Iron deficiency anemia    Lymphedema, limb    [facility] with single episode[facility]    Malignant neoplasm of overlapping sites of left female breast [facility]    Use of tamoxifen (Nolvadex)     Past Medical History:   Diagnosis Date    Anxiety     Asthma     Breast cancer ([facility] )     left breast chemo only     Hypertension     Insomnia      Past Surgical History:   Procedure Laterality Date     SECTION      MUSCLE FLAP Left 10/25/2016    Procedure: BREAST WOUND CLOSURE WITH LOCAL FLAP ;  Surgeon: Renita Dow MD;  [facility];  Service:     [place] INSJ TUNNELED CTR VAD W/SUBQ PORT AGE 5 YR/> Right 2016    Procedure: VENOUS PORT PLACEMENT ;  Surgeon: Isela Arvizu MD;  [facility];  Service: Surgical Oncology    [place] MASTECTOMY, MODIFIED RADICAL Left 10/25/2016    Procedure: MODIFIED RADICAL MASTECTOMY;  Surgeon: Isela Arvizu MD;  [facility];  Service: Surgical Oncology     Family History   Problem Relation Age of Onset    Hypertension Mother     Hyperlipidemia Mother     Diabetes Mother     Heart disease Mother     Prostate cancer Father     Hypertension Father     Heart disease Brother      Social History     Social History    Marital status: Single     Spouse name: N/A    Number of children: N/A    Years of education: N/A     Occupational History    Not on file       Social History Main Topics    Smoking status: Never Smoker    Smokeless tobacco: Never Used    Alcohol use No    Drug use: No    Sexual activity: Not on file     Other Topics Concern    Not on file     Social History Narrative    No narrative on file       Current Outpatient Prescriptions:     amLODIPine (NORVASC) 5 mg tablet, Take 5 mg by mouth daily, Disp: , Rfl:     buPROPion (ZYBAN) 150 MG 12 hr tablet, Take 150 mg by mouth daily, Disp: , Rfl:     calcium carbonate-vitamin D (OSCAL-D) 500 mg-200 units per tablet, Take 1 tablet by mouth daily with breakfast, Disp: , Rfl:     escitalopram (LEXAPRO) 10 mg tablet, Take 10 mg by mouth daily, Disp: , Rfl:     lisinopril-hydrochlorothiazide (PRINZIDE,ZESTORETIC) 20-12 5 MG per tablet, Take 1 tablet by mouth daily in the early morning, Disp: , Rfl:     metoprolol tartrate (LOPRESSOR) 50 mg tablet, Take 50 mg by mouth daily  , Disp: , Rfl:     tamoxifen (NOLVADEX) 20 mg tablet, Take 1 tablet (20 mg total) by mouth daily, Disp: 90 tablet, Rfl: 2    zolpidem (AMBIEN CR) 6 25 MG CR tablet, Take 10 mg by mouth daily at bedtime as needed for sleep  , Disp: , Rfl:     albuterol (PROVENTIL HFA,VENTOLIN HFA) 90 mcg/act inhaler, Inhale 2 puffs every 4 (four) hours as needed for wheezing (Patient not taking: Reported on 1/11/2019 ), Disp: 1 Inhaler, Rfl: 0    LORazepam (ATIVAN) 1 mg tablet, Take 0 5 tablets (0 5 mg total) by mouth 2 (two) times a day as needed for anxiety for up to 10 doses (Patient not taking: Reported on 1/11/2019 ), Disp: 10 tablet, Rfl: 0  No Known Allergies  Vitals:    01/11/19 0940   BP: 150/90   Pulse: 96   Resp: 16   Temp: 98 °F (36 7 °C)       Physical Exam   Constitutional: She is oriented to person, place, and time  She appears well-developed and well-nourished  HENT:   Head: Normocephalic and atraumatic  Right Ear: External ear normal    Left Ear: External ear normal    Eyes: Pupils are equal, round, and reactive to light  Conjunctivae are normal    Neck: Normal range of motion  Neck supple  Cardiovascular: Normal rate, regular rhythm and normal heart sounds  Pulmonary/Chest: Effort normal and breath sounds normal    Abdominal: Soft  Bowel sounds are normal    Musculoskeletal: Normal range of motion  Lymphadenopathy:     She has no cervical adenopathy  Neurological: She is alert and oriented to person, place, and time  Skin: Skin is warm and dry  Right breast axilla normal to inspection and palpation  Left breast flaps well healed without evidence of recurrence visible or palpable  Negative axillary adenopathy  No cervical or supraclavicular adenopathy  Psychiatric: She has a normal mood and affect  Her behavior is normal  Judgment and thought content normal          Results:  Labs:  CBC, Coags, BMP, Mg, Phos     Imaging  No results found  I reviewed the above laboratory and imaging data  Discussion/Summary:  64-year-old woman with history of locally advanced breast cancer  She is doing well  She will continue to marks and therapy per medical oncology team   Will order mammogram for screening/surveillance in June of this year  Make referral to Plastic surgery team to discuss reconstruction/implant

## 2019-01-11 NOTE — PROGRESS NOTES
Surgical Oncology Follow Up       [facility] SURGICAL ONCOLOGY [place]  [address] [facility] [address]    Radha Louis  1965  [id number]  [facility] ONCOL[facility] [place]  [address] [place] [address]    Chief Complaint   Patient presents with    Follow-up     Patient is here for a 6 month breast cancer follow up  Assessment/Plan:    No problem-specific Assessment & Plan notes found for this encounter  Diagnoses and all orders for this visit:    Malignant neoplasm of overlapping sites of left breast in female, estrogen receptor positive ([facility] [address] )  -     Ambulatory referral to Plastic Surgery; Future  -     Mammo diagnostic right w 3d & cad; Future    Use of tamoxifen (Nolvadex)    Other orders  -     calcium carbonate-vitamin D (OSCAL-D) 500 mg-200 units per tablet; Take 1 tablet by mouth daily with breakfast        Advance Care Planning/Advance Directives:  Discussed disease status, cancer treatment plans and/or cancer treatment goals with the patient  Malignant neoplasm of overlapping sites of left female breast [facility])    12/2015 - 3/2016 Hormone Therapy      Tamoxifen from December 2015 through March 2016 by previous oncologist in [facility]          3/24/2016 Biopsy     Left breast core biopsy    Invasive mammary carcinoma with mucinous differentiation  %  AZ 15-20%  HER-2 positive          - 9/2016 Chemotherapy     Neoadjuvant chemotherapy with dose dense AC x4 followed by 12 weekly paclitaxel, trastuzumab, and triweekly pertuzumab  Completed in early September 2016          10/26/2016 Surgery     Modified radical mastectomy (Dr Brissa Yen)    Two microscopic foci of residual viable microcarcinoma, each measuring 0 5 mm  Eighteen (18) axillary lymph nodes, negative for carcinoma  One (1) lymph node with therapy effect       7th ED AJCC Stage:  at least Stage IA - ypT1mi, ypN0, G2, R1          - 2017 Chemotherapy     Adjuvant trastuzumab monotherapy until 2017           12/15/2016 - 2017 Radiation     completed a course of adjuvant postmastectomy radiation to the chest wall and draining lymph         2017 -  Hormone Therapy     Adjuvant hormonal therapy with tamoxifen since 2017  History of Present Illness:  History of T4 left breast cancer status post preop chemotherapy, mastectomy and radiation, 2016  Presently on tamoxifen therapy   -Interval History:  Patient is a 50-year-old woman here for surveillance  She has no complaints to report  Her last mammogram was in the summer of   She is doing well and tolerating her treatment with little to no side effect  Review of Systems:  Review of Systems   Constitutional: Negative  HENT: Negative  Eyes: Negative  Respiratory: Negative  Cardiovascular: Negative  Gastrointestinal: Negative  Endocrine: Negative  Genitourinary: Negative  Musculoskeletal: Negative  Skin: Negative  Allergic/Immunologic: Negative  Neurological: Negative  Hematological: Negative  Psychiatric/Behavioral: Negative          Patient Active Problem List   Diagnosis    Essential hypertension    Asthma    Anxiety    Insomnia    Pneumonia    Moderate protein-calorie malnutrition (HCC)    Anemia    Benign essential hypertension    Cervical high risk HPV (human papillomavirus) test positive    Constipation    Dyslipidemia    Hypokalemia    Iron deficiency anemia    Lymphedema, limb    Major depressive disorder with single episode, in partial remission ([address] )    Malignant neoplasm of overlapping sites of left female breast ([address] )    Use of tamoxifen (Nolvadex)     Past Medical History:   Diagnosis Date    Anxiety     Asthma     Breast cancer ([address] )     left breast chemo only     Hypertension     Insomnia      Past Surgical History:   Procedure Laterality Date     SECTION      MUSCLE FLAP Left 10/25/2016    Procedure: BREAST WOUND CLOSURE WITH LOCAL FLAP ;  Surgeon: Adria Hinojosa MD;  [facility];  Service:     [place] INSJ TUNNELED CTR VAD W/SUBQ PORT AGE 5 YR/> Right 4/26/2016    Procedure: VENOUS PORT PLACEMENT ;  Surgeon: Steve Campos MD;  [facility];  Service: Surgical Oncology    [place] MASTECTOMY, MODIFIED RADICAL Left 10/25/2016    Procedure: MODIFIED RADICAL MASTECTOMY;  Surgeon: Steve Campos MD;  [facility];  Service: Surgical Oncology     Family History   Problem Relation Age of Onset    Hypertension Mother     Hyperlipidemia Mother     Diabetes Mother     Heart disease Mother     Prostate cancer Father     Hypertension Father     Heart disease Brother      Social History     Social History    Marital status: Single     Spouse name: N/A    Number of children: N/A    Years of education: N/A     Occupational History    Not on file  Social History Main Topics    Smoking status: Never Smoker    Smokeless tobacco: Never Used    Alcohol use No    Drug use: No    Sexual activity: Not on file     Other Topics Concern    Not on file     Social History Narrative    No narrative on file       Current Outpatient Prescriptions:     amLODIPine (NORVASC) 5 mg tablet, Take 5 mg by mouth daily, Disp: , Rfl:     buPROPion (ZYBAN) 150 MG 12 hr tablet, Take 150 mg by mouth daily, Disp: , Rfl:     calcium carbonate-vitamin D (OSCAL-D) 500 mg-200 units per tablet, Take 1 tablet by mouth daily with breakfast, Disp: , Rfl:     escitalopram (LEXAPRO) 10 mg tablet, Take 10 mg by mouth daily, Disp: , Rfl:     lisinopril-hydrochlorothiazide (PRINZIDE,ZESTORETIC) 20-12 5 MG per tablet, Take 1 tablet by mouth daily in the early morning, Disp: , Rfl:     metoprolol tartrate (LOPRESSOR) 50 mg tablet, Take 50 mg by mouth daily  , Disp: , Rfl:     tamoxifen (NOLVADEX) 20 mg tablet, Take 1 tablet (20 mg total) by mouth daily, Disp: 90 tablet, Rfl: 2    zolpidem (AMBIEN CR) 6 25 MG CR tablet, Take 10 mg by mouth daily at bedtime as needed for sleep  , Disp: , Rfl:     albuterol (PROVENTIL HFA,VENTOLIN HFA) 90 mcg/act inhaler, Inhale 2 puffs every 4 (four) hours as needed for wheezing (Patient not taking: Reported on 1/11/2019 ), Disp: 1 Inhaler, Rfl: 0    LORazepam (ATIVAN) 1 mg tablet, Take 0 5 tablets (0 5 mg total) by mouth 2 (two) times a day as needed for anxiety for up to 10 doses (Patient not taking: Reported on 1/11/2019 ), Disp: 10 tablet, Rfl: 0  No Known Allergies  Vitals:    01/11/19 0940   BP: 150/90   Pulse: 96   Resp: 16   Temp: 98 °F (36 7 °C)       Physical Exam   Constitutional: She is oriented to person, place, and time  She appears well-developed and well-nourished  HENT:   Head: Normocephalic and atraumatic  Right Ear: External ear normal    Left Ear: External ear normal    Eyes: Pupils are equal, round, and reactive to light  Conjunctivae are normal    Neck: Normal range of motion  Neck supple  Cardiovascular: Normal rate, regular rhythm and normal heart sounds  Pulmonary/Chest: Effort normal and breath sounds normal    Abdominal: Soft  Bowel sounds are normal    Musculoskeletal: Normal range of motion  Lymphadenopathy:     She has no cervical adenopathy  Neurological: She is alert and oriented to person, place, and time  Skin: Skin is warm and dry  Right breast axilla normal to inspection and palpation  Left breast flaps well healed without evidence of recurrence visible or palpable  Negative axillary adenopathy  No cervical or supraclavicular adenopathy  Psychiatric: She has a normal mood and affect  Her behavior is normal  Judgment and thought content normal          Results:  Labs:  CBC, Coags, BMP, Mg, Phos     Imaging  No results found  I reviewed the above laboratory and imaging data  Discussion/Summary:  80-year-old woman with history of locally advanced breast cancer  She is doing well    She will continue to marks and therapy per medical oncology team   Will order mammogram for screening/surveillance in June of this year  Make referral to Plastic surgery team to discuss reconstruction/implant

## 2019-03-30 ENCOUNTER — HOSPITAL ENCOUNTER (EMERGENCY)
Facility: HOSPITAL | Age: 54
Discharge: HOME | End: 2019-03-30
Attending: EMERGENCY MEDICINE | Admitting: EMERGENCY MEDICINE
Payer: COMMERCIAL

## 2019-03-30 VITALS
BODY MASS INDEX: 26.17 KG/M2 | SYSTOLIC BLOOD PRESSURE: 142 MMHG | TEMPERATURE: 98 F | HEART RATE: 108 BPM | OXYGEN SATURATION: 99 % | RESPIRATION RATE: 18 BRPM | DIASTOLIC BLOOD PRESSURE: 75 MMHG | WEIGHT: 125.22 LBS

## 2019-03-30 DIAGNOSIS — S05.02XA ABRASION OF LEFT CORNEA, INITIAL ENCOUNTER: ICD-10-CM

## 2019-03-30 DIAGNOSIS — H10.9 CONJUNCTIVITIS, LEFT EYE: Primary | ICD-10-CM

## 2019-03-30 PROCEDURE — 99283 EMERGENCY DEPT VISIT LOW MDM: CPT

## 2019-03-30 RX ORDER — TETRACAINE HYDROCHLORIDE 5 MG/ML
2 SOLUTION OPHTHALMIC ONCE
Status: COMPLETED | OUTPATIENT
Start: 2019-03-30 | End: 2019-03-30

## 2019-03-30 RX ORDER — POLYMYXIN B SULFATE AND TRIMETHOPRIM 1; 10000 MG/ML; [USP'U]/ML
1 SOLUTION OPHTHALMIC EVERY 4 HOURS
Qty: 10 ML | Refills: 0 | Status: SHIPPED | OUTPATIENT
Start: 2019-03-30 | End: 2019-04-06

## 2019-03-30 RX ADMIN — FLUORESCEIN SODIUM 1 STRIP: 1 STRIP OPHTHALMIC at 13:21

## 2019-03-30 RX ADMIN — TETRACAINE HYDROCHLORIDE 2 DROP: 5 SOLUTION OPHTHALMIC at 13:21

## 2019-07-11 ENCOUNTER — CONSULT (OUTPATIENT)
Dept: PLASTIC SURGERY | Facility: CLINIC | Age: 54
End: 2019-07-11
Payer: COMMERCIAL

## 2019-07-11 ENCOUNTER — HOSPITAL ENCOUNTER (OUTPATIENT)
Dept: MAMMOGRAPHY | Facility: CLINIC | Age: 54
Discharge: HOME | End: 2019-07-11
Payer: COMMERCIAL

## 2019-07-11 VITALS
HEART RATE: 109 BPM | WEIGHT: 125 LBS | RESPIRATION RATE: 14 BRPM | BODY MASS INDEX: 25.2 KG/M2 | HEIGHT: 59 IN | TEMPERATURE: 96.1 F | SYSTOLIC BLOOD PRESSURE: 140 MMHG | DIASTOLIC BLOOD PRESSURE: 86 MMHG

## 2019-07-11 DIAGNOSIS — C50.812 MALIGNANT NEOPLASM OF OVERLAPPING SITES OF LEFT BREAST IN FEMALE, ESTROGEN RECEPTOR POSITIVE (HCC): ICD-10-CM

## 2019-07-11 DIAGNOSIS — Z90.12 ACQUIRED ABSENCE OF BREAST AND ABSENT NIPPLE, LEFT: ICD-10-CM

## 2019-07-11 DIAGNOSIS — C50.812 MALIGNANT NEOPLASM OF OVERLAPPING SITES OF LEFT BREAST IN FEMALE, ESTROGEN RECEPTOR POSITIVE (HCC): Primary | ICD-10-CM

## 2019-07-11 DIAGNOSIS — Z92.3 HX OF RADIATION THERAPY: ICD-10-CM

## 2019-07-11 DIAGNOSIS — Z17.0 MALIGNANT NEOPLASM OF OVERLAPPING SITES OF LEFT BREAST IN FEMALE, ESTROGEN RECEPTOR POSITIVE (HCC): Primary | ICD-10-CM

## 2019-07-11 DIAGNOSIS — Z17.0 MALIGNANT NEOPLASM OF OVERLAPPING SITES OF LEFT BREAST IN FEMALE, ESTROGEN RECEPTOR POSITIVE (HCC): ICD-10-CM

## 2019-07-11 PROCEDURE — 99214 OFFICE O/P EST MOD 30 MIN: CPT | Performed by: PLASTIC SURGERY

## 2019-07-11 PROCEDURE — 77065 DX MAMMO INCL CAD UNI: CPT

## 2019-07-11 PROCEDURE — G0279 TOMOSYNTHESIS, MAMMO: HCPCS

## 2019-07-11 NOTE — PROGRESS NOTES
Assessment/Plan   Diagnoses and all orders for this visit:    Malignant neoplasm of overlapping sites of left breast in female, estrogen receptor positive ([address] )  -     CTA abdomen pelvis w wo contrast; Future    Acquired absence of breast and absent nipple, left    Hx of radiation therapy      Subjective   Patient ID: Rosario Stanley is a 49 y o  female  Vitals:    07/11/19 1312   BP: 140/86   Pulse: (!) 109   Resp: 14   Temp: (!) 96 1 °F (35 6 °C)     HPI    The following portions of the patient's history were reviewed and updated as appropriate: allergies, current medications, past family history, past medical history, past social history, past surgical history and problem list     Review of Systems   Constitutional: Negative  HENT: Negative  Eyes: Negative  Respiratory: Negative  Cardiovascular: Negative  Gastrointestinal: Negative  Endocrine: Negative  Genitourinary: Negative  Musculoskeletal: Negative  Skin: Negative  Allergic/Immunologic: Negative  Neurological: Negative  Hematological: Negative  Psychiatric/Behavioral: Negative          Objective   Physical Exam     Breast  Breast Measurements:   Right Left   A: Sternal notch to nipple distance (cm) 20 5    B: Midsternum to nipple distance (cm)     C: Midclavicle to nipple distance (cm)     D: Nipple to inframammary fold (cm) 8 5    E: Base width (cm) 13 5    F: Inframammary distance (cm)       Right Left   Areolar diameter (cm) 3    Nipple diameter (cm)     Superior tissue pinch test (cm)     Breast ptosis (grade 0-3) 0

## 2019-07-11 NOTE — PROGRESS NOTES
Assessment/Plan   Diagnoses and all orders for this visit:    Malignant neoplasm of overlapping sites of left breast in female, estrogen receptor positive ([address] )  -     CTA abdomen pelvis w wo contrast; Future    Acquired absence of breast and absent nipple, left    Hx of radiation therapy    Pertinent reconstruction options were presented as outlined below  A detailed conversation was had regarding the patients options for breast reconstruction  Five main points, which are explained to all breast reconstruction patients, were discussed  1) Breast reconstruction is an optional process  In addition, breast reconstruction can be performed in an immediate and delayed fashion  Even if a patient does not opt for reconstruction now, it can performed at a later time  2) Breast reconstruction is a multi-stage process which involves multiple surgeries spaced several months apart  The entire process can take over one year  The patient can stop within this process and/or resume it again at any time  3) The major goal of breast reconstruction is to have the patient look normal in clothing  When naked, there will always be scars and other stigmata of the breast reconstruction process  4) Asymmetries are often present during the reconstruction process  Several operations may be needed, including surgery to the non-cancerous breast, to achieve satisfactory results  5) No matter the reconstructive method, there are ways that the reconstruction can fail and a secondary reconstructive plan would need to be created  Next, a general discussion regarding all available methods of breast reconstruction were discussed  The types of reconstructions described included:    1) Tissue expander and implant based reconstruction, both single and multi-stage approaches  2) Autologous only reconstructions, including free abdominal-tissue based reconstructions      3) Combination procedures, particularly latissismus dorsi flaps combined with either expanders or implants  For each of the reconstruction methods mentioned above, the risks, benefits, alternatives, scarring, and recovery time were discussed in great detail  Specific risks detailed included bleeding, infection, hematoma, seroma, scarring, pain, wound healing complications, flap loss, fat necrosis, capsular contracture, need for implant removal, donor site complications, bulge, hernia, umbilical necrosis, need for urgent reoperation, and need for dressing changes were discussed  The patient would be a candidate for:     -Implant based reconstruction: No, due to history of radiation and significant tissue loss and loss of IMF boundaries I do not recommend this approach   -Abdominally based free flap reconstruction: Yes, this will require a unilateral vs bilateral MARU flap as a siamese flap to provide enough tissue and skin to replace radiation changes and volume replacement    -LD+implant based reconstruction: Yes, this will provide skin needed and muscle coverage for tissue expander and subsequent exchange to implant  After a long discussion about these factors, the patient would like to pursue autologous breast reconstruction with free abdominal tissue transfer  We will plan to see her for a preoperative visit once a surgical date has been identified  A CT angio of abdomen and pelvis was ordered for preoperative planning  60 minutes were spent face to face with the patient, of which over half were counseling and coordination of care  Edmund Rogers MD   [facility] Reconstructive Surgery   [address] [facility]    [address] [facility], [address] [facility]   Office: [phone]      Subjective   Patient ID: Keagan Rice is a 49 y o  female      Vitals:    07/11/19 1312   BP: 140/86   Pulse: (!) 109   Resp: 14   Temp: (!) 96 1 °F (35 6 °C)     Mrs Luz Mccarthy is a 49years old female with history of left breast cancer who presents for breast reconstruction evaluation  Patient  Was found to have a local aggressive left breast invasive cancer with mucinous differentiation for which she underwent neoadjuvant chemotherapy and eventual modified radical mastectomy with Dr Ricky Steele and closure of left breast with local flap with Dr Vivian Monge on Oct 15th 2019  She completed radiation therapy on 2017 and last surveillance with no evidence of recurrence  Patient characteristics  Significant medical history: HTN  Prior abdominal surgery:    Tobacco use: none  Current bra size: 34B  Desired bra size: same  BMI: 25  PMH/FH of DVT/PE: none  PMH/FH of miscarriages: none  Occupation: n/a   Patient is G5, P 4, C 1  No family history of breast cancer  Malignant neoplasm of overlapping sites of left female breast [facility])    2015 - 3/2016 Hormone Therapy       Tamoxifen from 2015 through 2016 by previous oncologist in [facility]           3/24/2016 Biopsy      Left breast core biopsy     Invasive mammary carcinoma with mucinous differentiation  %  AL 15-20%  HER-2 positive           - 2016 Chemotherapy      Neoadjuvant chemotherapy with dose dense AC x4 followed by 12 weekly paclitaxel, trastuzumab, and triweekly pertuzumab  Completed in early 2016           10/26/2016 Surgery      Modified radical mastectomy (Dr Ricky Steele)     Two microscopic foci of residual viable microcarcinoma, each measuring 0 5 mm  Eighteen (18) axillary lymph nodes, negative for carcinoma  One (1) lymph node with therapy effect       7th ED AJCC Stage:  at least Stage IA - ypT1mi, ypN0, G2, R1           - 2017 Chemotherapy      Adjuvant trastuzumab monotherapy until 2017            12/15/2016 - 2017 Radiation      completed a course of adjuvant postmastectomy radiation to the chest wall and draining lymph          2017 -  Hormone Therapy      Adjuvant hormonal therapy with tamoxifen since 2017                  The following portions of the patient's history were reviewed and updated as appropriate: allergies, current medications, past family history, past medical history, past social history, past surgical history and problem list     Review of Systems   Constitutional: Negative  HENT: Negative  Eyes: Negative  Respiratory: Negative  Cardiovascular: Negative  Gastrointestinal: Negative  Endocrine: Negative  Genitourinary: Negative  Musculoskeletal: Negative  Skin: Negative  Allergic/Immunologic: Negative  Neurological: Negative  Hematological: Negative  Psychiatric/Behavioral: Negative  Objective   Physical Exam   Constitutional  She appears well-developed and well-nourished  Eyes  Pupils are equal, round, and reactive to light  Cardiovascular: Normal rate  Pulmonary/Chest  Effort normal      Skin  Skin is warm  Psychiatric  She has a normal mood and affect  Her behavior is normal  Judgment and thought content normal      Abdomen and Hips  Soft  Hernia confirmed negative in the ventral area  She has abdominal separation  Abdominal shape is protuberant  She has abdominal striae  A surgical scar is present  She has vertical, abdominal skin redundancy  Patient has excess abdominal fat  Excess fat located in the: lower abdomen and caitlin-umbilical        Previous incision scars :   Rectus abdominis muscle: contracts normally bilaterally  Hernias absent    Soft tissue is adequate for breast reconstruction (unilateral)          Breast  Breast Measurements:   Right Left   A: Sternal notch to nipple distance (cm) 20 5    B: Midsternum to nipple distance (cm)     C: Midclavicle to nipple distance (cm)     D: Nipple to inframammary fold (cm) 8 5    E: Base width (cm) 13 5    F: Inframammary distance (cm)       Right Left   Areolar diameter (cm) 3    Nipple diameter (cm)     Superior tissue pinch test (cm)     Breast ptosis (grade 0-3) 0 Her breasts have decreased colors  Her breasts have tight envelope compliance  Her breasts have compromised tissue coverage  Additional breast conditions include the following:     Left Breast: Positive for skin change  Back and Buttocks  She is positive for back and buttocks lipodystrophy       - Latissimus dorsi muscle: contracts normally bilaterally

## 2019-07-11 NOTE — LETTER
July 11, 2019     Yudelka Daily MD  [address] [facility]  [address] [facility] [address]    Patient: Edelmira Isaacs   YOB: 1965   Date of Visit: 7/11/2019       Dear Dr Tora Canavan: Thank you for referring Edelmira Isaacs to me for evaluation  Below are my notes for this consultation  If you have questions, please do not hesitate to call me  I look forward to following your patient along with you  Sincerely,        Troy Christian MD        CC: MD Troy Costa MD  7/11/2019  2:45 PM  Sign at close encounter  Assessment/Plan   Diagnoses and all orders for this visit:    Malignant neoplasm of overlapping sites of left breast in female, estrogen receptor positive ([facility] [address] )  -     CTA abdomen pelvis w wo contrast; Future    Acquired absence of breast and absent nipple, left    Hx of radiation therapy    Pertinent reconstruction options were presented as outlined below  A detailed conversation was had regarding the patients options for breast reconstruction  Five main points, which are explained to all breast reconstruction patients, were discussed  1) Breast reconstruction is an optional process  In addition, breast reconstruction can be performed in an immediate and delayed fashion  Even if a patient does not opt for reconstruction now, it can performed at a later time  2) Breast reconstruction is a multi-stage process which involves multiple surgeries spaced several months apart  The entire process can take over one year  The patient can stop within this process and/or resume it again at any time  3) The major goal of breast reconstruction is to have the patient look normal in clothing  When naked, there will always be scars and other stigmata of the breast reconstruction process  4) Asymmetries are often present during the reconstruction process    Several operations may be needed, including surgery to the non-cancerous breast, to achieve satisfactory results  5) No matter the reconstructive method, there are ways that the reconstruction can fail and a secondary reconstructive plan would need to be created  Next, a general discussion regarding all available methods of breast reconstruction were discussed  The types of reconstructions described included:    1) Tissue expander and implant based reconstruction, both single and multi-stage approaches  2) Autologous only reconstructions, including free abdominal-tissue based reconstructions  3) Combination procedures, particularly latissismus dorsi flaps combined with either expanders or implants  For each of the reconstruction methods mentioned above, the risks, benefits, alternatives, scarring, and recovery time were discussed in great detail  Specific risks detailed included bleeding, infection, hematoma, seroma, scarring, pain, wound healing complications, flap loss, fat necrosis, capsular contracture, need for implant removal, donor site complications, bulge, hernia, umbilical necrosis, need for urgent reoperation, and need for dressing changes were discussed  The patient would be a candidate for:     -Implant based reconstruction: No, due to history of radiation and significant tissue loss and loss of IMF boundaries I do not recommend this approach   -Abdominally based free flap reconstruction: Yes, this will require a unilateral vs bilateral MARU flap as a siamese flap to provide enough tissue and skin to replace radiation changes and volume replacement    -LD+implant based reconstruction: Yes, this will provide skin needed and muscle coverage for tissue expander and subsequent exchange to implant  After a long discussion about these factors, the patient would like to pursue autologous breast reconstruction with free abdominal tissue transfer       We will plan to see her for a preoperative visit once a surgical date has been identified  A CT angio of abdomen and pelvis was ordered for preoperative planning  60 minutes were spent face to face with the patient, of which over half were counseling and coordination of care  Venancio Aldana MD   [facility] Reconstructive Surgery   [address]   [address] [facility], [address]   Office: [phone]      Subjective   Patient ID: Sabrina Rodarte is a 49 y o  female  Vitals:    19 1312   BP: 140/86   Pulse: (!) 109   Resp: 14   Temp: (!) 96 1 °F (35 6 °C)     Mrs Goldie Artis is a 49years old female with history of left breast cancer who presents for breast reconstruction evaluation  Patient  Was found to have a local aggressive left breast invasive cancer with mucinous differentiation for which she underwent neoadjuvant chemotherapy and eventual modified radical mastectomy with Dr Ana Gunderson and closure of left breast with local flap with Dr Kelley Garay on Oct 15th 2019  She completed radiation therapy on 2017 and last surveillance with no evidence of recurrence  Patient characteristics  Significant medical history: HTN  Prior abdominal surgery:    Tobacco use: none  Current bra size: 34B  Desired bra size: same  BMI: 25  PMH/FH of DVT/PE: none  PMH/FH of miscarriages: none  Occupation: n/a   Patient is G5, P 4, C 1  No family history of breast cancer  Malignant neoplasm of overlapping sites of left female breast [facility])    2015 - 3/2016 Hormone Therapy       Tamoxifen from 2015 through 2016 by previous oncologist in [facility]           3/24/2016 Biopsy      Left breast core biopsy     Invasive mammary carcinoma with mucinous differentiation  %  OH 15-20%  HER-2 positive           - 2016 Chemotherapy      Neoadjuvant chemotherapy with dose dense AC x4 followed by 12 weekly paclitaxel, trastuzumab, and triweekly pertuzumab   Completed in early September 2016           10/26/2016 Surgery      Modified radical mastectomy (Dr Steph Zee)     Two microscopic foci of residual viable microcarcinoma, each measuring 0 5 mm  Eighteen (18) axillary lymph nodes, negative for carcinoma  One (1) lymph node with therapy effect       7th ED AJCC Stage:  at least Stage IA - ypT1mi, ypN0, G2, R1           - 6/2017 Chemotherapy      Adjuvant trastuzumab monotherapy until June 2017            12/15/2016 - 2/1/2017 Radiation      completed a course of adjuvant postmastectomy radiation to the chest wall and draining lymph          2/2017 -  Hormone Therapy      Adjuvant hormonal therapy with tamoxifen since February 2017                    The following portions of the patient's history were reviewed and updated as appropriate: allergies, current medications, past family history, past medical history, past social history, past surgical history and problem list     Review of Systems   Constitutional: Negative  HENT: Negative  Eyes: Negative  Respiratory: Negative  Cardiovascular: Negative  Gastrointestinal: Negative  Endocrine: Negative  Genitourinary: Negative  Musculoskeletal: Negative  Skin: Negative  Allergic/Immunologic: Negative  Neurological: Negative  Hematological: Negative  Psychiatric/Behavioral: Negative  Objective   Physical Exam   Constitutional  She appears well-developed and well-nourished  Eyes  Pupils are equal, round, and reactive to light  Cardiovascular: Normal rate  Pulmonary/Chest  Effort normal      Skin  Skin is warm  Psychiatric  She has a normal mood and affect  Her behavior is normal  Judgment and thought content normal      Abdomen and Hips  Soft  Hernia confirmed negative in the ventral area  She has abdominal separation  Abdominal shape is protuberant  She has abdominal striae  A surgical scar is present  She has vertical, abdominal skin redundancy  Patient has excess abdominal fat   Excess fat located in the: lower abdomen and caitlin-umbilical        Previous incision scars :   Rectus abdominis muscle: contracts normally bilaterally  Hernias absent  Soft tissue is adequate for breast reconstruction (unilateral)          Breast  Breast Measurements:   Right Left   A: Sternal notch to nipple distance (cm) 20 5    B: Midsternum to nipple distance (cm)     C: Midclavicle to nipple distance (cm)     D: Nipple to inframammary fold (cm) 8 5    E: Base width (cm) 13 5    F: Inframammary distance (cm)       Right Left   Areolar diameter (cm) 3    Nipple diameter (cm)     Superior tissue pinch test (cm)     Breast ptosis (grade 0-3) 0          Her breasts have decreased colors  Her breasts have tight envelope compliance  Her breasts have compromised tissue coverage  Additional breast conditions include the following:     Left Breast: Positive for skin change  Back and Buttocks  She is positive for back and buttocks lipodystrophy       - Latissimus dorsi muscle: contracts normally bilaterally

## 2019-07-19 PROBLEM — C50.812 MALIGNANT NEOPLASM OF OVERLAPPING SITES OF LEFT BREAST IN FEMALE, ESTROGEN RECEPTOR POSITIVE (HCC): Status: ACTIVE | Noted: 2019-07-19

## 2019-07-19 PROBLEM — Z17.0 MALIGNANT NEOPLASM OF OVERLAPPING SITES OF LEFT BREAST IN FEMALE, ESTROGEN RECEPTOR POSITIVE (HCC): Status: ACTIVE | Noted: 2019-07-19

## 2019-08-14 ENCOUNTER — TELEPHONE (OUTPATIENT)
Dept: PLASTIC SURGERY | Facility: CLINIC | Age: 54
End: 2019-08-14

## 2019-08-26 NOTE — PRE-PROCEDURE INSTRUCTIONS
Pre-Surgery Instructions:   Medication Instructions    albuterol (PROVENTIL HFA,VENTOLIN HFA) 90 mcg/act inhaler Instructed patient per Anesthesia Guidelines   amLODIPine (NORVASC) 5 mg tablet Instructed patient per Anesthesia Guidelines   buPROPion (ZYBAN) 150 MG 12 hr tablet Instructed patient per Anesthesia Guidelines   calcium carbonate-vitamin D (OSCAL-D) 500 mg-200 units per tablet Instructed patient per Anesthesia Guidelines   escitalopram (LEXAPRO) 10 mg tablet Instructed patient per Anesthesia Guidelines   lisinopril-hydrochlorothiazide (PRINZIDE,ZESTORETIC) 20-12 5 MG per tablet Instructed patient per Anesthesia Guidelines   metoprolol tartrate (LOPRESSOR) 50 mg tablet Instructed patient per Anesthesia Guidelines   tamoxifen (NOLVADEX) 20 mg tablet Patient was instructed by Physician and understands   zolpidem (AMBIEN CR) 6 25 MG CR tablet Instructed patient per Anesthesia Guidelines  preop and bathing instructions reviewed  Pt getting hibiclens

## 2019-08-29 ENCOUNTER — HOSPITAL ENCOUNTER (OUTPATIENT)
Dept: CT IMAGING | Facility: HOSPITAL | Age: 54
Discharge: HOME | End: 2019-08-29
Attending: PLASTIC SURGERY
Payer: COMMERCIAL

## 2019-08-29 DIAGNOSIS — Z17.0 MALIGNANT NEOPLASM OF OVERLAPPING SITES OF LEFT BREAST IN FEMALE, ESTROGEN RECEPTOR POSITIVE (HCC): ICD-10-CM

## 2019-08-29 DIAGNOSIS — C50.812 MALIGNANT NEOPLASM OF OVERLAPPING SITES OF LEFT BREAST IN FEMALE, ESTROGEN RECEPTOR POSITIVE (HCC): ICD-10-CM

## 2019-08-29 PROCEDURE — 74174 CTA ABD&PLVS W/CONTRAST: CPT

## 2019-08-29 RX ADMIN — IOHEXOL 100 ML: 350 INJECTION, SOLUTION INTRAVENOUS at 18:54

## 2019-09-02 PROCEDURE — NC001 PR NO CHARGE: Performed by: PHYSICIAN ASSISTANT

## 2019-09-02 NOTE — H&P
H&P - Plastic Surgery   Alisia Ingram 49 y o  female MRN: [id number]  Unit/Bed#:  Encounter: [id number]           Assessment:  Malignant neoplasm of overlapping sites of left breast in female, estrogen receptor positive ([facility] [address] ) [C50 812, Z17 0]  Plan:  BREAST RECONSTRUCTION W/FLAP FREE DEEP INFERIOR EPIGASTRIC  (MARU)/ MESH PLACEMENT (Left Back)   Anesthesia type: General             HPI:   Alisia Ingram is a 49y o  year old female who presents with history of left breast cancer who presents for breast reconstruction evaluation  Patient  Was found to have a local aggressive left breast invasive cancer with mucinous differentiation for which she underwent neoadjuvant chemotherapy and eventual modified radical mastectomy with Dr Marilu Street and closure of left breast with local flap with Dr Corrinne Gift on Oct 15th 2019  She completed radiation therapy on 2017 and last surveillance with no evidence of recurrence  Patient characteristics  Significant medical history: HTN  Prior abdominal surgery:    Tobacco use: none  Current bra size: 34B  Desired bra size: same  BMI: 25  PMH/FH of DVT/PE: none  PMH/FH of miscarriages: none  Occupation: n/a   Patient is G5, P 4, C 1  No family history of breast cancer          Malignant neoplasm of overlapping sites of left female breast [facility]              2015 - 3/2016         Hormone Therapy                           Tamoxifen from 2015 through 2016 by previous oncologist in [facility]                     3/24/2016        Biopsy                          Left breast core biopsy     Invasive mammary carcinoma with mucinous differentiation  %  OH 15-20%  HER-2 positive                     - 2016          Chemotherapy                          Neoadjuvant chemotherapy with dose dense AC x4 followed by 12 weekly paclitaxel, trastuzumab, and triweekly pertuzumab   Completed in early 2016                     10/26/2016 Surgery                          Modified radical mastectomy (Dr Thalia Resendiz)     Two microscopic foci of residual viable microcarcinoma, each measuring 0 5 mm  Eighteen (18) axillary lymph nodes, negative for carcinoma  One (1) lymph node with therapy effect       7th ED AJCC Stage:  at least Stage IA - ypT1mi, ypN0, G2, R1                     - 2017          Chemotherapy                          Adjuvant trastuzumab monotherapy until 2017                      12/15/2016 - 2017 Radiation                          completed a course of adjuvant postmastectomy radiation to the chest wall and draining lymph                    2017 -           Hormone Therapy                          Adjuvant hormonal therapy with tamoxifen since 2017  REVIEW OF SYSTEMS    Constitutional: Negative  HENT: Negative  Eyes: Negative  Respiratory: Negative  Cardiovascular: Negative  Gastrointestinal: Negative  Endocrine: Negative  Genitourinary: Negative  Musculoskeletal: Negative  Skin: Negative  Allergic/Immunologic: Negative  Neurological: Negative  Hematological: Negative  Psychiatric/Behavioral: Negative        Historical Information   Past Medical History:   Diagnosis Date    Anxiety     Asthma     Breast cancer ([address] )     left breast chemo only     Depression     History of chemotherapy     Hypertension     Insomnia      Past Surgical History:   Procedure Laterality Date     SECTION      MASTECTOMY Left 10/26/2016    MUSCLE FLAP Left 10/25/2016    Procedure: BREAST WOUND CLOSURE WITH LOCAL FLAP ;  Surgeon: Mika Dumont MD;  Location: [facility];  Service:     MN INSJ TUNNELED CTR VAD W/SUBQ PORT AGE 5 YR/> Right 2016    Procedure: VENOUS PORT PLACEMENT ;  Surgeon: Samy Cuba MD;  Location: [facility];  Service: Surgical Oncology    MN MASTECTOMY, MODIFIED RADICAL Left 10/25/2016    Procedure: MODIFIED RADICAL MASTECTOMY;  Surgeon: Millie Bradford Lalo Hanna MD;  Location: [facility];  Service: Surgical Oncology     Social History   Social History     Substance and Sexual Activity   Alcohol Use No     Social History     Substance and Sexual Activity   Drug Use No     Social History     Tobacco Use   Smoking Status Never Smoker   Smokeless Tobacco Never Used     Family History:   Family History   Problem Relation Age of Onset    Hypertension Mother     Hyperlipidemia Mother     Diabetes Mother     Heart disease Mother     Prostate cancer Father     Hypertension Father     Heart disease Brother        Meds/Allergies   all current active meds have been reviewed      Objective     Physical Exam   Constitutional  She appears well-developed and well-nourished       Eyes  Pupils are equal, round, and reactive to light          Cardiovascular: Normal rate       Pulmonary/Chest  Effort normal       Skin  Skin is warm       Psychiatric  She has a normal mood and affect  Her behavior is normal  Judgment and thought content normal       Abdomen and Hips  Soft  Hernia confirmed negative in the ventral area  She has abdominal separation  Abdominal shape is protuberant  She has abdominal striae  A surgical scar is present  She has vertical, abdominal skin redundancy  Patient has excess abdominal fat  Excess fat located in the: lower abdomen and caitlin-umbilical        Previous incision scars :   Rectus abdominis muscle: contracts normally bilaterally  Hernias absent    Soft tissue is adequate for breast reconstruction (unilateral)           Breast  Breast Measurements:    Right Left   A: Sternal notch to nipple distance (cm) 20 5     B: Midsternum to nipple distance (cm)       C: Midclavicle to nipple distance (cm)       D: Nipple to inframammary fold (cm) 8 5     E: Base width (cm) 13 5     F: Inframammary distance (cm)         Right Left   Areolar diameter (cm) 3     Nipple diameter (cm)       Superior tissue pinch test (cm)       Breast ptosis (grade 0-3) 0            Her breasts have decreased colors  Her breasts have tight envelope compliance  Her breasts have compromised tissue coverage  Additional breast conditions include the following:      Left Breast: Positive for skin change         Back and Buttocks  She is positive for back and buttocks lipodystrophy  - Latissimus dorsi muscle: contracts normally bilaterally      Lab Results:   Lab Results   Component Value Date    WBC 6 70 09/10/2018    HGB 11 9 09/10/2018    HCT 34 8 09/10/2018    MCV 83 09/10/2018     09/10/2018          No results found for: TISSUECULT, WOUNDCULT      Imaging Studies:   No results found  EKG, Pathology, and Other Studies:   Lab Results   Component Value Date/Time    FINALDX  12/15/2017     A  Skin, Chest skin lesion, punch biopsy:  - Scar and changes consistent with prior surgical site  -- Superficial portion of granulomatous inflammation with foreign body giant cell        reaction to suture material  See Note  - Negative for malignancy           No intake or output data in the 24 hours ending 09/02/19 1059    Invasive Devices     Peripheral Intravenous Line            Peripheral IV 09/10/18 Right Antecubital 356 days                VTE Prophylaxis: Sequential compression device Sarah Nino)     Code Status: Prior  Advance Directive and Living Will:      Power of :

## 2019-09-03 ENCOUNTER — TRANSCRIBE ORDERS (OUTPATIENT)
Dept: ADMINISTRATIVE | Facility: HOSPITAL | Age: 54
End: 2019-09-03

## 2019-09-03 ENCOUNTER — HOSPITAL ENCOUNTER (OUTPATIENT)
Dept: NON INVASIVE DIAGNOSTICS | Facility: HOSPITAL | Age: 54
Discharge: HOME | End: 2019-09-03
Attending: PLASTIC SURGERY

## 2019-09-03 ENCOUNTER — LAB (OUTPATIENT)
Dept: LAB | Facility: HOSPITAL | Age: 54
End: 2019-09-03
Attending: PLASTIC SURGERY
Payer: COMMERCIAL

## 2019-09-03 DIAGNOSIS — C50.812 MALIGNANT NEOPLASM OF OVERLAPPING SITES OF LEFT BREAST IN FEMALE, ESTROGEN RECEPTOR POSITIVE (HCC): ICD-10-CM

## 2019-09-03 DIAGNOSIS — Z17.0 MALIGNANT NEOPLASM OF OVERLAPPING SITES OF LEFT BREAST IN FEMALE, ESTROGEN RECEPTOR POSITIVE (HCC): ICD-10-CM

## 2019-09-03 DIAGNOSIS — Z90.12 ACQUIRED ABSENCE OF BREAST AND ABSENT NIPPLE, LEFT: Primary | ICD-10-CM

## 2019-09-03 DIAGNOSIS — Z90.12 ACQUIRED ABSENCE OF LEFT BREAST AND NIPPLE: ICD-10-CM

## 2019-09-03 DIAGNOSIS — Z90.12 ACQUIRED ABSENCE OF LEFT BREAST AND NIPPLE: Primary | ICD-10-CM

## 2019-09-03 LAB
ALBUMIN SERPL BCP-MCNC: 3.8 G/DL (ref 3.5–5)
ALP SERPL-CCNC: 91 U/L (ref 46–116)
ALT SERPL W P-5'-P-CCNC: 29 U/L (ref 12–78)
ANION GAP SERPL CALCULATED.3IONS-SCNC: 10 MMOL/L (ref 4–13)
AST SERPL W P-5'-P-CCNC: 21 U/L (ref 5–45)
BASOPHILS # BLD AUTO: 0.04 THOUSANDS/ΜL (ref 0–0.1)
BASOPHILS NFR BLD AUTO: 1 % (ref 0–1)
BILIRUB SERPL-MCNC: 0.52 MG/DL (ref 0.2–1)
BUN SERPL-MCNC: 19 MG/DL (ref 5–25)
CALCIUM SERPL-MCNC: 9.4 MG/DL (ref 8.3–10.1)
CHLORIDE SERPL-SCNC: 101 MMOL/L (ref 100–108)
CO2 SERPL-SCNC: 28 MMOL/L (ref 21–32)
CREAT SERPL-MCNC: 0.8 MG/DL (ref 0.6–1.3)
EOSINOPHIL # BLD AUTO: 0.22 THOUSAND/ΜL (ref 0–0.61)
EOSINOPHIL NFR BLD AUTO: 3 % (ref 0–6)
ERYTHROCYTE [DISTWIDTH] IN BLOOD BY AUTOMATED COUNT: 13.2 % (ref 11.6–15.1)
GFR SERPL CREATININE-BSD FRML MDRD: 84 ML/MIN/1.73SQ M
GLUCOSE P FAST SERPL-MCNC: 103 MG/DL (ref 65–99)
HCT VFR BLD AUTO: 37.7 % (ref 34.8–46.1)
HGB BLD-MCNC: 12.3 G/DL (ref 11.5–15.4)
IMM GRANULOCYTES # BLD AUTO: 0.02 THOUSAND/UL (ref 0–0.2)
IMM GRANULOCYTES NFR BLD AUTO: 0 % (ref 0–2)
LYMPHOCYTES # BLD AUTO: 2.54 THOUSANDS/ΜL (ref 0.6–4.47)
LYMPHOCYTES NFR BLD AUTO: 37 % (ref 14–44)
MCH RBC QN AUTO: 28.7 PG (ref 26.8–34.3)
MCHC RBC AUTO-ENTMCNC: 32.6 G/DL (ref 31.4–37.4)
MCV RBC AUTO: 88 FL (ref 82–98)
MONOCYTES # BLD AUTO: 0.66 THOUSAND/ΜL (ref 0.17–1.22)
MONOCYTES NFR BLD AUTO: 10 % (ref 4–12)
NEUTROPHILS # BLD AUTO: 3.33 THOUSANDS/ΜL (ref 1.85–7.62)
NEUTS SEG NFR BLD AUTO: 49 % (ref 43–75)
NRBC BLD AUTO-RTO: 0 /100 WBCS
PLATELET # BLD AUTO: 236 THOUSANDS/UL (ref 149–390)
PMV BLD AUTO: 11 FL (ref 8.9–12.7)
POTASSIUM SERPL-SCNC: 3.5 MMOL/L (ref 3.5–5.3)
PROT SERPL-MCNC: 8 G/DL (ref 6.4–8.2)
RBC # BLD AUTO: 4.28 MILLION/UL (ref 3.81–5.12)
SODIUM SERPL-SCNC: 139 MMOL/L (ref 136–145)
WBC # BLD AUTO: 6.81 THOUSAND/UL (ref 4.31–10.16)

## 2019-09-03 PROCEDURE — 93005 ELECTROCARDIOGRAM TRACING: CPT

## 2019-09-03 PROCEDURE — 85025 COMPLETE CBC W/AUTO DIFF WBC: CPT

## 2019-09-03 PROCEDURE — 80053 COMPREHEN METABOLIC PANEL: CPT

## 2019-09-03 PROCEDURE — 36415 COLL VENOUS BLD VENIPUNCTURE: CPT

## 2019-09-04 ENCOUNTER — ANESTHESIA EVENT (OUTPATIENT)
Dept: PERIOP | Facility: HOSPITAL | Age: 54
DRG: 363 | End: 2019-09-04
Payer: COMMERCIAL

## 2019-09-04 LAB
ATRIAL RATE: 79 BPM
P AXIS: -15 DEGREES
PR INTERVAL: 158 MS
QRS AXIS: -35 DEGREES
QRSD INTERVAL: 98 MS
QT INTERVAL: 388 MS
QTC INTERVAL: 444 MS
T WAVE AXIS: 6 DEGREES
VENTRICULAR RATE: 79 BPM

## 2019-09-04 PROCEDURE — 93010 ELECTROCARDIOGRAM REPORT: CPT | Performed by: INTERNAL MEDICINE

## 2019-09-05 ENCOUNTER — HOSPITAL ENCOUNTER (INPATIENT)
Facility: HOSPITAL | Age: 54
LOS: 3 days | Discharge: HOME | DRG: 363 | End: 2019-09-08
Attending: PLASTIC SURGERY | Admitting: PLASTIC SURGERY
Payer: COMMERCIAL

## 2019-09-05 ENCOUNTER — ANESTHESIA (OUTPATIENT)
Dept: PERIOP | Facility: HOSPITAL | Age: 54
DRG: 363 | End: 2019-09-05
Payer: COMMERCIAL

## 2019-09-05 DIAGNOSIS — C50.812 MALIGNANT NEOPLASM OF OVERLAPPING SITES OF LEFT BREAST IN FEMALE, ESTROGEN RECEPTOR POSITIVE (HCC): Primary | ICD-10-CM

## 2019-09-05 DIAGNOSIS — Z17.0 MALIGNANT NEOPLASM OF OVERLAPPING SITES OF LEFT BREAST IN FEMALE, ESTROGEN RECEPTOR POSITIVE (HCC): Primary | ICD-10-CM

## 2019-09-05 PROCEDURE — 0HRU076 REPLACEMENT OF LEFT BREAST USING TRANSVERSE RECTUS ABDOMINIS MYOCUTANEOUS FLAP, OPEN APPROACH: ICD-10-PCS | Performed by: PLASTIC SURGERY

## 2019-09-05 PROCEDURE — C1781 MESH (IMPLANTABLE): HCPCS | Performed by: PLASTIC SURGERY

## 2019-09-05 PROCEDURE — 88302 TISSUE EXAM BY PATHOLOGIST: CPT | Performed by: PATHOLOGY

## 2019-09-05 PROCEDURE — 94664 DEMO&/EVAL PT USE INHALER: CPT

## 2019-09-05 PROCEDURE — 97605 NEG PRS WND THER DME<=50SQCM: CPT | Performed by: PLASTIC SURGERY

## 2019-09-05 PROCEDURE — C9290 INJ, BUPIVACAINE LIPOSOME: HCPCS | Performed by: ANESTHESIOLOGY

## 2019-09-05 PROCEDURE — A6550 NEG PRES WOUND THER DRSG SET: HCPCS | Performed by: PLASTIC SURGERY

## 2019-09-05 PROCEDURE — 19364 BRST RCNSTJ FREE FLAP: CPT | Performed by: PLASTIC SURGERY

## 2019-09-05 PROCEDURE — 94760 N-INVAS EAR/PLS OXIMETRY 1: CPT

## 2019-09-05 PROCEDURE — 99024 POSTOP FOLLOW-UP VISIT: CPT | Performed by: PHYSICIAN ASSISTANT

## 2019-09-05 DEVICE — THE FLOWCOUPLER SYSTEM CONSISTS OF A FLOWCOUPLER DEVICE AND A FLOWCOUPLER MONITOR. THE FLOWCOUPLER DEVICE INCLUDES A 20MHZ ULTRASONIC DOPPLER TRANSDUCER (PROBE) ATTACHED TO ONE OF THE FLOWCOUPLER RINGS, AND AN EXTERNAL LEAD. THE FLOWCOUPLER RINGS ARE MADE OF HIGH DENSITY POLYETHYLENE AND SURGICAL GRADE STAINLESS STEEL PINS. A PROTECTIVE COVER AND JAW ASSEMBLY PROTECT THE RINGS AND PROBE WHICH ALLOW FOR EASY LOADING ONTO THE ANASTOMOTIC INSTRUMENT. BOTH THE PROTECTIVE COVER AND JAW ASSEMBLY ARE DISPOSABLE.
Type: IMPLANTABLE DEVICE | Site: ABDOMEN | Status: FUNCTIONAL
Brand: GEM FLOW COUPLER

## 2019-09-05 DEVICE — PHASIX MESH, 6" X 8" (15.2 CM X 20.3 CM), RECTANGLE
Type: IMPLANTABLE DEVICE | Site: ABDOMEN | Status: FUNCTIONAL
Brand: PHASIX

## 2019-09-05 RX ORDER — METOPROLOL TARTRATE 50 MG/1
50 TABLET, FILM COATED ORAL DAILY
Status: DISCONTINUED | OUTPATIENT
Start: 2019-09-06 | End: 2019-09-08 | Stop reason: HOSPADM

## 2019-09-05 RX ORDER — ASPIRIN 325 MG
325 TABLET ORAL DAILY
Status: DISCONTINUED | OUTPATIENT
Start: 2019-09-06 | End: 2019-09-05

## 2019-09-05 RX ORDER — AMLODIPINE BESYLATE 5 MG/1
5 TABLET ORAL DAILY
Status: DISCONTINUED | OUTPATIENT
Start: 2019-09-06 | End: 2019-09-08 | Stop reason: HOSPADM

## 2019-09-05 RX ORDER — BUPIVACAINE HYDROCHLORIDE 2.5 MG/ML
INJECTION, SOLUTION INFILTRATION; PERINEURAL
Status: COMPLETED | OUTPATIENT
Start: 2019-09-05 | End: 2019-09-05

## 2019-09-05 RX ORDER — FENTANYL CITRATE 50 UG/ML
INJECTION, SOLUTION INTRAMUSCULAR; INTRAVENOUS AS NEEDED
Status: DISCONTINUED | OUTPATIENT
Start: 2019-09-05 | End: 2019-09-05 | Stop reason: SURG

## 2019-09-05 RX ORDER — ACETAMINOPHEN 325 MG/1
975 TABLET ORAL EVERY 8 HOURS
Status: DISCONTINUED | OUTPATIENT
Start: 2019-09-05 | End: 2019-09-08 | Stop reason: HOSPADM

## 2019-09-05 RX ORDER — MEPERIDINE HYDROCHLORIDE 25 MG/ML
12.5 INJECTION INTRAMUSCULAR; INTRAVENOUS; SUBCUTANEOUS
Status: DISCONTINUED | OUTPATIENT
Start: 2019-09-05 | End: 2019-09-05 | Stop reason: HOSPADM

## 2019-09-05 RX ORDER — SODIUM CHLORIDE 9 MG/ML
INJECTION, SOLUTION INTRAVENOUS CONTINUOUS PRN
Status: DISCONTINUED | OUTPATIENT
Start: 2019-09-05 | End: 2019-09-05 | Stop reason: SURG

## 2019-09-05 RX ORDER — HYDROMORPHONE HCL/PF 1 MG/ML
0.5 SYRINGE (ML) INJECTION EVERY 4 HOURS PRN
Status: DISCONTINUED | OUTPATIENT
Start: 2019-09-05 | End: 2019-09-08 | Stop reason: HOSPADM

## 2019-09-05 RX ORDER — ZOLPIDEM TARTRATE 5 MG/1
5 TABLET ORAL
Status: DISCONTINUED | OUTPATIENT
Start: 2019-09-05 | End: 2019-09-08 | Stop reason: HOSPADM

## 2019-09-05 RX ORDER — CEFAZOLIN SODIUM 1 G/50ML
1000 SOLUTION INTRAVENOUS EVERY 8 HOURS
Status: COMPLETED | OUTPATIENT
Start: 2019-09-05 | End: 2019-09-06

## 2019-09-05 RX ORDER — PROPOFOL 10 MG/ML
INJECTION, EMULSION INTRAVENOUS CONTINUOUS PRN
Status: DISCONTINUED | OUTPATIENT
Start: 2019-09-05 | End: 2019-09-05 | Stop reason: SURG

## 2019-09-05 RX ORDER — MIDAZOLAM HYDROCHLORIDE 1 MG/ML
INJECTION INTRAMUSCULAR; INTRAVENOUS AS NEEDED
Status: DISCONTINUED | OUTPATIENT
Start: 2019-09-05 | End: 2019-09-05 | Stop reason: SURG

## 2019-09-05 RX ORDER — FENTANYL CITRATE/PF 50 MCG/ML
50 SYRINGE (ML) INJECTION
Status: DISCONTINUED | OUTPATIENT
Start: 2019-09-05 | End: 2019-09-05 | Stop reason: HOSPADM

## 2019-09-05 RX ORDER — NEOSTIGMINE METHYLSULFATE 1 MG/ML
INJECTION INTRAVENOUS AS NEEDED
Status: DISCONTINUED | OUTPATIENT
Start: 2019-09-05 | End: 2019-09-05 | Stop reason: SURG

## 2019-09-05 RX ORDER — DIPHENHYDRAMINE HCL 25 MG
25 TABLET ORAL EVERY 6 HOURS PRN
Status: DISCONTINUED | OUTPATIENT
Start: 2019-09-05 | End: 2019-09-08 | Stop reason: HOSPADM

## 2019-09-05 RX ORDER — SODIUM CHLORIDE, SODIUM LACTATE, POTASSIUM CHLORIDE, CALCIUM CHLORIDE 600; 310; 30; 20 MG/100ML; MG/100ML; MG/100ML; MG/100ML
50 INJECTION, SOLUTION INTRAVENOUS CONTINUOUS
Status: DISCONTINUED | OUTPATIENT
Start: 2019-09-05 | End: 2019-09-05

## 2019-09-05 RX ORDER — CEFAZOLIN SODIUM 1 G/50ML
1000 SOLUTION INTRAVENOUS ONCE
Status: COMPLETED | OUTPATIENT
Start: 2019-09-05 | End: 2019-09-05

## 2019-09-05 RX ORDER — OXYCODONE HYDROCHLORIDE 5 MG/1
5 TABLET ORAL EVERY 4 HOURS PRN
Status: DISCONTINUED | OUTPATIENT
Start: 2019-09-05 | End: 2019-09-08 | Stop reason: HOSPADM

## 2019-09-05 RX ORDER — HEPARIN SODIUM 5000 [USP'U]/ML
5000 INJECTION, SOLUTION INTRAVENOUS; SUBCUTANEOUS EVERY 8 HOURS SCHEDULED
Status: DISCONTINUED | OUTPATIENT
Start: 2019-09-05 | End: 2019-09-05

## 2019-09-05 RX ORDER — ALBUMIN, HUMAN INJ 5% 5 %
SOLUTION INTRAVENOUS CONTINUOUS PRN
Status: DISCONTINUED | OUTPATIENT
Start: 2019-09-05 | End: 2019-09-05 | Stop reason: SURG

## 2019-09-05 RX ORDER — PROPOFOL 10 MG/ML
INJECTION, EMULSION INTRAVENOUS AS NEEDED
Status: DISCONTINUED | OUTPATIENT
Start: 2019-09-05 | End: 2019-09-05 | Stop reason: SURG

## 2019-09-05 RX ORDER — OXYCODONE HYDROCHLORIDE 5 MG/1
10 TABLET ORAL EVERY 4 HOURS PRN
Status: DISCONTINUED | OUTPATIENT
Start: 2019-09-05 | End: 2019-09-05

## 2019-09-05 RX ORDER — ROCURONIUM BROMIDE 10 MG/ML
INJECTION, SOLUTION INTRAVENOUS AS NEEDED
Status: DISCONTINUED | OUTPATIENT
Start: 2019-09-05 | End: 2019-09-05 | Stop reason: SURG

## 2019-09-05 RX ORDER — HYDROMORPHONE HCL/PF 1 MG/ML
0.5 SYRINGE (ML) INJECTION
Status: DISCONTINUED | OUTPATIENT
Start: 2019-09-05 | End: 2019-09-05 | Stop reason: HOSPADM

## 2019-09-05 RX ORDER — GABAPENTIN 300 MG/1
300 CAPSULE ORAL ONCE
Status: COMPLETED | OUTPATIENT
Start: 2019-09-05 | End: 2019-09-05

## 2019-09-05 RX ORDER — PROMETHAZINE HYDROCHLORIDE 25 MG/ML
25 INJECTION, SOLUTION INTRAMUSCULAR; INTRAVENOUS ONCE AS NEEDED
Status: DISCONTINUED | OUTPATIENT
Start: 2019-09-05 | End: 2019-09-05 | Stop reason: HOSPADM

## 2019-09-05 RX ORDER — ACETAMINOPHEN 325 MG/1
975 TABLET ORAL ONCE
Status: COMPLETED | OUTPATIENT
Start: 2019-09-05 | End: 2019-09-05

## 2019-09-05 RX ORDER — ONDANSETRON 2 MG/ML
INJECTION INTRAMUSCULAR; INTRAVENOUS AS NEEDED
Status: DISCONTINUED | OUTPATIENT
Start: 2019-09-05 | End: 2019-09-05 | Stop reason: SURG

## 2019-09-05 RX ORDER — SODIUM CHLORIDE, SODIUM LACTATE, POTASSIUM CHLORIDE, CALCIUM CHLORIDE 600; 310; 30; 20 MG/100ML; MG/100ML; MG/100ML; MG/100ML
100 INJECTION, SOLUTION INTRAVENOUS CONTINUOUS
Status: DISCONTINUED | OUTPATIENT
Start: 2019-09-05 | End: 2019-09-05

## 2019-09-05 RX ORDER — GABAPENTIN 300 MG/1
300 CAPSULE ORAL 3 TIMES DAILY
Status: DISCONTINUED | OUTPATIENT
Start: 2019-09-05 | End: 2019-09-08 | Stop reason: HOSPADM

## 2019-09-05 RX ORDER — ALBUTEROL SULFATE 90 UG/1
2 AEROSOL, METERED RESPIRATORY (INHALATION) EVERY 4 HOURS PRN
Status: DISCONTINUED | OUTPATIENT
Start: 2019-09-05 | End: 2019-09-08 | Stop reason: HOSPADM

## 2019-09-05 RX ORDER — GLYCOPYRROLATE 0.2 MG/ML
INJECTION INTRAMUSCULAR; INTRAVENOUS AS NEEDED
Status: DISCONTINUED | OUTPATIENT
Start: 2019-09-05 | End: 2019-09-05 | Stop reason: SURG

## 2019-09-05 RX ORDER — IBUPROFEN 400 MG/1
800 TABLET ORAL EVERY 8 HOURS SCHEDULED
Status: DISCONTINUED | OUTPATIENT
Start: 2019-09-05 | End: 2019-09-08 | Stop reason: HOSPADM

## 2019-09-05 RX ORDER — SODIUM CHLORIDE, SODIUM LACTATE, POTASSIUM CHLORIDE, CALCIUM CHLORIDE 600; 310; 30; 20 MG/100ML; MG/100ML; MG/100ML; MG/100ML
125 INJECTION, SOLUTION INTRAVENOUS CONTINUOUS
Status: DISCONTINUED | OUTPATIENT
Start: 2019-09-05 | End: 2019-09-05

## 2019-09-05 RX ORDER — ASPIRIN 325 MG
325 TABLET ORAL DAILY
Status: DISCONTINUED | OUTPATIENT
Start: 2019-09-06 | End: 2019-09-08 | Stop reason: HOSPADM

## 2019-09-05 RX ORDER — ONDANSETRON 2 MG/ML
4 INJECTION INTRAMUSCULAR; INTRAVENOUS EVERY 6 HOURS PRN
Status: DISCONTINUED | OUTPATIENT
Start: 2019-09-05 | End: 2019-09-08 | Stop reason: HOSPADM

## 2019-09-05 RX ORDER — DEXAMETHASONE SODIUM PHOSPHATE 4 MG/ML
INJECTION, SOLUTION INTRA-ARTICULAR; INTRALESIONAL; INTRAMUSCULAR; INTRAVENOUS; SOFT TISSUE AS NEEDED
Status: DISCONTINUED | OUTPATIENT
Start: 2019-09-05 | End: 2019-09-05 | Stop reason: SURG

## 2019-09-05 RX ORDER — LIDOCAINE HYDROCHLORIDE 10 MG/ML
INJECTION, SOLUTION INFILTRATION; PERINEURAL AS NEEDED
Status: DISCONTINUED | OUTPATIENT
Start: 2019-09-05 | End: 2019-09-05 | Stop reason: SURG

## 2019-09-05 RX ORDER — INDOCYANINE GREEN AND WATER 25 MG
KIT INJECTION AS NEEDED
Status: DISCONTINUED | OUTPATIENT
Start: 2019-09-05 | End: 2019-09-05 | Stop reason: SURG

## 2019-09-05 RX ORDER — SODIUM CHLORIDE, SODIUM LACTATE, POTASSIUM CHLORIDE, CALCIUM CHLORIDE 600; 310; 30; 20 MG/100ML; MG/100ML; MG/100ML; MG/100ML
75 INJECTION, SOLUTION INTRAVENOUS CONTINUOUS
Status: DISCONTINUED | OUTPATIENT
Start: 2019-09-05 | End: 2019-09-08 | Stop reason: HOSPADM

## 2019-09-05 RX ORDER — HYDROMORPHONE HCL/PF 1 MG/ML
0.5 SYRINGE (ML) INJECTION
Status: DISCONTINUED | OUTPATIENT
Start: 2019-09-05 | End: 2019-09-08 | Stop reason: HOSPADM

## 2019-09-05 RX ORDER — HEPARIN SODIUM 5000 [USP'U]/ML
5000 INJECTION, SOLUTION INTRAVENOUS; SUBCUTANEOUS ONCE
Status: COMPLETED | OUTPATIENT
Start: 2019-09-05 | End: 2019-09-05

## 2019-09-05 RX ADMIN — GABAPENTIN 300 MG: 300 CAPSULE ORAL at 07:03

## 2019-09-05 RX ADMIN — BUPIVACAINE HYDROCHLORIDE 40 ML: 2.5 INJECTION, SOLUTION INFILTRATION; PERINEURAL at 08:11

## 2019-09-05 RX ADMIN — FENTANYL CITRATE 25 MCG: 50 INJECTION INTRAMUSCULAR; INTRAVENOUS at 12:02

## 2019-09-05 RX ADMIN — DEXAMETHASONE SODIUM PHOSPHATE 8 MG: 4 INJECTION, SOLUTION INTRAMUSCULAR; INTRAVENOUS at 08:39

## 2019-09-05 RX ADMIN — GLYCOPYRROLATE 0.4 MG: 0.2 INJECTION, SOLUTION INTRAMUSCULAR; INTRAVENOUS at 16:10

## 2019-09-05 RX ADMIN — INDOCYANINE GREEN AND WATER 12.5 MG: KIT at 14:52

## 2019-09-05 RX ADMIN — ONDANSETRON 8 MG: 2 INJECTION INTRAMUSCULAR; INTRAVENOUS at 15:46

## 2019-09-05 RX ADMIN — BUPIVACAINE 10 ML: 13.3 INJECTION, SUSPENSION, LIPOSOMAL INFILTRATION at 08:11

## 2019-09-05 RX ADMIN — PROPOFOL 150 MG: 10 INJECTION, EMULSION INTRAVENOUS at 07:57

## 2019-09-05 RX ADMIN — ROCURONIUM BROMIDE 10 MG: 10 SOLUTION INTRAVENOUS at 09:44

## 2019-09-05 RX ADMIN — FENTANYL CITRATE 50 MCG: 50 INJECTION INTRAMUSCULAR; INTRAVENOUS at 08:46

## 2019-09-05 RX ADMIN — KETAMINE HYDROCHLORIDE 0.15 MG/KG/HR: 50 INJECTION INTRAMUSCULAR; INTRAVENOUS at 08:00

## 2019-09-05 RX ADMIN — ROCURONIUM BROMIDE 10 MG: 10 SOLUTION INTRAVENOUS at 10:14

## 2019-09-05 RX ADMIN — ROCURONIUM BROMIDE 10 MG: 10 SOLUTION INTRAVENOUS at 11:35

## 2019-09-05 RX ADMIN — ACETAMINOPHEN 975 MG: 325 TABLET ORAL at 07:04

## 2019-09-05 RX ADMIN — FENTANYL CITRATE 100 MCG: 50 INJECTION INTRAMUSCULAR; INTRAVENOUS at 15:42

## 2019-09-05 RX ADMIN — CEFAZOLIN SODIUM 1000 MG: 1 SOLUTION INTRAVENOUS at 08:25

## 2019-09-05 RX ADMIN — ROCURONIUM BROMIDE 10 MG: 10 SOLUTION INTRAVENOUS at 11:59

## 2019-09-05 RX ADMIN — OXYCODONE HYDROCHLORIDE 5 MG: 5 TABLET ORAL at 21:21

## 2019-09-05 RX ADMIN — ROCURONIUM BROMIDE 10 MG: 10 SOLUTION INTRAVENOUS at 08:57

## 2019-09-05 RX ADMIN — KETAMINE HYDROCHLORIDE 0.15 MG/KG/HR: 50 INJECTION INTRAMUSCULAR; INTRAVENOUS at 08:43

## 2019-09-05 RX ADMIN — SODIUM CHLORIDE, SODIUM LACTATE, POTASSIUM CHLORIDE, AND CALCIUM CHLORIDE 50 ML/HR: .6; .31; .03; .02 INJECTION, SOLUTION INTRAVENOUS at 18:08

## 2019-09-05 RX ADMIN — MIDAZOLAM HYDROCHLORIDE 2 MG: 1 INJECTION, SOLUTION INTRAMUSCULAR; INTRAVENOUS at 07:53

## 2019-09-05 RX ADMIN — CEFAZOLIN SODIUM 1000 MG: 1 SOLUTION INTRAVENOUS at 21:22

## 2019-09-05 RX ADMIN — FENTANYL CITRATE 25 MCG: 50 INJECTION INTRAMUSCULAR; INTRAVENOUS at 12:06

## 2019-09-05 RX ADMIN — ROCURONIUM BROMIDE 10 MG: 10 SOLUTION INTRAVENOUS at 10:54

## 2019-09-05 RX ADMIN — NEOSTIGMINE METHYLSULFATE 3 MG: 1 INJECTION INTRAVENOUS at 16:10

## 2019-09-05 RX ADMIN — ROCURONIUM BROMIDE 10 MG: 10 SOLUTION INTRAVENOUS at 13:31

## 2019-09-05 RX ADMIN — SODIUM CHLORIDE: 0.9 INJECTION, SOLUTION INTRAVENOUS at 10:07

## 2019-09-05 RX ADMIN — GENTAMICIN SULFATE: 40 INJECTION, SOLUTION INTRAMUSCULAR; INTRAVENOUS at 18:18

## 2019-09-05 RX ADMIN — FENTANYL CITRATE 50 MCG: 50 INJECTION INTRAMUSCULAR; INTRAVENOUS at 11:45

## 2019-09-05 RX ADMIN — SODIUM CHLORIDE, SODIUM LACTATE, POTASSIUM CHLORIDE, AND CALCIUM CHLORIDE 75 ML/HR: .6; .31; .03; .02 INJECTION, SOLUTION INTRAVENOUS at 18:11

## 2019-09-05 RX ADMIN — LIDOCAINE HYDROCHLORIDE 50 MG: 10 INJECTION, SOLUTION INFILTRATION; PERINEURAL at 07:57

## 2019-09-05 RX ADMIN — ROCURONIUM BROMIDE 10 MG: 10 SOLUTION INTRAVENOUS at 09:16

## 2019-09-05 RX ADMIN — SODIUM CHLORIDE, SODIUM LACTATE, POTASSIUM CHLORIDE, AND CALCIUM CHLORIDE 125 ML/HR: .6; .31; .03; .02 INJECTION, SOLUTION INTRAVENOUS at 07:07

## 2019-09-05 RX ADMIN — IBUPROFEN 800 MG: 400 TABLET ORAL at 21:21

## 2019-09-05 RX ADMIN — BUPIVACAINE HYDROCHLORIDE 20 ML: 2.5 INJECTION, SOLUTION INFILTRATION; PERINEURAL at 08:06

## 2019-09-05 RX ADMIN — ROCURONIUM BROMIDE 10 MG: 10 SOLUTION INTRAVENOUS at 13:00

## 2019-09-05 RX ADMIN — HYDROMORPHONE HYDROCHLORIDE 0.5 MG: 1 INJECTION, SOLUTION INTRAMUSCULAR; INTRAVENOUS; SUBCUTANEOUS at 18:28

## 2019-09-05 RX ADMIN — BUPIVACAINE 10 ML: 13.3 INJECTION, SUSPENSION, LIPOSOMAL INFILTRATION at 08:06

## 2019-09-05 RX ADMIN — FENTANYL CITRATE 50 MCG: 50 INJECTION INTRAMUSCULAR; INTRAVENOUS at 07:57

## 2019-09-05 RX ADMIN — PROPOFOL 100 MCG/KG/MIN: 10 INJECTION, EMULSION INTRAVENOUS at 08:00

## 2019-09-05 RX ADMIN — ROCURONIUM BROMIDE 10 MG: 10 SOLUTION INTRAVENOUS at 14:26

## 2019-09-05 RX ADMIN — CEFAZOLIN SODIUM 1000 MG: 1 SOLUTION INTRAVENOUS at 12:25

## 2019-09-05 RX ADMIN — SODIUM CHLORIDE, SODIUM LACTATE, POTASSIUM CHLORIDE, AND CALCIUM CHLORIDE: .6; .31; .03; .02 INJECTION, SOLUTION INTRAVENOUS at 15:49

## 2019-09-05 RX ADMIN — GABAPENTIN 300 MG: 300 CAPSULE ORAL at 21:22

## 2019-09-05 RX ADMIN — ROCURONIUM BROMIDE 50 MG: 10 SOLUTION INTRAVENOUS at 07:57

## 2019-09-05 RX ADMIN — PHENYLEPHRINE HYDROCHLORIDE 100 MCG: 10 INJECTION INTRAVENOUS at 12:19

## 2019-09-05 RX ADMIN — ALBUMIN (HUMAN): 12.5 SOLUTION INTRAVENOUS at 14:00

## 2019-09-05 RX ADMIN — HEPARIN SODIUM 5000 UNITS: 5000 INJECTION INTRAVENOUS; SUBCUTANEOUS at 07:07

## 2019-09-05 NOTE — INTERVAL H&P NOTE
H&P reviewed  After examining the patient I find no changes in the patients condition since the H&P had been written      Vitals:    09/05/19 0700   BP: 147/83   Pulse: 90   Resp: 18   Temp: 98 3 °F (36 8 °C)   SpO2: 99%

## 2019-09-05 NOTE — NURSING NOTE
Left breast is cool to touch normal color scant serosanguineous drainage  Incision site is approximated  Flow  ultrasound present  Tissue ox 60%   Signal quality 98%

## 2019-09-05 NOTE — ANESTHESIA PROCEDURE NOTES
Peripheral Block    Patient location during procedure: OR  Start time: 9/5/2019 8:11 AM  Reason for block: at surgeon's request and post-op pain management  Staffing  Anesthesiologist: Landon Art MD  Performed: anesthesiologist   Preanesthetic Checklist  Completed: patient identified, site marked, surgical consent, pre-op evaluation, timeout performed, IV checked, risks and benefits discussed and monitors and equipment checked  Peripheral Block  Patient position: supine  Prep: ChloraPrep  Patient monitoring: heart rate, cardiac monitor, continuous pulse ox and frequent blood pressure checks  Block type: TAP  Laterality: left  Injection technique: single-shot  Procedures: ultrasound guided, Ultrasound guidance required for the procedure to increase accuracy and safety of medication placement and decrease risk of complications    Ultrasound permanent image savedbupivacaine (MARCAINE) 0 25 % perineural infiltration, 40 mL  Needle  Needle type: Stimuplex   Needle gauge: 22 G  Needle length: 10 cm  Needle localization: ultrasound guidance  Needle insertion depth: 4 cm  Assessment  Injection assessment: incremental injection and negative aspiration for heme  Paresthesia pain: none  Heart rate change: no  Slow fractionated injection: yes  Post-procedure:  site cleaned  patient tolerated the procedure well with no immediate complications

## 2019-09-05 NOTE — RESPIRATORY THERAPY NOTE
RT Protocol Note  Deannie Payment 49 y o  female MRN: [id number]  Unit/Bed#:  Encounter: [id number]    Assessment    Principal Problem:    Malignant neoplasm of overlapping sites of left breast in female, estrogen receptor positive ([facility] )      Home Pulmonary Medications:  Albuterol MDI       Past Medical History:   Diagnosis Date    Anxiety     Asthma     Breast cancer ([facility] )     left breast chemo only     Depression     History of chemotherapy     Hypertension     Insomnia      Social History     Socioeconomic History    Marital status: Single     Spouse name: None    Number of children: None    Years of education: None    Highest education level: None   Occupational History    None   Social Needs    Financial resource strain: None    Food insecurity:     Worry: None     Inability: None    Transportation needs:     Medical: None     Non-medical: None   Tobacco Use    Smoking status: Never Smoker    Smokeless tobacco: Never Used   Substance and Sexual Activity    Alcohol use: No    Drug use: No    Sexual activity: None   Lifestyle    Physical activity:     Days per week: None     Minutes per session: None    Stress: None   Relationships    Social connections:     Talks on phone: None     Gets together: None     Attends [employer] service: None     Active member of club or organization: None     Attends meetings of clubs or organizations: None     Relationship status: None    Intimate partner violence:     Fear of current or ex partner: None     Emotionally abused: None     Physically abused: None     Forced sexual activity: None   Other Topics Concern    None   Social History Narrative    None       Subjective         Objective    Physical Exam:   Assessment Type: Assess only  General Appearance: Awake, Alert  Respiratory Pattern: Normal  Chest Assessment: Chest expansion symmetrical  Bilateral Breath Sounds: Diminished  Cough: None  O2 Device: None    Vitals:  Blood pressure 121/64, pulse 96, temperature 98 2 °F (36 8 °C), temperature source Probe, resp  rate 12, height 4' 11" (1 499 m), weight 59 1 kg (130 lb 4 7 oz), SpO2 96 %  Imaging and other studies: I have personally reviewed pertinent reports        O2 Device: None     Plan    Respiratory Plan: Home Bronchodilator Patient pathway        Resp Comments: Contniue bronchodilator therapy as ordered by physician

## 2019-09-05 NOTE — ANESTHESIA PROCEDURE NOTES
Peripheral Block    Patient location during procedure: OR  Start time: 9/5/2019 8:06 AM  Reason for block: at surgeon's request and post-op pain management  Staffing  Anesthesiologist: Taylor Ding MD  Performed: anesthesiologist   Preanesthetic Checklist  Completed: patient identified, site marked, surgical consent, pre-op evaluation, timeout performed, IV checked, risks and benefits discussed and monitors and equipment checked  Peripheral Block  Patient position: supine  Prep: ChloraPrep  Patient monitoring: heart rate, cardiac monitor, continuous pulse ox and frequent blood pressure checks  Anesthesia block type: PECS2  Laterality: left  Injection technique: single-shot  Procedures: ultrasound guided, Ultrasound guidance required for the procedure to increase accuracy and safety of medication placement and decrease risk of complications    Ultrasound permanent image savedbupivacaine (MARCAINE) 0 25 % perineural infiltration, 20 mL  Needle  Needle type: Stimuplex   Needle gauge: 22 G  Needle length: 10 cm  Needle localization: ultrasound guidance  Needle insertion depth: 4 cm  Assessment  Injection assessment: incremental injection and no paresthesia on injection  Paresthesia pain: none  Heart rate change: no  Slow fractionated injection: yes  Post-procedure:  site cleaned  patient tolerated the procedure well with no immediate complications

## 2019-09-05 NOTE — PROGRESS NOTES
Post Op Check Note -Surgery RAS Contreras 49 y o  female MRN: [id number]  Unit/Bed#:  Encounter: [id number]    ASSESSMENT/PLAN:  Problem List     * (Principal) Malignant neoplasm of overlapping sites of left breast in female, estrogen receptor positive ([address] )    Essential hypertension    Asthma    Anxiety    Insomnia    Dyslipidemia   50 yo F s/p MARU  Doing well and pain controlled  · Pain control PRN  · Monitor flap as ordered  · OOB and ambulate  · SCDs ordered  · Will order Zofran as PRN       Subjective/Objective     Subjective:   Some pain   No other complaints   Had nausea earlier but this has resolved    Objective/Physical Exam:   Blood pressure 121/64, pulse 96, temperature 98 2 °F (36 8 °C), temperature source Probe, resp  rate 12, height 4' 11" (1 499 m), weight 59 1 kg (130 lb 4 7 oz), SpO2 96 %  ,Body mass index is 26 32 kg/m²      General appearance: alert and oriented, in no acute distress  Chest: flap pink and sofr, good capillary refill, PEACE drain   Abdomen: Prevena in place, abd flat    Current Facility-Administered Medications:     acetaminophen (TYLENOL) tablet 975 mg, 975 mg, Oral, Q8H, Leif Mcneal MD, Stopped at 09/05/19 1818    albuterol (PROVENTIL HFA,VENTOLIN HFA) inhaler 2 puff, 2 puff, Inhalation, Q4H PRN, Inocencio Bloch, MD    [START ON 9/6/2019] amLODIPine (NORVASC) tablet 5 mg, 5 mg, Oral, Daily, Inocencio Bloch, MD    [START ON 9/6/2019] aspirin tablet 325 mg, 325 mg, Oral, Daily, Kinza Adams MD    ceFAZolin (ANCEF) IVPB (premix) 1,000 mg, 1,000 mg, Intravenous, Q8H, Leif Mcneal MD    diphenhydrAMINE (BENADRYL) tablet 25 mg, 25 mg, Oral, Q6H PRN, Inocencio Bloch, MD    [START ON 9/6/2019] enoxaparin (LOVENOX) subcutaneous injection 40 mg, 40 mg, Subcutaneous, Daily, Leif Mcneal MD    gabapentin (NEURONTIN) capsule 300 mg, 300 mg, Oral, TID, Inocencio Bloch, MD, Stopped at 09/05/19 1824    heparin (porcine) 50,000 Units in lactated Ringer's 500 mL OR irrigation, , Irrigation, Once, Fely Mckeon MD    HYDROmorphone (DILAUDID) injection 0 5 mg, 0 5 mg, Intravenous, Q4H PRN, Marialuisa Noe MD, 0 5 mg at 09/05/19 1828    HYDROmorphone (DILAUDID) injection 0 5 mg, 0 5 mg, Intravenous, Q3H PRN, Fely Mckeon MD    ibuprofen (MOTRIN) tablet 800 mg, 800 mg, Oral, Q8H [facility], Marialuisa Noe MD, Stopped at 09/05/19 1831    lactated ringers infusion, 75 mL/hr, Intravenous, Continuous, Leif Mcneal MD, Last Rate: 75 mL/hr at 09/05/19 1811, 75 mL/hr at 09/05/19 1811    [START ON 9/6/2019] lisinopril-hydrochlorothiazide (PRINZIDE 20/12  5) combo dose, , Oral, Daily, Marialuisa Noe MD    [START ON 9/6/2019] metoprolol tartrate (LOPRESSOR) tablet 50 mg, 50 mg, Oral, Daily, Marialuisa Noe MD    nitroglycerin (NITRO-BID) 2 % TD ointment 1 inch, 1 inch, Topical, Once, Fely Mckeon MD    oxyCODONE (ROXICODONE) IR tablet 5 mg, 5 mg, Oral, Q4H PRN, Marialuisa Noe MD    papaverine 60 mg in sodium chloride 0 9 % 30 mL OR irrigation, , Irrigation, Once, Fely Mckeon MD    zolpidem [facility]) tablet 5 mg, 5 mg, Oral, HS PRN, Marialuisa Noe MD      VTE Pharmacologic Prophylaxis: Sequential compression device Stormy Sarah)

## 2019-09-05 NOTE — ANESTHESIA PREPROCEDURE EVALUATION
Review of Systems/Medical History          Cardiovascular  Hypertension ,    Pulmonary  Pneumonia, Asthma ,        GI/Hepatic            Endo/Other     GYN    Breast cancer        Hematology  Anemia ,     Musculoskeletal       Neurology   Psychology   Anxiety, Depression ,              Physical Exam    Airway    Mallampati score: II         Dental   No notable dental hx     Cardiovascular      Pulmonary      Other Findings        Anesthesia Plan  ASA Score- 2     Anesthesia Type- general with ASA Monitors  Additional Monitors: arterial line  Airway Plan: ETT  Comment: I, Dr Yolette Alonzo, the attending physician, have personally seen and evaluated the patient prior to anesthetic care  I have reviewed the pre-anesthetic record, and other medical records if appropriate to the anesthetic care  If a CRNA is involved in the case, I have reviewed the CRNA assessment, if present, and agree  The patient is in a suitable condition to proceed with my formulated anesthetic plan        Plan Factors-    Induction- intravenous  Postoperative Plan-     Informed Consent- Anesthetic plan and risks discussed with patient  I personally reviewed this patient with the CRNA  Discussed and agreed on the Anesthesia Plan with the CRNA  Lisa Jarvis

## 2019-09-05 NOTE — ANESTHESIA PROCEDURE NOTES
Arterial Line Insertion  Performed by: Frank Ruiz MD  Authorized by: Frank Ruiz MD   Consent: Verbal consent obtained  Written consent not obtained  Risks and benefits: risks, benefits and alternatives were discussed  Consent given by: patient  Patient identity confirmed: verbally with patient and arm band  Preparation: Patient was prepped and draped in the usual sterile fashion    Indications: hemodynamic monitoring  Orientation:  Right  Location: radial artery  Procedure Details:  Needle gauge: 20  Number of attempts: 1    Post-procedure:  Post-procedure: dressing applied  Waveform: good waveform

## 2019-09-05 NOTE — OP NOTE
OPERATIVE REPORT  PATIENT NAME: Guy Castellanos    :  1965  MRN: [id number]  Pt Location: AN OR ROOM 01    SURGERY DATE: 2019    Surgeon(s) and Role:     * Deonna Hazel MD - Primary     * RAS Marquez-C - [address] Mooney MD - Assisting    Preop Diagnosis:  Malignant neoplasm of overlapping sites of left breast in female, estrogen receptor positive ([facility] [address] ) [C50 812, Z17 0]    Post-Op Diagnosis Codes:     * Malignant neoplasm of overlapping sites of left breast in female, estrogen receptor positive ([facility] [address] ) [C50 812, Z17 0]    Procedure(s) (LRB):  BREAST RECONSTRUCTION W/FLAPMUSCLE SPARING TRAM FLAP   MESH PLACEMENT (Left)  INTRAOPERATIVE ICG ANGIOGRAPHY TO EVALUATE FLAP PERFUSION   APPLICATION OF NEGATIVE PRESSURE DRESSING  Specimen(s):  ID Type Source Tests Collected by Time Destination   1 : LEFT breast skin Tissue Breast, Left TISSUE EXAM Deonna Hazel MD 2019  3:10 PM        Estimated Blood Loss:   200 mL    Drains:  Closed/Suction Drain Right Abdomen Bulb 15 Fr  (Active)   Number of days: 0       Closed/Suction Drain Left Abdomen Bulb 15 Fr  (Active)   Number of days: 0       Closed/Suction Drain Left Breast Bulb 15 Fr  (Active)   Number of days: 0       Negative Pressure Wound Therapy (V A C ) Abdomen (Active)   Target Pressure (mmHg) 125 2019  4:00 PM   Dressing Status Clean;Dry; Intact 2019 12:00 AM   Number of days: 0       NG/OG/Enteral Tube Orogastric 16 Fr Center mouth (Active)   Number of days: 0       Urethral Catheter Temperature probe 16 Fr  (Active)   Number of days: 0       Anesthesia Type:   General    Operative Indications:  Malignant neoplasm of overlapping sites of left breast in female, estrogen receptor positive ([facility] [address] ) [C50 812, Z17 0]    Statement of Medical Necessity:     The patient is a very pleasant 49years old female who has the above diagnosis  She was counselled extensively about breast reconstructive options   She has elected to undergo delayed reconstruction with free tissue transfer from the abdomen and was counseled regarding the risks, benefits, rationale and alternatives to this procedure  We have reviewed the incisions/scars, duration, recovery time, postoperative restrictions, and follow-up, drains, and donor site morbidity  Risks that were discussed include surgical (bleeding, infection, hematoma, seroma, wound breakdown, need for further surgery, pain, numbness, weakness, asymmetry, cosmetic deformity, injury to structures) and medical (heart attack, pneumonia, DVT/PE, death)  We also have discussed partial/complete flap loss  The possible need for a contralateral balancing procedure or revision to the reconstructed breast or donor sites were discussed  All of her questions were answered preoperatively, and she wished to proceed  Operative Findings:  Small mammary vein vessels  Complications:   None    Procedure and Technique:  The patient was met in the preoperative holding area, and appropriate the presurgical marks were placed in the standing position  The patient was then brought back to the operating room and after uneventful induction of general anesthesia, the patient was prepped and draped in the usual sterile fashion  A time-out to identify the patient and all appropriate preoperative measures was performed  We began with harvest of the abdominal free flap  The designed flap was cut on the skin using sharp dissection  The dissection plane was then carried down to the abdominal wall using electrocautery  The upper flap in the abdomen was elevated off of the abdominal wall fascia up to the level of the costal margin and xiphoid using electrocautery and hemoclips to control any perforating vessels  The lower incisional flap was then incised, and a 5 cm segment of the SIEV was dissected and clipped distally  No suitable SIEA was identified       We then turned our attention toward elevation of the flap  This was harvested in lateral to medial fashion elevating it off of the anterior abdominal wall fascia  Given the anatomy of the perforators, we elected to harvest a muscle sparing TRAM, preserving lateral row and muscle base on right hemiabdomen  We first incised the fascia around our selected perforators using sharp dissection  Upon completion of this, we then turned our attention disssection of the selected perforators through the rectus muscle  Medial rectus muscle segment was harvested in continuity with selected perforators  Once this was completed we then turned our attention toward the dissection of the deep inferior epigastric vessels  These were carefully dissected down to the origin of the external iliac vessels, taking care to control any branches with hemoclips  Upon completion of this, we turned our attention toward the exposure of the internal mammary vessels  The prior mastectomy incision was incised and pectoralis major muscle overlying the third rib was split and then divided superiorly along the medial edge up to the lower border of the adjacent proximal rib  The perichondrium was then incised with electrocautery and carefully elevated off of the cartilage  The cartilaginous portion of the rib was then removed with a rongeur and remaining the posterior perichondrium and intercostal musculature were carefully dissected free revealing the internal mammary artery and its vena comitantes  Under the operating microscope, the vein and artery were then prepared for microvascular anastomosis by clipping the distal aspects and controlling the proximal vessels with Biover clamps  We then turned our attention toward harvest and anastomosis of the flap  The internal mammary vein was anastomosed to the flap vena comitantes end-to-end with a 2 5 mm flowcoupler  The artery was then anastomosed end-to-end with interrupted 9-0 nylon suture   Upon release of the clamps, the flap perfused well and showed no sign of venous insufficiency or arterial insufficiency  We then began the inset of the flap  Firs ICG angiography was performed to evaluate flap perfusion in zone 4 prior to removing excess flap  5ml of ICG was infused and flushed with 10 ml of normal saline  Great perfusion in flap and zon3 4 poorly perfused tissue was marked and excessive full thickness and discarded  The inferior prior mastectomy skin was incised to the IMF and lateral border of the breast  This was passed off as a permanent specimen  The flap was then shaped to a size and shape with good symmetry to the contralateral side  One 15 [place]e drains were placed in the breast pocket  We then turned our attention to the closure of the abdomen  A piece of bioabsorbable Phasix mesh was selected and soaked in antibiotic irrigation containing Ancef, Gentamycin and Bacitracin  It was then inset into the fascial defect with buried horizontal mattress 1 PDS sutures  The fascia was then closed over the mesh with buried interrupted figure-of-eight 1 PDS sutures  The abdomen was then copiously irrigated with normal saline, and meticulous hemostasis was verified  Two 15- Pashto drains were then placed in the abdomen and brought out through the lateral lower abdomen  The abdomen was then closed in layers with interrupted 2-0 PDS for scarpas layer , Insorb stapler interrupted  in the deep dermis, and a running 3-0 Strattafix  The umbilicus was then brought out through a new incision in the abdomen and secured to the abdominal flap using interrupted 3-0 and 4-0 Monocryl sutures  A strong Doppler signal was then identified on the flap and marked with a 6-0 Prolene suture  Histacryl was applied to all incisions and a piece of xeroform was placed in the breast suture line  Negative pressure dressing was applied to the abdomen to decrease wound related complications and the patient was placed in a surgical bra       At the completion of the operation, all counts were reported as correct  As the attending physician, I attest that I was present for the entire procedure and performed all critical aspects of it  From my standpoint, the patient tolerated the procedure well and will transfer to the plastic surgery floor for postoperative monitoring and recovery  Vioptix and flow  with stable signal at conclusion of the case        I was present for the entire procedure and A physician assistant was required during the procedure for retraction tissue handling,dissection and suturing    Patient Disposition:  PACU  and hemodynamically stable    SIGNATURE: Raphael Caldera MD  DATE: September 5, 2019  TIME: 4:44 PM

## 2019-09-05 NOTE — ANESTHESIA POSTPROCEDURE EVALUATION
Post-Op Assessment Note    CV Status:  Stable  Pain Score: 0    Pain management: adequate     Mental Status:  Alert   Hydration Status:  Stable   PONV Controlled:  Controlled   Airway Patency:  Patent   Post Op Vitals Reviewed: Yes      Staff: CRNA           BP      Temp      Pulse     Resp      SpO2

## 2019-09-05 NOTE — BRIEF OP NOTE (RAD/CATH)
BREAST RECONSTRUCTION W/FLAP FREE DEEP INFERIOR EPIGASTRIC  (MARU)/ MESH PLACEMENT  Procedure Note    Liya Richardson  9/5/2019    Pre-op Diagnosis:   Malignant neoplasm of overlapping sites of left breast in female, estrogen receptor positive ([facility] ) [C50 812, Z17 0]       Post-Op Diagnosis Codes:     * Malignant neoplasm of overlapping sites of left breast in female, estrogen receptor positive ([facility] [address] ) [C50 812, Z17 0]    Procedure(s):  BREAST RECONSTRUCTION W/FLAP FREE DEEP INFERIOR EPIGASTRIC  (MARU)/ MESH PLACEMENT    Surgeon(s) and Role:     * Zaria Koo MD - Primary     * Estrella Jones PA-C - Assisting     * Moshe Meyer MD - Assisting     Anesthesia: General    Staff:   Circulator: Mariam Page RN; Kalyani Zee RN  Relief Scrub: Nicholas Gardner RN  Scrub Person: Mimi Flores  No qualified Resident was available, Resident is only observing    Estimated Blood Loss: 200 mL    Specimens:                Order Name Source Comment Collection Info Order Time   TISSUE EXAM Breast, Left  Collected By: Zaria Koo MD 9/5/2019  3:11 PM       Drains:   Closed/Suction Drain Right Abdomen Bulb 15 Fr  (Active)       Closed/Suction Drain Left Abdomen Bulb 15 Fr  (Active)       Closed/Suction Drain Left Breast Bulb 15 Fr  (Active)       NG/OG/Enteral Tube Orogastric 16 Fr Center mouth (Active)       Urethral Catheter Temperature probe 16 Fr   (Active)       Findings:  See dictation    Complications:  none      Moshe Meyer MD     Date: 9/5/2019  Time: 4:36 PM

## 2019-09-05 NOTE — PROGRESS NOTES
Flap check- flap warm  Color normal  Turgor normal  Cap refill normal  Ultrasound signal present  Flap tissue oximeter 60%, signal quality 98%

## 2019-09-06 LAB
ANION GAP SERPL CALCULATED.3IONS-SCNC: 10 MMOL/L (ref 4–13)
BUN SERPL-MCNC: 17 MG/DL (ref 5–25)
CALCIUM SERPL-MCNC: 8.4 MG/DL (ref 8.3–10.1)
CHLORIDE SERPL-SCNC: 99 MMOL/L (ref 100–108)
CO2 SERPL-SCNC: 28 MMOL/L (ref 21–32)
CREAT SERPL-MCNC: 0.83 MG/DL (ref 0.6–1.3)
ERYTHROCYTE [DISTWIDTH] IN BLOOD BY AUTOMATED COUNT: 13.2 % (ref 11.6–15.1)
GFR SERPL CREATININE-BSD FRML MDRD: 81 ML/MIN/1.73SQ M
GLUCOSE SERPL-MCNC: 156 MG/DL (ref 65–140)
HCT VFR BLD AUTO: 26.2 % (ref 34.8–46.1)
HGB BLD-MCNC: 9 G/DL (ref 11.5–15.4)
MAGNESIUM SERPL-MCNC: 1.4 MG/DL (ref 1.6–2.6)
MCH RBC QN AUTO: 29.1 PG (ref 26.8–34.3)
MCHC RBC AUTO-ENTMCNC: 34.4 G/DL (ref 31.4–37.4)
MCV RBC AUTO: 85 FL (ref 82–98)
PLATELET # BLD AUTO: 202 THOUSANDS/UL (ref 149–390)
PMV BLD AUTO: 10.5 FL (ref 8.9–12.7)
POTASSIUM SERPL-SCNC: 3.3 MMOL/L (ref 3.5–5.3)
RBC # BLD AUTO: 3.09 MILLION/UL (ref 3.81–5.12)
SODIUM SERPL-SCNC: 137 MMOL/L (ref 136–145)
WBC # BLD AUTO: 8.99 THOUSAND/UL (ref 4.31–10.16)

## 2019-09-06 PROCEDURE — 83735 ASSAY OF MAGNESIUM: CPT | Performed by: PLASTIC SURGERY

## 2019-09-06 PROCEDURE — 80048 BASIC METABOLIC PNL TOTAL CA: CPT | Performed by: PLASTIC SURGERY

## 2019-09-06 PROCEDURE — 99024 POSTOP FOLLOW-UP VISIT: CPT | Performed by: PLASTIC SURGERY

## 2019-09-06 PROCEDURE — 85027 COMPLETE CBC AUTOMATED: CPT | Performed by: PLASTIC SURGERY

## 2019-09-06 RX ORDER — MAGNESIUM SULFATE HEPTAHYDRATE 40 MG/ML
4 INJECTION, SOLUTION INTRAVENOUS ONCE
Status: COMPLETED | OUTPATIENT
Start: 2019-09-06 | End: 2019-09-06

## 2019-09-06 RX ORDER — POTASSIUM CHLORIDE 20 MEQ/1
40 TABLET, EXTENDED RELEASE ORAL ONCE
Status: COMPLETED | OUTPATIENT
Start: 2019-09-06 | End: 2019-09-06

## 2019-09-06 RX ADMIN — LISINOPRIL: 20 TABLET ORAL at 08:24

## 2019-09-06 RX ADMIN — IBUPROFEN 800 MG: 400 TABLET ORAL at 05:14

## 2019-09-06 RX ADMIN — METOPROLOL TARTRATE 50 MG: 50 TABLET, FILM COATED ORAL at 08:24

## 2019-09-06 RX ADMIN — SODIUM CHLORIDE, SODIUM LACTATE, POTASSIUM CHLORIDE, AND CALCIUM CHLORIDE 75 ML/HR: .6; .31; .03; .02 INJECTION, SOLUTION INTRAVENOUS at 13:00

## 2019-09-06 RX ADMIN — POTASSIUM CHLORIDE 40 MEQ: 1500 TABLET, EXTENDED RELEASE ORAL at 06:31

## 2019-09-06 RX ADMIN — IBUPROFEN 800 MG: 400 TABLET ORAL at 22:00

## 2019-09-06 RX ADMIN — AMLODIPINE BESYLATE 5 MG: 5 TABLET ORAL at 08:24

## 2019-09-06 RX ADMIN — MAGNESIUM SULFATE HEPTAHYDRATE 4 G: 40 INJECTION, SOLUTION INTRAVENOUS at 07:14

## 2019-09-06 RX ADMIN — GABAPENTIN 300 MG: 300 CAPSULE ORAL at 18:05

## 2019-09-06 RX ADMIN — ACETAMINOPHEN 975 MG: 325 TABLET ORAL at 01:16

## 2019-09-06 RX ADMIN — IBUPROFEN 800 MG: 400 TABLET ORAL at 13:46

## 2019-09-06 RX ADMIN — ASPIRIN 325 MG ORAL TABLET 325 MG: 325 PILL ORAL at 13:00

## 2019-09-06 RX ADMIN — CEFAZOLIN SODIUM 1000 MG: 1 SOLUTION INTRAVENOUS at 05:14

## 2019-09-06 RX ADMIN — ENOXAPARIN SODIUM 40 MG: 40 INJECTION SUBCUTANEOUS at 08:23

## 2019-09-06 RX ADMIN — GABAPENTIN 300 MG: 300 CAPSULE ORAL at 22:00

## 2019-09-06 NOTE — PLAN OF CARE
Problem: Prexisting or High Potential for Compromised Skin Integrity  Goal: Skin integrity is maintained or improved  Description  INTERVENTIONS:  - Identify patients at risk for skin breakdown  - Assess and monitor skin integrity  - Assess and monitor nutrition and hydration status  - Monitor labs   - Assess for incontinence   - Turn and reposition patient  - Assist with mobility/ambulation  - Relieve pressure over bony prominences  - Avoid friction and shearing  - Provide appropriate hygiene as needed including keeping skin clean and dry  - Evaluate need for skin moisturizer/barrier cream  - Collaborate with interdisciplinary team   - Patient/family teaching  - Consider wound care consult   Outcome: Progressing     Problem: GASTROINTESTINAL - ADULT  Goal: Minimal or absence of nausea and/or vomiting  Description  INTERVENTIONS:  - Administer IV fluids if ordered to ensure adequate hydration  - Maintain NPO status until nausea and vomiting are resolved  - Nasogastric tube if ordered  - Administer ordered antiemetic medications as needed  - Provide nonpharmacologic comfort measures as appropriate  - Advance diet as tolerated, if ordered  - Consider nutrition services referral to assist patient with adequate nutrition and appropriate food choices  Outcome: Progressing  Goal: Maintains or returns to baseline bowel function  Description  INTERVENTIONS:  - Assess bowel function  - Encourage oral fluids to ensure adequate hydration  - Administer IV fluids if ordered to ensure adequate hydration  - Administer ordered medications as needed  - Encourage mobilization and activity  - Consider nutritional services referral to assist patient with adequate nutrition and appropriate food choices  Outcome: Progressing  Goal: Maintains adequate nutritional intake  Description  INTERVENTIONS:  - Monitor percentage of each meal consumed  - Identify factors contributing to decreased intake, treat as appropriate  - Assist with meals as needed  - Monitor I&O, weight, and lab values if indicated  - Obtain nutrition services referral as needed  Outcome: Progressing  Goal: Establish and maintain optimal ostomy function  Description  INTERVENTIONS:  - Assess bowel function  - Encourage oral fluids to ensure adequate hydration  - Administer IV fluids if ordered to ensure adequate hydration   - Administer ordered medications as needed  - Encourage mobilization and activity  - Nutrition services referral to assist patient with appropriate food choices  - Assess stoma site  - Consider wound care consult   Outcome: Progressing     Problem: GENITOURINARY - ADULT  Goal: Maintains or returns to baseline urinary function  Description  INTERVENTIONS:  - Assess urinary function  - Encourage oral fluids to ensure adequate hydration if ordered  - Administer IV fluids as ordered to ensure adequate hydration  - Administer ordered medications as needed  - Offer frequent toileting  - Follow urinary retention protocol if ordered  Outcome: Progressing  Goal: Absence of urinary retention  Description  INTERVENTIONS:  - Assess patient's ability to void and empty bladder  - Monitor I/O  - Bladder scan as needed  - Discuss with physician/AP medications to alleviate retention as needed  - Discuss catheterization for long term situations as appropriate   Outcome: Progressing  Goal: Urinary catheter remains patent  Description  INTERVENTIONS:  - Assess patency of urinary catheter  - If patient has a chronic funes, consider changing catheter if non-functioning  - Follow guidelines for intermittent irrigation of non-functioning urinary catheter  Outcome: Progressing     Problem: SKIN/TISSUE INTEGRITY - ADULT  Goal: Skin integrity remains intact  Description  INTERVENTIONS  - Identify patients at risk for skin breakdown  - Assess and monitor skin integrity  - Assess and monitor nutrition and hydration status  - Monitor labs (i e  albumin)  - Assess for incontinence   - Turn and reposition patient  - Assist with mobility/ambulation  - Relieve pressure over bony prominences  - Avoid friction and shearing  - Provide appropriate hygiene as needed including keeping skin clean and dry  - Evaluate need for skin moisturizer/barrier cream  - Collaborate with interdisciplinary team (i e  Nutrition, Rehabilitation, etc )   - Patient/family teaching  Outcome: Progressing  Goal: Incision(s), wounds(s) or drain site(s) healing without S/S of infection  Description  INTERVENTIONS  - Assess and document risk factors for skin impairment   - Assess and document dressing, incision, wound bed, drain sites and surrounding tissue  - Consider nutrition services referral as needed  - Oral mucous membranes remain intact  - Provide patient/ family education  Outcome: Progressing  Goal: Oral mucous membranes remain intact  Description  INTERVENTIONS  - Assess oral mucosa and hygiene practices  - Implement preventative oral hygiene regimen  - Implement oral medicated treatments as ordered  - Initiate Nutrition services referral as needed  Outcome: Progressing     Problem: Nutrition/Hydration-ADULT  Goal: Nutrient/Hydration intake appropriate for improving, restoring or maintaining nutritional needs  Description  Monitor and assess patient's nutrition/hydration status for malnutrition  Collaborate with interdisciplinary team and initiate plan and interventions as ordered  Monitor patient's weight and dietary intake as ordered or per policy  Utilize nutrition screening tool and intervene as necessary  Determine patient's food preferences and provide high-protein, high-caloric foods as appropriate       INTERVENTIONS:  - Monitor oral intake, urinary output, labs, and treatment plans  - Assess nutrition and hydration status and recommend course of action  - Evaluate amount of meals eaten  - Assist patient with eating if necessary   - Allow adequate time for meals  - Recommend/ encourage appropriate diets, oral nutritional supplements, and vitamin/mineral supplements  - Order, calculate, and assess calorie counts as needed  - Recommend, monitor, and adjust tube feedings and TPN/PPN based on assessed needs  - Assess need for intravenous fluids  - Provide specific nutrition/hydration education as appropriate  - Include patient/family/caregiver in decisions related to nutrition  Outcome: Progressing

## 2019-09-06 NOTE — NURSING NOTE
Left breast is warm and dry to touch  Incision is approximated with scant serosanguineous drainage    Flow  present  Tissue ox 66%  Signal quality 97%

## 2019-09-06 NOTE — PROGRESS NOTES
Progress Note - Plastic Surgery   Montine Severance  /  [id number]      Assessment:  Montine Severance 49 y o  female with history of left breast cancer sp mastectomy and chemo/xrt POD1 sp delay reconstruction with autologous free abdominal tissue  Plan:  Pain control  Advance diet as tolerated  DC funes and monitor first void  Flap check Q1h  Strict I/O, maintain and monitor drains output  Monitor labs and replete electrolytes  OOB   Continue vac dressing  DVT proph    Subjective:   Manuel Juniorargenisromi 49 y o  female with history of left breast cancer sp mastectomy and chemo/xrt POD1 sp delay reconstruction with autologous free abdominal tissue  Patient doing well without complaint  Pain well controlled  Patient tolerating po, denies nausea or vomiting  Objective:    Blood pressure 107/65, pulse 60, temperature 97 6 °F (36 4 °C), resp  rate 20, height 5' 7" (1 702 m), weight 103 kg (226 lb 13 7 oz), SpO2 95 %  ,Body mass index is 35 53 kg/(m^2)  Intake/Output Summary (Last 24 hours) at 07/11/16 1321  Last data filed at 07/11/16 1201   Gross per 24 hour   Intake    100 ml   Output   3400 ml   Net    -3300 ml         Invasive Devices     Peripherally Inserted Central Catheter Line            PICC Line 06/23/16 Brachial 17 days          Drain            Closed/Suction Drain Right; Inferior Chest Bulb 10 days    Closed/Suction Drain Right;Superior Abdomen Bulb 15 Fr  10 days                  Physical Exam:  Gen: NAD, AAO3  Pul: Unlabored  Abd: Soft, ND, NT, Vac dressing intact and functional with good seal  Breast: Flap warm and pink, with good cap refill  Strong doppler signal, no evidence of ischemia, hematoma or collection  Vein  with good signal  Viopix consistent   Drains 20/35/85 and serosanguinous    Lab, Imaging and other studies:  CBC:   Lab Results   Component Value Date    WBC 8 99 09/06/2019    HGB 9 0 (L) 09/06/2019    HCT 26 2 (L) 09/06/2019    MCV 85 09/06/2019     09/06/2019    MCH 29 1 09/06/2019    MCHC 34 4 09/06/2019    RDW 13 2 09/06/2019    MPV 10 5 09/06/2019   , CMP:   Lab Results   Component Value Date    SODIUM 137 09/06/2019    K 3 3 (L) 09/06/2019    CL 99 (L) 09/06/2019    CO2 28 09/06/2019    BUN 17 09/06/2019    CREATININE 0 83 09/06/2019    CALCIUM 8 4 09/06/2019    EGFR 81 09/06/2019

## 2019-09-06 NOTE — NURSING NOTE
Left breast is warm and dry  Incision is approximated with scant serosanguineous drainage  Positive Doppler  Flow  present  Tissue ox 71% Signal quality 80%   Will continue to monitor

## 2019-09-06 NOTE — NURSING NOTE
Left breast is warm and dry  Incision is approximated with scant serosanguineous drainage  Positive Doppler  Flow  present  Tissue ox 69% Signal quality 87%   Will continue to monitor

## 2019-09-06 NOTE — NURSING NOTE
Left breast is warm and dry to touch  Incision is approximated with scant serosanguineous drainage    Flow  present  Tissue ox 68%  Signal quality 91%

## 2019-09-06 NOTE — NUTRITION
09/06/19 1029   Recommendations/Interventions   Summary Pt s/p MARU, wound vac in place per chart review/discussion with RN  Interviewed pt with assistance of PostedIn  [id number]  Pt tolerating diet with advancement, denies changes in appetite  Agreeable to Prosource supplement due to increased protein needs for wound healing  RD currently does not have protocol to order supplements  Recommend to provide Prosource BID at breakfast and dinner meals  Will monitor acceptance/tolerance and provide additional recommendations as indicated

## 2019-09-06 NOTE — NURSING NOTE
Left breast is cool to touch  Incision is approximated with scant serosanguineous drainage    Flow  present  Tissue ox 66%  Signal quality 98%

## 2019-09-06 NOTE — NURSING NOTE
Left breast is warm and dry  Incision is approximated with scant serosanguineous drainage  Positive Doppler  Flow  present  Tissue ox 68% Signal quality 92%   Will continue to monitor

## 2019-09-06 NOTE — NURSING NOTE
Left breast is warm and dry  Incision is approximated with scant serosanguineous drainage  Positive Doppler  Flow  present  Tissue ox 67% Signal quality 92%   Will continue to monitor

## 2019-09-06 NOTE — NURSING NOTE
Left breast is warm and dry to touch  Incision is approximated with scant serosanguineous drainage    Flow  present  Tissue ox 68%  Signal quality 95%

## 2019-09-06 NOTE — NURSING NOTE
Left breast is warm and dry  Incision is approximated with scant serosanguineous drainage  Positive Doppler  Flow  present  Tissue ox 71% Signal quality 79%   Will continue to monitor

## 2019-09-06 NOTE — NURSING NOTE
Left breast is warm and dry  Incision is approximated with scant serosanguineous drainage  Positive Doppler  Flow  present  Tissue ox 71% Signal quality 87%   Will continue to monitor

## 2019-09-06 NOTE — NURSING NOTE
Left breast is warm and dry  Incision is approximated with scant serosanguineous drainage  Positive Doppler  Flow  present  Tissue ox 68% Signal quality 86%   Will continue to monitor

## 2019-09-06 NOTE — NURSING NOTE
Left breast is warm to touch  Incision is approximated with scant serosanguineous drainage    Flow  present  Tissue ox 64%  Signal quality 97%

## 2019-09-06 NOTE — NURSING NOTE
Left breast is warm and dry to touch  Incision is approximated with scant serosanguineous drainage    Flow  present  Tissue ox 66%  Signal quality 92%

## 2019-09-06 NOTE — NURSING NOTE
Left breast is warm and dry to touch  Incision is approximated with scant serosanguineous drainage    Flow  present  Tissue ox 64%  Signal quality 94%

## 2019-09-06 NOTE — NURSING NOTE
Left breast is warm and dry  Incision is approximated with scant serosanguineous drainage  Positive Doppler  Flow  present  Tissue ox 70% Signal quality 88%   Will continue to monitor

## 2019-09-06 NOTE — NURSING NOTE
Left breast is warm and dry to touch  Incision is approximated with scant serosanguineous drainage    Flow  present  Tissue ox 64%  Signal quality 98%

## 2019-09-06 NOTE — NURSING NOTE
Left breast is warm to touch  Incision is approximated with scant serosanguineous drainage    Flow  present  Tissue ox 64%  Signal quality 94%

## 2019-09-06 NOTE — NURSING NOTE
Left breast is warm and dry  Incision is approximated with scant serosanguineous drainage  Positive Doppler  Flow  present  Tissue ox 71% Signal quality 88%   Will continue to monitor

## 2019-09-06 NOTE — NURSING NOTE
Left breast is warm and dry to touch  Incision is approximated with scant serosanguineous drainage    Flow  present  Tissue ox 67%  Signal quality 95%

## 2019-09-06 NOTE — NURSING NOTE
Left breast is warm and dry  Incision is approximated with scant serosanguineous drainage  Positive Doppler  Flow  present  Tissue ox 71% Signal quality 85%   Will continue to monitor

## 2019-09-06 NOTE — NURSING NOTE
Left breast is warm and dry  Incision is approximated with scant serosanguineous drainage  Positive Doppler  Flow  present  Tissue ox 69% Signal quality 84%   Will continue to monitor

## 2019-09-06 NOTE — NURSING NOTE
Left breast is warm and dry  Incision is approximated with scant serosanguineous drainage  Positive Doppler  Flow  present  Tissue ox 70% Signal quality 84%   Will continue to monitor

## 2019-09-06 NOTE — NURSING NOTE
Left breast is warm and dry to touch  Incision is approximated with scant serosanguineous drainage    Flow  present  Tissue ox 68%  Signal quality 92% stated

## 2019-09-06 NOTE — UTILIZATION REVIEW
Initial Clinical Review    Elective Inpatient Surgical Procedure-C50 812, Z17 0, C50 812, Z17 0    Age/Sex: 49 y o  female     Surgery Date: 9/5/2019  Procedure: BREAST RECONSTRUCTION W/FLAPMUSCLE SPARING TRAM FLAP   MESH PLACEMENT (Left)  INTRAOPERATIVE ICG ANGIOGRAPHY TO EVALUATE FLAP PERFUSION   APPLICATION OF NEGATIVE PRESSURE DRESSING  Anesthesia: Anesthesia Type:   General    Operative Findings: Operative Findings:  Small mammary vein vessels  A strong Doppler signal was then identified on the flap and marked with a 6-0 Prolene suture   Histacryl was applied to all incisions and a piece of xeroform was placed in the breast suture line    Admission Orders: Date/Time/Statement: Inpatient Admission Orders (From admission, onward)     Ordered        09/05/19 1653  Inpatient Admission  Once                   Orders Placed This Encounter   Procedures    Inpatient Admission     Standing Status:   Standing     Number of Occurrences:   1     Order Specific Question:   Admitting Physician     Answer:   Guillermo Rose [[id number]]     Order Specific Question:   Level of Care     Answer:   Level 1 Stepdown [13]     Order Specific Question:   Estimated length of stay     Answer:   Inpatient Only Surgery     Vital Signs: /63   Pulse 79   Temp 99 3 °F (37 4 °C)   Resp 13   Ht 4' 11" (1 499 m)   Wt 59 1 kg (130 lb 4 7 oz)   SpO2 97%   BMI 26 32 kg/m²   Diet: regular diet  Mobility: ambulate patient; up in chair, activity as tolerated    DVT Prophylaxis: Lovenox 40mg SC & SCD  Medications/Pain Control:   Current Facility-Administered Medications:  acetaminophen 975 mg Oral Q8H   albuterol 2 puff Inhalation Q4H PRN   amLODIPine 5 mg Oral Daily   aspirin 325 mg Oral Daily   diphenhydrAMINE 25 mg Oral Q6H PRN   enoxaparin 40 mg Subcutaneous Daily   gabapentin 300 mg Oral TID   OR irrigation builder  Irrigation Once   HYDROmorphone 0 5 mg Intravenous Q4H PRN   HYDROmorphone 0 5 mg Intravenous Q3H PRN   ibuprofen 800 mg Oral Q8H [facility]   lactated ringers 75 mL/hr Intravenous Continuous   lisinopril-hydrochlorothiazide (PRINZIDE 20/12  5) combo dose  Oral Daily   metoprolol tartrate 50 mg Oral Daily   nitroglycerin 1 inch Topical Once   ondansetron 4 mg Intravenous Q6H PRN   oxyCODONE 5 mg Oral Q4H PRN   OR irrigation builder  Irrigation Once   zolpidem 5 mg Oral HS PRN   DC arterial line  DC funes  Flap check Q1hr for 24 hr start 9/5 1700  Flap check Q2hr for 24 hr start 9/6 1638  Flap check Q4hr on 9/7 1638  Flap checks evaluate color temperature, cap refill  Audible handheld doppler   Flap check routine 9/5  Vitals Q 15 min x4; Q30min x2; Q1hr x4; then Q4hr  Wound vac lo continuous until [address] [facility] Review Department  Phone: [phone]; Fax [phone]  [email or web address]  ATTENTION: Please call with any questions or concerns to [phone]  and carefully listen to the prompts so that you are directed to the right person  Send all requests for admission clinical reviews, approved or denied determinations and any other requests to fax [phone]   All voicemails are confidential

## 2019-09-07 LAB
ANION GAP SERPL CALCULATED.3IONS-SCNC: 4 MMOL/L (ref 4–13)
BUN SERPL-MCNC: 17 MG/DL (ref 5–25)
CALCIUM SERPL-MCNC: 8.7 MG/DL (ref 8.3–10.1)
CHLORIDE SERPL-SCNC: 103 MMOL/L (ref 100–108)
CO2 SERPL-SCNC: 31 MMOL/L (ref 21–32)
CREAT SERPL-MCNC: 0.85 MG/DL (ref 0.6–1.3)
ERYTHROCYTE [DISTWIDTH] IN BLOOD BY AUTOMATED COUNT: 13.7 % (ref 11.6–15.1)
GFR SERPL CREATININE-BSD FRML MDRD: 78 ML/MIN/1.73SQ M
GLUCOSE SERPL-MCNC: 117 MG/DL (ref 65–140)
HCT VFR BLD AUTO: 26.5 % (ref 34.8–46.1)
HGB BLD-MCNC: 8.6 G/DL (ref 11.5–15.4)
MAGNESIUM SERPL-MCNC: 2.6 MG/DL (ref 1.6–2.6)
MCH RBC QN AUTO: 29 PG (ref 26.8–34.3)
MCHC RBC AUTO-ENTMCNC: 32.5 G/DL (ref 31.4–37.4)
MCV RBC AUTO: 89 FL (ref 82–98)
PLATELET # BLD AUTO: 202 THOUSANDS/UL (ref 149–390)
PMV BLD AUTO: 10.5 FL (ref 8.9–12.7)
POTASSIUM SERPL-SCNC: 3.7 MMOL/L (ref 3.5–5.3)
RBC # BLD AUTO: 2.97 MILLION/UL (ref 3.81–5.12)
SODIUM SERPL-SCNC: 138 MMOL/L (ref 136–145)
WBC # BLD AUTO: 8.61 THOUSAND/UL (ref 4.31–10.16)

## 2019-09-07 PROCEDURE — 80048 BASIC METABOLIC PNL TOTAL CA: CPT | Performed by: PHYSICIAN ASSISTANT

## 2019-09-07 PROCEDURE — NS001 PR NO SIGNATURE OR ATTESTATION: Performed by: PLASTIC SURGERY

## 2019-09-07 PROCEDURE — 83735 ASSAY OF MAGNESIUM: CPT | Performed by: PHYSICIAN ASSISTANT

## 2019-09-07 PROCEDURE — 85027 COMPLETE CBC AUTOMATED: CPT | Performed by: PHYSICIAN ASSISTANT

## 2019-09-07 RX ADMIN — METOPROLOL TARTRATE 50 MG: 50 TABLET, FILM COATED ORAL at 08:55

## 2019-09-07 RX ADMIN — ACETAMINOPHEN 975 MG: 325 TABLET ORAL at 16:37

## 2019-09-07 RX ADMIN — IBUPROFEN 800 MG: 400 TABLET ORAL at 21:39

## 2019-09-07 RX ADMIN — ENOXAPARIN SODIUM 40 MG: 40 INJECTION SUBCUTANEOUS at 08:55

## 2019-09-07 RX ADMIN — GABAPENTIN 300 MG: 300 CAPSULE ORAL at 08:55

## 2019-09-07 RX ADMIN — SODIUM CHLORIDE, SODIUM LACTATE, POTASSIUM CHLORIDE, AND CALCIUM CHLORIDE 75 ML/HR: .6; .31; .03; .02 INJECTION, SOLUTION INTRAVENOUS at 16:37

## 2019-09-07 RX ADMIN — ASPIRIN 325 MG ORAL TABLET 325 MG: 325 PILL ORAL at 12:50

## 2019-09-07 RX ADMIN — ACETAMINOPHEN 975 MG: 325 TABLET ORAL at 08:55

## 2019-09-07 RX ADMIN — LISINOPRIL: 20 TABLET ORAL at 08:55

## 2019-09-07 RX ADMIN — GABAPENTIN 300 MG: 300 CAPSULE ORAL at 21:39

## 2019-09-07 RX ADMIN — AMLODIPINE BESYLATE 5 MG: 5 TABLET ORAL at 09:04

## 2019-09-07 RX ADMIN — SODIUM CHLORIDE, SODIUM LACTATE, POTASSIUM CHLORIDE, AND CALCIUM CHLORIDE 75 ML/HR: .6; .31; .03; .02 INJECTION, SOLUTION INTRAVENOUS at 01:55

## 2019-09-07 NOTE — NURSING NOTE
Left breast is warm and dry  Incision is approximated  Positive Doppler  Flow  present  Tissue ox 63% Signal quality 96%  Will continue to monitor

## 2019-09-07 NOTE — NURSING NOTE
Left breast is warm and dry  Incision is approximated and unchanged  Positive doppler  Flow  present  Tissue ox 67% and Signal Quality 94  Will continue to monitor

## 2019-09-07 NOTE — NURSING NOTE
Left breast is warm and dry  Incision is approximated and unchanged  Positive doppler  Flow  present  Tissue ox 65% and Signal Quality 97  Will continue to monitor

## 2019-09-07 NOTE — PLAN OF CARE
Problem: Prexisting or High Potential for Compromised Skin Integrity  Goal: Skin integrity is maintained or improved  Description  INTERVENTIONS:  - Identify patients at risk for skin breakdown  - Assess and monitor skin integrity  - Assess and monitor nutrition and hydration status  - Monitor labs   - Assess for incontinence   - Turn and reposition patient  - Assist with mobility/ambulation  - Relieve pressure over bony prominences  - Avoid friction and shearing  - Provide appropriate hygiene as needed including keeping skin clean and dry  - Evaluate need for skin moisturizer/barrier cream  - Collaborate with interdisciplinary team   - Patient/family teaching  - Consider wound care consult   9/7/2019 1150 by Rod Klien RN  Outcome: Progressing  9/7/2019 1149 by Rod Klein RN  Outcome: Progressing     Problem: GASTROINTESTINAL - ADULT  Goal: Minimal or absence of nausea and/or vomiting  Description  INTERVENTIONS:  - Administer IV fluids if ordered to ensure adequate hydration  - Maintain NPO status until nausea and vomiting are resolved  - Nasogastric tube if ordered  - Administer ordered antiemetic medications as needed  - Provide nonpharmacologic comfort measures as appropriate  - Advance diet as tolerated, if ordered  - Consider nutrition services referral to assist patient with adequate nutrition and appropriate food choices  9/7/2019 1150 by Rod Klein RN  Outcome: Progressing  9/7/2019 1149 by Rod Klein RN  Outcome: Progressing  Goal: Maintains or returns to baseline bowel function  Description  INTERVENTIONS:  - Assess bowel function  - Encourage oral fluids to ensure adequate hydration  - Administer IV fluids if ordered to ensure adequate hydration  - Administer ordered medications as needed  - Encourage mobilization and activity  - Consider nutritional services referral to assist patient with adequate nutrition and appropriate food choices  9/7/2019 1150 by Rod Klein RN  Outcome: Progressing  9/7/2019 1149 by Corbin Navarro RN  Outcome: Progressing  Goal: Maintains adequate nutritional intake  Description  INTERVENTIONS:  - Monitor percentage of each meal consumed  - Identify factors contributing to decreased intake, treat as appropriate  - Assist with meals as needed  - Monitor I&O, weight, and lab values if indicated  - Obtain nutrition services referral as needed  9/7/2019 1150 by Corbin Navarro RN  Outcome: Progressing  9/7/2019 1149 by Corbin Navarro RN  Outcome: Progressing     Problem: SKIN/TISSUE INTEGRITY - ADULT  Goal: Skin integrity remains intact  Description  INTERVENTIONS  - Identify patients at risk for skin breakdown  - Assess and monitor skin integrity  - Assess and monitor nutrition and hydration status  - Monitor labs (i e  albumin)  - Assess for incontinence   - Turn and reposition patient  - Assist with mobility/ambulation  - Relieve pressure over bony prominences  - Avoid friction and shearing  - Provide appropriate hygiene as needed including keeping skin clean and dry  - Evaluate need for skin moisturizer/barrier cream  - Collaborate with interdisciplinary team (i e  Nutrition, Rehabilitation, etc )   - Patient/family teaching  9/7/2019 1150 by Corbin Navarro RN  Outcome: Progressing  9/7/2019 1149 by Corbin Navarro RN  Outcome: Progressing  Goal: Incision(s), wounds(s) or drain site(s) healing without S/S of infection  Description  INTERVENTIONS  - Assess and document risk factors for skin impairment   - Assess and document dressing, incision, wound bed, drain sites and surrounding tissue  - Consider nutrition services referral as needed  - Oral mucous membranes remain intact  - Provide patient/ family education  9/7/2019 1150 by Corbin Navarro RN  Outcome: Progressing  9/7/2019 1149 by Corbin Navarro RN  Outcome: Progressing  Goal: Oral mucous membranes remain intact  Description  INTERVENTIONS  - Assess oral mucosa and hygiene practices  - Implement preventative oral hygiene regimen  - Implement oral medicated treatments as ordered  - Initiate Nutrition services referral as needed  9/7/2019 1150 by Sarita Quach RN  Outcome: Progressing  9/7/2019 1149 by Sarita Quach RN  Outcome: Progressing     Problem: Nutrition/Hydration-ADULT  Goal: Nutrient/Hydration intake appropriate for improving, restoring or maintaining nutritional needs  Description  Monitor and assess patient's nutrition/hydration status for malnutrition  Collaborate with interdisciplinary team and initiate plan and interventions as ordered  Monitor patient's weight and dietary intake as ordered or per policy  Utilize nutrition screening tool and intervene as necessary  Determine patient's food preferences and provide high-protein, high-caloric foods as appropriate       INTERVENTIONS:  - Monitor oral intake, urinary output, labs, and treatment plans  - Assess nutrition and hydration status and recommend course of action  - Evaluate amount of meals eaten  - Assist patient with eating if necessary   - Allow adequate time for meals  - Recommend/ encourage appropriate diets, oral nutritional supplements, and vitamin/mineral supplements  - Order, calculate, and assess calorie counts as needed  - Recommend, monitor, and adjust tube feedings and TPN/PPN based on assessed needs  - Assess need for intravenous fluids  - Provide specific nutrition/hydration education as appropriate  - Include patient/family/caregiver in decisions related to nutrition  9/7/2019 1150 by Sarita Quach RN  Outcome: Progressing  9/7/2019 1149 by Sarita Quach RN  Outcome: Progressing     Problem: GASTROINTESTINAL - ADULT  Goal: Establish and maintain optimal ostomy function  Description  INTERVENTIONS:  - Assess bowel function  - Encourage oral fluids to ensure adequate hydration  - Administer IV fluids if ordered to ensure adequate hydration   - Administer ordered medications as needed  - Encourage mobilization and activity  - Nutrition services referral to assist patient with appropriate food choices  - Assess stoma site  - Consider wound care consult   9/7/2019 1150 by Steven Bocanegra RN  Outcome: Completed  9/7/2019 1149 by Steven Bocanegra RN  Outcome: Progressing     Problem: GENITOURINARY - ADULT  Goal: Maintains or returns to baseline urinary function  Description  INTERVENTIONS:  - Assess urinary function  - Encourage oral fluids to ensure adequate hydration if ordered  - Administer IV fluids as ordered to ensure adequate hydration  - Administer ordered medications as needed  - Offer frequent toileting  - Follow urinary retention protocol if ordered  9/7/2019 1150 by Steven Bocanegra RN  Outcome: Completed  9/7/2019 1149 by Steven Bocanegra RN  Outcome: Progressing  Goal: Absence of urinary retention  Description  INTERVENTIONS:  - Assess patient's ability to void and empty bladder  - Monitor I/O  - Bladder scan as needed  - Discuss with physician/AP medications to alleviate retention as needed  - Discuss catheterization for long term situations as appropriate   9/7/2019 1150 by Steven Bocanegra RN  Outcome: Completed  9/7/2019 1149 by Steven Bocanegra RN  Outcome: Progressing  Goal: Urinary catheter remains patent  Description  INTERVENTIONS:  - Assess patency of urinary catheter  - If patient has a chronic funes, consider changing catheter if non-functioning  - Follow guidelines for intermittent irrigation of non-functioning urinary catheter  9/7/2019 1150 by Steven Bocanegra RN  Outcome: Completed  9/7/2019 1149 by Steven Bocanegra RN  Outcome: Progressing

## 2019-09-07 NOTE — NURSING NOTE
Left breast is warm and dry  Incision is approximated and unchanged  Positive doppler  Flow  present  Tissue ox 67% and Signal Quality 92  Will continue to monitor

## 2019-09-07 NOTE — PROGRESS NOTES
Progress Note - Plastic Surgery   Ortega Moses  /  [id number]      Assessment:  Ortega Moses 49 y o  female with history of left breast cancer sp mastectomy and chemo/xrt POD2 sp delay reconstruction with autologous free abdominal tissue  Plan:  Pain control  Advance diet as tolerated  Flap check Q2h  Strict I/O, maintain and monitor drains output  Monitor labs and replete electrolytes  OOB   Continue vac dressing  DVT proph    Subjective:   Marya Delarosa 49 y o  female with history of left breast cancer sp mastectomy and chemo/xrt POD2 sp delay reconstruction with autologous free abdominal tissue  Patient doing well without complaint  Pain well controlled  Patient tolerating po, denies nausea or vomiting  Objective:    Blood pressure 107/65, pulse 60, temperature 97 6 °F (36 4 °C), resp  rate 20, height 5' 7" (1 702 m), weight 103 kg (226 lb 13 7 oz), SpO2 95 %  ,Body mass index is 35 53 kg/(m^2)  Intake/Output Summary (Last 24 hours) at 07/11/16 1321  Last data filed at 07/11/16 1201   Gross per 24 hour   Intake    100 ml   Output   3400 ml   Net    -3300 ml       Invasive Devices     Peripherally Inserted Central Catheter Line            PICC Line 06/23/16 Brachial 17 days          Drain            Closed/Suction Drain Right; Inferior Chest Bulb 10 days    Closed/Suction Drain Right;Superior Abdomen Bulb 15 Fr  10 days              Physical Exam:  Gen: NAD, AAO3  Pul: Unlabored  Abd: Soft, ND, NT, Vac dressing intact and functional with good seal  Breast: Flap warm and pink, with good cap refill  Strong doppler signal, no evidence of ischemia, hematoma or collection  Vein  with good signal  Viopix consistent   Drains 40/38/82/0 and serosanguinous    Lab, Imaging and other studies:  CBC:   Lab Results   Component Value Date    WBC 8 61 09/07/2019    HGB 8 6 (L) 09/07/2019    HCT 26 5 (L) 09/07/2019    MCV 89 09/07/2019     09/07/2019    MCH 29 0 09/07/2019    MCHC 32 5 09/07/2019    RDW 13 7 09/07/2019    MPV 10 5 09/07/2019   , CMP:   Lab Results   Component Value Date    SODIUM 138 09/07/2019    K 3 7 09/07/2019     09/07/2019    CO2 31 09/07/2019    BUN 17 09/07/2019    CREATININE 0 85 09/07/2019    CALCIUM 8 7 09/07/2019    EGFR 78 09/07/2019

## 2019-09-07 NOTE — NURSING NOTE
Left breast is warm and dry  Incision is approximated with scant serosanguineous drainage  Positive Doppler  Flow  present  Tissue ox 70% Signal quality 91%  Will continue to monitor

## 2019-09-07 NOTE — NURSING NOTE
Left breast is warm and dry  Incision is approximated and unchanged  Positive doppler  Flow  present  Tissue ox 68%  Signal Quality 99  Will continue to monitor

## 2019-09-07 NOTE — NURSING NOTE
Left breast is warm and dry  Incision is approximated  Positive Doppler  Flow  present  Tissue ox 65% Signal quality 94%  Will continue to monitor

## 2019-09-07 NOTE — NURSING NOTE
Left breast is warm and dry  Incision is approximated and unchanged  Positive doppler  Flow  present  Tissue ox 70% and Signal Quality 92  Will continue to monitor

## 2019-09-07 NOTE — NURSING NOTE
Left breast is warm and dry  Incision is approximated and unchanged  Positive doppler  Flow  present  Tissue ox 68% and Signal Quality 93  Will continue to monitor

## 2019-09-07 NOTE — NURSING NOTE
Left breast is warm and dry  Incision is approximated and unchanged  Positive doppler  Flow  present  Tissue ox 68% and Signal Quality 92  Will continue to monitor

## 2019-09-07 NOTE — NURSING NOTE
Left breast is warm and dry  Incision is approximated  Positive Doppler  Flow  present  Tissue ox 69% Signal quality 89%  Will continue to monitor

## 2019-09-07 NOTE — NURSING NOTE
Left breast is warm and dry  Incision is approximated and unchanged  Positive doppler  Flow  present  Tissue ox 68% and Signal Quality 96  Will continue to monitor

## 2019-09-07 NOTE — NURSING NOTE
Left breast is warm and dry  Incision is approximated  Positive Doppler  Flow  present  Tissue ox 69% Signal quality 94%  Will continue to monitor

## 2019-09-08 VITALS
OXYGEN SATURATION: 96 % | DIASTOLIC BLOOD PRESSURE: 67 MMHG | WEIGHT: 130.29 LBS | BODY MASS INDEX: 26.27 KG/M2 | TEMPERATURE: 98.3 F | HEART RATE: 75 BPM | RESPIRATION RATE: 18 BRPM | HEIGHT: 59 IN | SYSTOLIC BLOOD PRESSURE: 123 MMHG

## 2019-09-08 PROCEDURE — NC001 PR NO CHARGE: Performed by: PLASTIC SURGERY

## 2019-09-08 PROCEDURE — 99024 POSTOP FOLLOW-UP VISIT: CPT | Performed by: PLASTIC SURGERY

## 2019-09-08 RX ADMIN — SODIUM CHLORIDE, SODIUM LACTATE, POTASSIUM CHLORIDE, AND CALCIUM CHLORIDE 75 ML/HR: .6; .31; .03; .02 INJECTION, SOLUTION INTRAVENOUS at 06:48

## 2019-09-08 RX ADMIN — METOPROLOL TARTRATE 50 MG: 50 TABLET, FILM COATED ORAL at 08:32

## 2019-09-08 RX ADMIN — LISINOPRIL: 20 TABLET ORAL at 08:32

## 2019-09-08 RX ADMIN — ACETAMINOPHEN 975 MG: 325 TABLET ORAL at 10:34

## 2019-09-08 RX ADMIN — ENOXAPARIN SODIUM 40 MG: 40 INJECTION SUBCUTANEOUS at 08:31

## 2019-09-08 RX ADMIN — GABAPENTIN 300 MG: 300 CAPSULE ORAL at 10:34

## 2019-09-08 RX ADMIN — AMLODIPINE BESYLATE 5 MG: 5 TABLET ORAL at 08:32

## 2019-09-08 NOTE — DISCHARGE SUMMARY
Discharge Summary - Malathi Asencio 49 y o  female MRN: [id number]    Unit/Bed#:  Encounter: [id number]    Admission Date:   Admission Orders (From admission, onward)     Ordered        09/05/19 1653  Inpatient Admission  Once                     Admitting Diagnosis: Malignant neoplasm of overlapping sites of left breast in female, estrogen receptor positive ([facility] [address] ) [C50 812, Z17 0]    HPI:  Malathi Asencio is a 49y o  year old female who presents with history of left breast cancer who presents for breast reconstruction evaluation  Patient  Was found to have a local aggressive left breast invasive cancer with mucinous differentiation for which she underwent neoadjuvant chemotherapy and eventual modified radical mastectomy with Dr Sal Wilson and closure of left breast with local flap with Dr Isaiah Mao on Oct 15th 2019  She completed radiation therapy on 2/1/2017 and last surveillance with no evidence of recurrence         Procedures Performed: No orders of the defined types were placed in this encounter  Summary of Hospital Course:   Malathi Asencio 49 y o  female with history of left breast cancer sp mastectomy and chemo/xrt  sp delay reconstruction with autologous free abdominal tissue  Patient progressed appropriately and was deemed ready for discharge on POD3    Significant Findings, Care, Treatment and Services Provided:  Flap checks and monitoring    Complications: none    Discharge Diagnosis: Malathi Asencio 49 y o  female with history of left breast cancer sp mastectomy and chemo/xrt  sp delay reconstruction with autologous free abdominal tissue  Patient progressed appropriately and was deemed ready for discharge on POD3    Resolved Problems  Date Reviewed: 9/8/2019    None          Condition at Discharge: stable         Discharge instructions/Information to patient and family:   See after visit summary for information provided to patient and family        Provisions for Follow-Up Pt weigh in before surgery    tanita scale: 232.8lb wt           47bmi           45.6% fat           106.2lb fat mass           126.6lb ffm           91lb tbw    Pt successfully return demonstrated use of incentive spirometer.   Care:  See after visit summary for information related to follow-up care and any pertinent home health orders  PCP: Sherrill Mack MD    Disposition: Home    Planned Readmission: No    Discharge Medications:  See after visit summary for reconciled discharge medications provided to patient and family

## 2019-09-08 NOTE — PROGRESS NOTES
Progress Note - Plastic Surgery   Skip Hodgson 49 y o  female MRN: [id number]  Unit/Bed#:  Encounter: [id number]    Assessment/Plan:  49 y o  female with L breast Ca, hx mastectomy and chemo/rad, now s/p   BREAST RECONSTRUCTION W/FLAP FREE DEEP INFERIOR EPIGASTRIC  (MARU)/ MESH PLACEMENT 3 Days Post-Op  - 0 from VAC  - 32 cc serous L breast PEACE  - 55 cc serosang L thigh PEACE  - 50 cc serosang R thigh PEACE    Diet as tolerated  Continue flap checks  Continue drains x3  OOB  Analgesia      Subjective/Objective     Subjective: Patient denies complaints  Tolerating diet without difficulty      Objective:     Vitals: Temp:  [98 °F (36 7 °C)-98 6 °F (37 °C)] 98 3 °F (36 8 °C)  HR:  [75-86] 75  Resp:  [14-18] 18  BP: (121-135)/(56-81) 123/67  Body mass index is 26 32 kg/m²  I/O       09/06 0701 - 09/07 0700 09/07 0701 - 09/08 0700 09/08 0701 - 09/09 0700    P  O   320     I V  (mL/kg) 2536 3 (42 9) 1786 3 (30 2)     IV Piggyback       Total Intake(mL/kg) 2536 3 (42 9) 2106 3 (35 6)     Urine (mL/kg/hr) 940 (0 7) 475 (0 3)     Drains 160 137     Blood       Total Output 1100 612     Net +1436 3 +1494 3            Unmeasured Urine Occurrence 1 x 2 x           Physical Exam:  GEN: NAD  HEENT: MMM  CV: RRR  Chest: L breast flap well-perfused, cool to touch, doppler signal present  Lung: Normal effort  Ab: Soft, NT/ND  Extrem: No CCE  Neuro:  A+Ox3    Lab, Imaging and other studies: CBC with diff: No results found for: WBC, HGB, HCT, MCV, PLT, ADJUSTEDWBC, MCH, MCHC, RDW, MPV, NRBC, BMP/CMP: No results found for: SODIUM, K, CL, CO2, ANIONGAP, BUN, CREATININE, GLUCOSE, CALCIUM, AST, ALT, ALKPHOS, PROT, BILITOT, EGFR, Magnesium: No components found for: MAG  VTE Pharmacologic Prophylaxis: Enoxaparin (Lovenox)  VTE Mechanical Prophylaxis: sequential compression device

## 2019-09-08 NOTE — DISCHARGE INSTRUCTIONS
[facility]  Dr Nadene Curling [address], [address]  Phone: [phone]     Postoperative Instructions for Outpatient Surgery     These instructions are being provided by your doctor to give you basic guidelines during your post-op recovery  Please let our office know if your contact information has changed       Please call office to confirm a postoperative follow up appointment      Dressings: Keep dressings clean and dry                           Activity Restrictions: None     Bathing:  May sponge bath     Medications:    Resume pre-op medications     You may take tylenol, aleve, or ibuprofen for pain control               Other pain medications as needed    Other: Maintain and record drains outputs daily

## 2019-09-08 NOTE — PROGRESS NOTES
Progress Note - Plastic Surgery   Dina Billingsley  /  [id number]      Assessment:  Dina Billingsley 49 y o  female with history of left breast cancer sp mastectomy and chemo/xrt POD2 sp delay reconstruction with autologous free abdominal tissue  Plan:  Pain control  Diet as tolerated  Flap check Q4h  Strict I/O, maintain and monitor drains output  Drain teaching  DC today    Subjective:   Dina Billingsley 49 y o  female with history of left breast cancer sp mastectomy and chemo/xrt POD3 sp delay reconstruction with autologous free abdominal tissue  Patient doing well without complaint  Vac changed to home vac and venous  disconnected  Objective:    Blood pressure 107/65, pulse 60, temperature 97 6 °F (36 4 °C), resp  rate 20, height 5' 7" (1 702 m), weight 103 kg (226 lb 13 7 oz), SpO2 95 %  ,Body mass index is 35 53 kg/(m^2)  Intake/Output Summary (Last 24 hours) at 07/11/16 1321  Last data filed at 07/11/16 1201   Gross per 24 hour   Intake    100 ml   Output   3400 ml   Net    -3300 ml       Invasive Devices     Peripherally Inserted Central Catheter Line            PICC Line 06/23/16 Brachial 17 days          Drain            Closed/Suction Drain Right; Inferior Chest Bulb 10 days    Closed/Suction Drain Right;Superior Abdomen Bulb 15 Fr  10 days              Physical Exam:  Gen: NAD, AAO3  Pul: Unlabored  Abd: Soft, ND, NT, Vac dressing intact and functional with good seal  Breast: Flap warm and pink, with good cap refill  Strong doppler signal, no evidence of ischemia, hematoma or collection  Vein  with good signal  Viopix consistent   Drains 32/55/50 and serosanguinous

## 2019-09-08 NOTE — NURSING NOTE
Left breast is warm and dry  Incision is approximated and unchanged, without drainage  Positive doppler  Flow  present  Tissue ox 67% and Signal Quality 94  Will continue to monitor

## 2019-09-08 NOTE — NURSING NOTE
Left breast is warm and dry  Incision is approximated and unchanged  Positive doppler  Flow  present  Tissue ox 69% and Signal Quality 92  Will continue to monitor

## 2019-09-08 NOTE — NURSING NOTE
AVS reviewed with patient and daughter in law and understanding verbalized form both  Drain education given with understanding verbalized  No prescriptions written    Pt D/C via W/C with daughter & daughter in law present

## 2019-09-11 ENCOUNTER — TELEPHONE (OUTPATIENT)
Dept: PLASTIC SURGERY | Facility: CLINIC | Age: 54
End: 2019-09-11

## 2019-09-11 ENCOUNTER — OFFICE VISIT (OUTPATIENT)
Dept: PLASTIC SURGERY | Facility: CLINIC | Age: 54
End: 2019-09-11

## 2019-09-11 VITALS — HEIGHT: 59 IN | BODY MASS INDEX: 25.2 KG/M2 | WEIGHT: 125 LBS

## 2019-09-11 DIAGNOSIS — C50.812 MALIGNANT NEOPLASM OF OVERLAPPING SITES OF LEFT FEMALE BREAST, UNSPECIFIED ESTROGEN RECEPTOR STATUS (HCC): Primary | ICD-10-CM

## 2019-09-11 DIAGNOSIS — Z98.890 POST-OPERATIVE STATE: Primary | ICD-10-CM

## 2019-09-11 PROCEDURE — 99024 POSTOP FOLLOW-UP VISIT: CPT | Performed by: PLASTIC SURGERY

## 2019-09-11 RX ORDER — SULFAMETHOXAZOLE AND TRIMETHOPRIM 800; 160 MG/1; MG/1
1 TABLET ORAL EVERY 12 HOURS SCHEDULED
Qty: 20 TABLET | Refills: 0 | Status: SHIPPED | OUTPATIENT
Start: 2019-09-11 | End: 2019-09-21

## 2019-09-11 NOTE — PROGRESS NOTES
Catarina Demarco id a 49 y o  Female status post   BREAST RECONSTRUCTION W/FLAPMUSCLE SPARING TRAM FLAP   MESH PLACEMENT (Left)  INTRAOPERATIVE ICG ANGIOGRAPHY TO EVALUATE FLAP PERFUSION   APPLICATION OF NEGATIVE PRESSURE DRESSING  Patient in the office for first post operative visit  Drain output is in the 30ml range  Drains will stay in place  The VAC  Was removed  Abdominal incision dry and intact, steri strips place  umbilicus dressing taken down and bacitracin place  Left breast incision is dry and intact  drain sites covered with a folded 4x4  ABD pads were placed to protect the incision lines of the breast and abdomen  Surgical bra and abdominal binder placed back on patient  The patient complains of some urinary burning and blood  Dr Estuardo Dos Santos was made aware and the patient will be placed on Bactrim for 7 days  The patient will follow up next week for drain check  Language line  [id number] was used for visit

## 2019-09-16 RX ORDER — SULFAMETHOXAZOLE AND TRIMETHOPRIM 800; 160 MG/1; MG/1
1 TABLET ORAL EVERY 12 HOURS SCHEDULED
Qty: 14 TABLET | Refills: 0 | Status: SHIPPED | OUTPATIENT
Start: 2019-09-16 | End: 2019-09-23

## 2019-09-18 ENCOUNTER — OFFICE VISIT (OUTPATIENT)
Dept: PLASTIC SURGERY | Facility: CLINIC | Age: 54
End: 2019-09-18

## 2019-09-18 DIAGNOSIS — Z98.890 POST-OPERATIVE STATE: Primary | ICD-10-CM

## 2019-09-18 PROCEDURE — 99024 POSTOP FOLLOW-UP VISIT: CPT | Performed by: PLASTIC SURGERY

## 2019-09-18 NOTE — PROGRESS NOTES
Mirella Elkins is a 49 y o  Female status post BREAST RECONSTRUCTION W/FLAPMUSCLE SPARING TRAM FLAP   MESH PLACEMENT (Left)  INTRAOPERATIVE ICG ANGIOGRAPHY TO EVALUATE FLAP PERFUSION   APPLICATION OF NEGATIVE PRESSURE DRESSING  Patient in the office today for drain check, all drains left breast and bilateral hips have minimal output  All drains removed  The umbilicus has some eschar tissue, instructed the patient to wash with mild sopa and water daily and to pat dry, apply calcium alginate and cover with gauze  Patient will follow up in 2 weeks with the PA

## 2019-10-04 ENCOUNTER — OFFICE VISIT (OUTPATIENT)
Dept: PLASTIC SURGERY | Facility: CLINIC | Age: 54
End: 2019-10-04

## 2019-10-04 VITALS — HEIGHT: 59 IN | WEIGHT: 125 LBS | BODY MASS INDEX: 25.2 KG/M2

## 2019-10-04 DIAGNOSIS — Z98.890 STATUS POST TRANSVERSE RECTUS ABDOMINIS MUSCLE (TRAM) FLAP BREAST RECONSTRUCTION: Primary | ICD-10-CM

## 2019-10-04 PROCEDURE — 99024 POSTOP FOLLOW-UP VISIT: CPT | Performed by: PHYSICIAN ASSISTANT

## 2019-10-04 RX ORDER — LISINOPRIL 40 MG/1
40 TABLET ORAL DAILY
Refills: 0 | COMMUNITY
Start: 2019-07-10 | End: 2020-03-16

## 2019-10-04 RX ORDER — AMOXICILLIN 250 MG
2 CAPSULE ORAL DAILY
COMMUNITY
Start: 2019-07-10 | End: 2021-01-19

## 2019-10-04 RX ORDER — BUPROPION HYDROCHLORIDE 150 MG/1
150 TABLET ORAL EVERY MORNING
COMMUNITY

## 2019-10-04 RX ORDER — METOPROLOL SUCCINATE 50 MG/1
TABLET, EXTENDED RELEASE ORAL
COMMUNITY
Start: 2019-07-10

## 2019-10-04 RX ORDER — MECLIZINE HYDROCHLORIDE 25 MG/1
TABLET ORAL
COMMUNITY
Start: 2018-10-10

## 2019-10-04 NOTE — PROGRESS NOTES
09/05/19 0755         Procedure: BREAST RECONSTRUCTION W/FLAP FREE DEEP INFERIOR EPIGASTRIC  (MARU)/ MESH PLACEMENT (Left Back)   Anesthesia type: general     Breast incision is healing well  Umbilicus has some fib exudate and some serous drainage  2 Insorb clips were removed from the wound  She discussed her progress with Dr Mady Smith, who feels she is doing well  He would like to see her in 1 5 months  Bacitracin is to be applied to the umbilicus and abdominal incision  She was given information on silicone scar well

## 2019-10-18 NOTE — DISCHARGE INSTRUCTIONS
Dolor abdominal, cuidados ambulatorios   INFORMACIÓN GENERAL:   El dolor abdominal  puede ser sordo, molesto o Horomerice  Puede sentir dolor en cortney shaina del abdomen o en todo el abdomen  El dolor puede deberse a ciertos estados ashley estreñimiento, sensibilidad o intoxicación alimentaria, infección o cortney obstrucción  Asimismo, el dolor abdominal puede deberse a cortney hernia, apendicitis o úlcera  La causa del dolor abdominal puede ser desconocida  Busque cuidados inmediatos para los siguientes síntomas:   · Whippany dolor de pecho o falta de aliento    · Dolor pulsátil en el abdomen superior o en la parte inferior de la espalda que de repente es maurice    · Dolor que se localiza en el abdomen inferior derecho y empeora con el movimiento    · Fiebre por encima de 100 4°F (38°C) o escalofríos stas    · Vómito de todo lo que usted come y ghada    · Dolor que no mejora o más praveen empeora kaycee las siguientes 8 a 12 horas    · Patrick en el vómito o heces que se diana negras y alquitranadas    · La piel o el fields de los ojos se vuelven amarillentos    · Grandes cantidades de sangrado vaginal que no es king periodo menstrual  El tratamiento para el dolor abdominal  puede llegar a incluir medicamentos para calmar king estómago, prevenir el vómito o disminuir el dolor  Programe cortney rene con king proveedor de [employer] se le haya indicado: Anote rios preguntas para que se acuerde de hacerlas kaycee rios visitas  ACUERDOS SOBRE KING CUIDADO:   Usted tiene el derecho de participar en la planificación de king cuidado  Aprenda todo lo que pueda sobre king condición y ashley darle tratamiento  Discuta con rios médicos rios opciones de tratamiento para juntos decidir el cuidado que usted quiere recibir  Usted siempre tiene el derecho a rechazar king tratamiento  Esta información es sólo para uso en educación  King intención no es darle un consejo médico sobre enfermedades o tratamientos   Colsulte con king Emmanuel Ahle farmacéutico antes de seguir cualquier régimen médico para saber si es seguro y efectivo para usted  © 2014 [address] [employer] is for End User's use only and may not be sold, redistributed or otherwise used for commercial purposes  All illustrations and images included in CareNotes® are the copyrighted property of A D A M , Inc  or Mickey Rosado  18-Oct-2019 18-Oct-2019 12:27

## 2019-11-09 NOTE — PROGRESS NOTES
[facility] Daily Note     Today's date: 2018  Patient name: Kinsey Vargas  : 1965  MRN: [id number]  Referring provider: Latoya Jameson MD  Dx:   Encounter Diagnosis     ICD-10-CM    1   Secondary lymphedema I89 0                   Subjective:  No new complaints    Objective: See treatment diary below    Precautions: anxiety, asthma, CA left breast, depression, HBP     Daily Treatment Diary      Manual  5/10 5/17  5/22  5/31  6/6  6/8  6/11  6/14  4/16  4/29   Manual drainage 13 min  17 min  9 min  x 10min X 12 min  x 15 min  10 min  14 min  12 min  14 min   mobilizations distraction and inferior glide  distraction and inferior glide Grade II  distraction and inferior glide  distraction and inferior glide As last time As before  as last visit  as before As  before As before   PROM left shoulder flexion only Flexion only  15 each  15 flexion recheck others  as ast visit  as last visit  as last visit  flexoin only  flexion only  flexion only    tubigrip size C/D                 forearm C upper arm D                                   ExerciDiary                        Wand flexion supine 5 # x 25  5# x25  5# x 25   5 # x  25  5# x 25  5# x 25  5# x 30  5# x 30  5# x 25  5# x 25                           wand extension  5# x 20  5# x 20 5# x20  5# x 25  5# x 25  5# x 25  5# x 25  5# x 25 5# x 20  5# x 20    wand internal rotation  5# x 25  5# x 25 5# x 25  5# x 25  5# x 25  5# x25  5# x 25  5# x 25  5# x 20  5# x 25                           shoulder flexion  5# x 20  5# x 10  5# x 20  5# x 20  5# x 20  5# x 20  5# x 20   5# x 25  5# x 15 5# x 20    shoulder abduction  3# x 15  3# x 15  3# x 20  3# x 20  3# x 20  3# x 20  3# x 20  3# x 20 3# x 18  3# x 15    door way stretch x5 with 10 sec hold  x5 with 10 sec hold  x 5  x 5  x5  x5  x5  x5  x 5 with 10 sec hold  x5 with 10 sec hold                                                                                                                                                                                                                                                                                                       Left shoulder swelling details:   14 cm above olecranon 32 cm  7 cm above 29 9 cm  Olecranon 26 6 cm  14 cm above ulnar styloid 25 6 cm  7 cm above 21 cm  Ulnar styloid 16 3 cm  mcp 19 1 cm  Right shoulder swelling details:   14 cm above olecranon 29 cm  7 cm above 27 cm  Olecranon 25 cm  14 cm above ulnar styloid 23 6 cm  7 cm above 18 8 cm  Ulnar styloid 15 6 cm  mcp 18 8         Assessment: Tolerated treatment well  Patient demonstrated fatigue post treatment  Patient leaves for [facility] for one month   Is on full home exercise program and edema is down but not fully back to normal but is wearing a compression sleeve daily      Plan: Discharge to home program and daily use of compression sleeve

## 2020-01-17 DIAGNOSIS — C50.919 MALIGNANT NEOPLASM OF FEMALE BREAST, UNSPECIFIED ESTROGEN RECEPTOR STATUS, UNSPECIFIED LATERALITY, UNSPECIFIED SITE OF BREAST (HCC): Primary | ICD-10-CM

## 2020-01-17 RX ORDER — TAMOXIFEN CITRATE 20 MG/1
20 TABLET ORAL DAILY
Qty: 90 TABLET | Refills: 2 | Status: SHIPPED | OUTPATIENT
Start: 2020-01-17 | End: 2020-11-12

## 2020-01-31 ENCOUNTER — OFFICE VISIT (OUTPATIENT)
Dept: PLASTIC SURGERY | Facility: CLINIC | Age: 55
End: 2020-01-31
Payer: COMMERCIAL

## 2020-01-31 VITALS — HEIGHT: 59 IN | WEIGHT: 125.25 LBS | BODY MASS INDEX: 25.25 KG/M2

## 2020-01-31 DIAGNOSIS — Z90.12 ACQUIRED ABSENCE OF BREAST AND ABSENT NIPPLE, LEFT: Primary | ICD-10-CM

## 2020-01-31 PROCEDURE — 99213 OFFICE O/P EST LOW 20 MIN: CPT | Performed by: PLASTIC SURGERY

## 2020-01-31 NOTE — PROGRESS NOTES
Assessment/Plan:      Diagnoses and all orders for this visit:    Acquired absence of breast and absent nipple, left        I discussed that I felt that her concerns could be improved with left breast mound reconstruction revision in order to widen footprint of the breast and also umbilicus scar revision  I discussed that this could be performed as outpatient surgery     I discussed risks including but not limited to: bleeding, infection, hematoma, seroma, wound breakdown, scar, need for further surgery, deformity, pain, numbness, weakness, asymmetry, injury to structures, and medical complications  The patient would like to proceed and therefore we will initiate the insurance approval process  A total of 3033 minutes of face-to-face time was spent in this encounter, of which >50% was spent in counseling  Robin Kaur MD   [facility]   [address] [place] [address]   [address] [facility], [address] [facility]   Office: [phone]          Subjective:     Patient ID: Romayne Duet is a 49 y o  female  Mrs Lugene Hammans is a 49years old female with acquired absence of left breast who is 4 months s/p left breast delayed reconstruction, presents for routine follow up  Patient manifest doing well, no discomfort, just returned from a large trip and has done well  She does have some areas that would to discuss to improve symmetry, specifically left breast shape to match native breast and her umbilicus area  Review of Systems      Objective:     Physical Exam   Constitutional: She is oriented to person, place, and time  She appears well-developed and well-nourished  Cardiovascular: Normal rate  Pulmonary/Chest: Effort normal    Left breast with great volume symmetry, breast footprint is narrower compare to native side  Abdominal: Soft  Musculoskeletal: Normal range of motion  Neurological: She is alert and oriented to person, place, and time

## 2020-03-06 ENCOUNTER — TELEPHONE (OUTPATIENT)
Dept: PLASTIC SURGERY | Facility: CLINIC | Age: 55
End: 2020-03-06

## 2020-03-11 ENCOUNTER — LAB (OUTPATIENT)
Dept: LAB | Facility: HOSPITAL | Age: 55
End: 2020-03-11
Attending: PLASTIC SURGERY
Payer: COMMERCIAL

## 2020-03-11 ENCOUNTER — HOSPITAL ENCOUNTER (OUTPATIENT)
Dept: NON INVASIVE DIAGNOSTICS | Facility: HOSPITAL | Age: 55
Discharge: HOME | End: 2020-03-11
Attending: PLASTIC SURGERY
Payer: COMMERCIAL

## 2020-03-11 DIAGNOSIS — Z98.890 STATUS POST TRANSVERSE RECTUS ABDOMINIS MUSCLE (TRAM) FLAP BREAST RECONSTRUCTION: ICD-10-CM

## 2020-03-11 LAB
ANION GAP SERPL CALCULATED.3IONS-SCNC: 9 MMOL/L (ref 4–13)
BASOPHILS # BLD AUTO: 0.03 THOUSANDS/ΜL (ref 0–0.1)
BASOPHILS NFR BLD AUTO: 1 % (ref 0–1)
BUN SERPL-MCNC: 23 MG/DL (ref 5–25)
CALCIUM SERPL-MCNC: 9.3 MG/DL (ref 8.3–10.1)
CHLORIDE SERPL-SCNC: 99 MMOL/L (ref 100–108)
CO2 SERPL-SCNC: 28 MMOL/L (ref 21–32)
CREAT SERPL-MCNC: 0.89 MG/DL (ref 0.6–1.3)
EOSINOPHIL # BLD AUTO: 0.14 THOUSAND/ΜL (ref 0–0.61)
EOSINOPHIL NFR BLD AUTO: 2 % (ref 0–6)
ERYTHROCYTE [DISTWIDTH] IN BLOOD BY AUTOMATED COUNT: 15.3 % (ref 11.6–15.1)
GFR SERPL CREATININE-BSD FRML MDRD: 74 ML/MIN/1.73SQ M
GLUCOSE SERPL-MCNC: 95 MG/DL (ref 65–140)
HCT VFR BLD AUTO: 35.3 % (ref 34.8–46.1)
HGB BLD-MCNC: 11.2 G/DL (ref 11.5–15.4)
IMM GRANULOCYTES # BLD AUTO: 0.02 THOUSAND/UL (ref 0–0.2)
IMM GRANULOCYTES NFR BLD AUTO: 0 % (ref 0–2)
LYMPHOCYTES # BLD AUTO: 1.98 THOUSANDS/ΜL (ref 0.6–4.47)
LYMPHOCYTES NFR BLD AUTO: 33 % (ref 14–44)
MCH RBC QN AUTO: 26.9 PG (ref 26.8–34.3)
MCHC RBC AUTO-ENTMCNC: 31.7 G/DL (ref 31.4–37.4)
MCV RBC AUTO: 85 FL (ref 82–98)
MONOCYTES # BLD AUTO: 0.49 THOUSAND/ΜL (ref 0.17–1.22)
MONOCYTES NFR BLD AUTO: 8 % (ref 4–12)
NEUTROPHILS # BLD AUTO: 3.41 THOUSANDS/ΜL (ref 1.85–7.62)
NEUTS SEG NFR BLD AUTO: 56 % (ref 43–75)
NRBC BLD AUTO-RTO: 0 /100 WBCS
PLATELET # BLD AUTO: 263 THOUSANDS/UL (ref 149–390)
PMV BLD AUTO: 11 FL (ref 8.9–12.7)
POTASSIUM SERPL-SCNC: 3 MMOL/L (ref 3.5–5.3)
RBC # BLD AUTO: 4.17 MILLION/UL (ref 3.81–5.12)
SODIUM SERPL-SCNC: 136 MMOL/L (ref 136–145)
WBC # BLD AUTO: 6.07 THOUSAND/UL (ref 4.31–10.16)

## 2020-03-11 PROCEDURE — 36415 COLL VENOUS BLD VENIPUNCTURE: CPT

## 2020-03-11 PROCEDURE — 85025 COMPLETE CBC W/AUTO DIFF WBC: CPT

## 2020-03-11 PROCEDURE — 80048 BASIC METABOLIC PNL TOTAL CA: CPT

## 2020-03-11 PROCEDURE — 93005 ELECTROCARDIOGRAM TRACING: CPT

## 2020-03-13 LAB
ATRIAL RATE: 88 BPM
P AXIS: -4 DEGREES
PR INTERVAL: 160 MS
QRS AXIS: -38 DEGREES
QRSD INTERVAL: 104 MS
QT INTERVAL: 356 MS
QTC INTERVAL: 430 MS
T WAVE AXIS: -4 DEGREES
VENTRICULAR RATE: 88 BPM

## 2020-03-13 PROCEDURE — 93010 ELECTROCARDIOGRAM REPORT: CPT | Performed by: INTERNAL MEDICINE

## 2020-03-15 PROCEDURE — NC001 PR NO CHARGE: Performed by: PHYSICIAN ASSISTANT

## 2020-03-16 ENCOUNTER — ANESTHESIA EVENT (OUTPATIENT)
Dept: PERIOP | Facility: HOSPITAL | Age: 55
End: 2020-03-16
Payer: COMMERCIAL

## 2020-03-16 RX ORDER — LISINOPRIL AND HYDROCHLOROTHIAZIDE 25; 20 MG/1; MG/1
1 TABLET ORAL DAILY
Status: ON HOLD | COMMUNITY
End: 2021-01-27

## 2020-03-18 ENCOUNTER — ANESTHESIA (OUTPATIENT)
Dept: PERIOP | Facility: HOSPITAL | Age: 55
End: 2020-03-18
Payer: COMMERCIAL

## 2020-06-04 DIAGNOSIS — Z11.59 SCREENING FOR VIRAL DISEASE: Primary | ICD-10-CM

## 2020-06-10 ENCOUNTER — HOSPITAL ENCOUNTER (OUTPATIENT)
Facility: HOSPITAL | Age: 55
Setting detail: OUTPATIENT SURGERY
Discharge: HOME | End: 2020-06-10
Attending: PLASTIC SURGERY | Admitting: PLASTIC SURGERY
Payer: COMMERCIAL

## 2020-06-10 VITALS
HEIGHT: 59 IN | DIASTOLIC BLOOD PRESSURE: 87 MMHG | BODY MASS INDEX: 25.2 KG/M2 | RESPIRATION RATE: 16 BRPM | TEMPERATURE: 96.4 F | WEIGHT: 125 LBS | SYSTOLIC BLOOD PRESSURE: 150 MMHG | HEART RATE: 105 BPM | OXYGEN SATURATION: 100 %

## 2020-06-10 DIAGNOSIS — Z90.12 ACQUIRED ABSENCE OF BREAST AND ABSENT NIPPLE, LEFT: ICD-10-CM

## 2020-06-10 DIAGNOSIS — Z11.59 SCREENING FOR VIRAL DISEASE: Primary | ICD-10-CM

## 2020-06-10 LAB
EXT PREGNANCY TEST URINE: NEGATIVE
EXT. CONTROL: NORMAL
SARS-COV-2 RNA RESP QL NAA+PROBE: NEGATIVE

## 2020-06-10 PROCEDURE — 81025 URINE PREGNANCY TEST: CPT | Performed by: PLASTIC SURGERY

## 2020-06-10 PROCEDURE — 13101 CMPLX RPR TRUNK 2.6-7.5 CM: CPT | Performed by: PLASTIC SURGERY

## 2020-06-10 PROCEDURE — 19380 REVJ RECONSTRUCTED BREAST: CPT | Performed by: PLASTIC SURGERY

## 2020-06-10 PROCEDURE — 99024 POSTOP FOLLOW-UP VISIT: CPT | Performed by: PLASTIC SURGERY

## 2020-06-10 PROCEDURE — U0003 INFECTIOUS AGENT DETECTION BY NUCLEIC ACID (DNA OR RNA); SEVERE ACUTE RESPIRATORY SYNDROME CORONAVIRUS 2 (SARS-COV-2) (CORONAVIRUS DISEASE [COVID-19]), AMPLIFIED PROBE TECHNIQUE, MAKING USE OF HIGH THROUGHPUT TECHNOLOGIES AS DESCRIBED BY CMS-2020-01-R: HCPCS | Performed by: PLASTIC SURGERY

## 2020-06-10 RX ORDER — TRAMADOL HYDROCHLORIDE 50 MG/1
50 TABLET ORAL EVERY 4 HOURS PRN
Status: DISCONTINUED | OUTPATIENT
Start: 2020-06-10 | End: 2020-06-10 | Stop reason: HOSPADM

## 2020-06-10 RX ORDER — MAGNESIUM HYDROXIDE 1200 MG/15ML
2000 LIQUID ORAL ONCE
Status: DISCONTINUED | OUTPATIENT
Start: 2020-06-10 | End: 2020-06-10

## 2020-06-10 RX ORDER — GABAPENTIN 100 MG/1
100 CAPSULE ORAL 3 TIMES DAILY
Qty: 30 CAPSULE | Refills: 0 | Status: SHIPPED | OUTPATIENT
Start: 2020-06-10 | End: 2021-01-19

## 2020-06-10 RX ORDER — ALBUTEROL SULFATE 2.5 MG/3ML
2.5 SOLUTION RESPIRATORY (INHALATION) ONCE AS NEEDED
Status: DISCONTINUED | OUTPATIENT
Start: 2020-06-10 | End: 2020-06-10 | Stop reason: HOSPADM

## 2020-06-10 RX ORDER — ONDANSETRON 2 MG/ML
4 INJECTION INTRAMUSCULAR; INTRAVENOUS ONCE AS NEEDED
Status: DISCONTINUED | OUTPATIENT
Start: 2020-06-10 | End: 2020-06-10 | Stop reason: HOSPADM

## 2020-06-10 RX ORDER — CEFAZOLIN SODIUM 1 G/50ML
1000 SOLUTION INTRAVENOUS ONCE
Status: COMPLETED | OUTPATIENT
Start: 2020-06-10 | End: 2020-06-10

## 2020-06-10 RX ORDER — ONDANSETRON 2 MG/ML
INJECTION INTRAMUSCULAR; INTRAVENOUS AS NEEDED
Status: DISCONTINUED | OUTPATIENT
Start: 2020-06-10 | End: 2020-06-10 | Stop reason: SURG

## 2020-06-10 RX ORDER — FENTANYL CITRATE/PF 50 MCG/ML
25 SYRINGE (ML) INJECTION
Status: DISCONTINUED | OUTPATIENT
Start: 2020-06-10 | End: 2020-06-10 | Stop reason: HOSPADM

## 2020-06-10 RX ORDER — GABAPENTIN 300 MG/1
300 CAPSULE ORAL ONCE
Status: COMPLETED | OUTPATIENT
Start: 2020-06-10 | End: 2020-06-10

## 2020-06-10 RX ORDER — DIPHENHYDRAMINE HYDROCHLORIDE 50 MG/ML
12.5 INJECTION INTRAMUSCULAR; INTRAVENOUS ONCE AS NEEDED
Status: DISCONTINUED | OUTPATIENT
Start: 2020-06-10 | End: 2020-06-10 | Stop reason: HOSPADM

## 2020-06-10 RX ORDER — KETOROLAC TROMETHAMINE 30 MG/ML
INJECTION, SOLUTION INTRAMUSCULAR; INTRAVENOUS AS NEEDED
Status: DISCONTINUED | OUTPATIENT
Start: 2020-06-10 | End: 2020-06-10 | Stop reason: SURG

## 2020-06-10 RX ORDER — HYDROMORPHONE HCL/PF 1 MG/ML
0.2 SYRINGE (ML) INJECTION
Status: DISCONTINUED | OUTPATIENT
Start: 2020-06-10 | End: 2020-06-10 | Stop reason: HOSPADM

## 2020-06-10 RX ORDER — LIDOCAINE HYDROCHLORIDE 10 MG/ML
INJECTION, SOLUTION EPIDURAL; INFILTRATION; INTRACAUDAL; PERINEURAL AS NEEDED
Status: DISCONTINUED | OUTPATIENT
Start: 2020-06-10 | End: 2020-06-10 | Stop reason: SURG

## 2020-06-10 RX ORDER — MIDAZOLAM HYDROCHLORIDE 2 MG/2ML
INJECTION, SOLUTION INTRAMUSCULAR; INTRAVENOUS AS NEEDED
Status: DISCONTINUED | OUTPATIENT
Start: 2020-06-10 | End: 2020-06-10 | Stop reason: SURG

## 2020-06-10 RX ORDER — SENNOSIDES 8.6 MG
650 CAPSULE ORAL EVERY 8 HOURS PRN
Qty: 30 TABLET | Refills: 0 | Status: SHIPPED | OUTPATIENT
Start: 2020-06-10 | End: 2020-06-20

## 2020-06-10 RX ORDER — PROPOFOL 10 MG/ML
INJECTION, EMULSION INTRAVENOUS AS NEEDED
Status: DISCONTINUED | OUTPATIENT
Start: 2020-06-10 | End: 2020-06-10 | Stop reason: SURG

## 2020-06-10 RX ORDER — SODIUM CHLORIDE, SODIUM LACTATE, POTASSIUM CHLORIDE, CALCIUM CHLORIDE 600; 310; 30; 20 MG/100ML; MG/100ML; MG/100ML; MG/100ML
50 INJECTION, SOLUTION INTRAVENOUS CONTINUOUS
Status: DISCONTINUED | OUTPATIENT
Start: 2020-06-10 | End: 2020-06-10 | Stop reason: HOSPADM

## 2020-06-10 RX ORDER — ACETAMINOPHEN 325 MG/1
975 TABLET ORAL ONCE
Status: COMPLETED | OUTPATIENT
Start: 2020-06-10 | End: 2020-06-10

## 2020-06-10 RX ORDER — TRAMADOL HYDROCHLORIDE 50 MG/1
50 TABLET ORAL EVERY 8 HOURS PRN
Qty: 21 TABLET | Refills: 0 | Status: SHIPPED | OUTPATIENT
Start: 2020-06-10 | End: 2020-06-17

## 2020-06-10 RX ORDER — FENTANYL CITRATE 50 UG/ML
INJECTION, SOLUTION INTRAMUSCULAR; INTRAVENOUS AS NEEDED
Status: DISCONTINUED | OUTPATIENT
Start: 2020-06-10 | End: 2020-06-10 | Stop reason: SURG

## 2020-06-10 RX ORDER — DEXAMETHASONE SODIUM PHOSPHATE 10 MG/ML
INJECTION, SOLUTION INTRAMUSCULAR; INTRAVENOUS AS NEEDED
Status: DISCONTINUED | OUTPATIENT
Start: 2020-06-10 | End: 2020-06-10 | Stop reason: SURG

## 2020-06-10 RX ADMIN — CEFAZOLIN SODIUM 1000 MG: 1 SOLUTION INTRAVENOUS at 08:45

## 2020-06-10 RX ADMIN — FENTANYL CITRATE 12.5 MCG: 50 INJECTION, SOLUTION INTRAMUSCULAR; INTRAVENOUS at 09:29

## 2020-06-10 RX ADMIN — PROPOFOL 160 MG: 10 INJECTION, EMULSION INTRAVENOUS at 08:39

## 2020-06-10 RX ADMIN — FENTANYL CITRATE 25 MCG: 50 INJECTION, SOLUTION INTRAMUSCULAR; INTRAVENOUS at 09:15

## 2020-06-10 RX ADMIN — PROPOFOL 10 MG: 10 INJECTION, EMULSION INTRAVENOUS at 10:04

## 2020-06-10 RX ADMIN — SODIUM CHLORIDE, SODIUM LACTATE, POTASSIUM CHLORIDE, AND CALCIUM CHLORIDE: .6; .31; .03; .02 INJECTION, SOLUTION INTRAVENOUS at 10:10

## 2020-06-10 RX ADMIN — ONDANSETRON 4 MG: 2 INJECTION INTRAMUSCULAR; INTRAVENOUS at 09:33

## 2020-06-10 RX ADMIN — MIDAZOLAM 2 MG: 1 INJECTION INTRAMUSCULAR; INTRAVENOUS at 08:37

## 2020-06-10 RX ADMIN — FENTANYL CITRATE 12.5 MCG: 50 INJECTION, SOLUTION INTRAMUSCULAR; INTRAVENOUS at 09:48

## 2020-06-10 RX ADMIN — FENTANYL CITRATE 12.5 MCG: 50 INJECTION, SOLUTION INTRAMUSCULAR; INTRAVENOUS at 09:35

## 2020-06-10 RX ADMIN — ACETAMINOPHEN 975 MG: 325 TABLET ORAL at 06:47

## 2020-06-10 RX ADMIN — SODIUM CHLORIDE, SODIUM LACTATE, POTASSIUM CHLORIDE, AND CALCIUM CHLORIDE: .6; .31; .03; .02 INJECTION, SOLUTION INTRAVENOUS at 07:58

## 2020-06-10 RX ADMIN — GABAPENTIN 300 MG: 300 CAPSULE ORAL at 06:47

## 2020-06-10 RX ADMIN — KETOROLAC TROMETHAMINE 15 MG: 30 INJECTION, SOLUTION INTRAMUSCULAR at 09:55

## 2020-06-10 RX ADMIN — LIDOCAINE HYDROCHLORIDE 50 MG: 10 INJECTION, SOLUTION EPIDURAL; INFILTRATION; INTRACAUDAL; PERINEURAL at 08:39

## 2020-06-10 RX ADMIN — FENTANYL CITRATE 25 MCG: 50 INJECTION, SOLUTION INTRAMUSCULAR; INTRAVENOUS at 08:54

## 2020-06-10 RX ADMIN — FENTANYL CITRATE 12.5 MCG: 50 INJECTION, SOLUTION INTRAMUSCULAR; INTRAVENOUS at 10:12

## 2020-06-10 RX ADMIN — DEXAMETHASONE SODIUM PHOSPHATE 4 MG: 10 INJECTION, SOLUTION INTRAMUSCULAR; INTRAVENOUS at 08:39

## 2020-06-10 RX ADMIN — PROPOFOL 10 MG: 10 INJECTION, EMULSION INTRAVENOUS at 10:00

## 2020-06-18 ENCOUNTER — OFFICE VISIT (OUTPATIENT)
Dept: PLASTIC SURGERY | Facility: CLINIC | Age: 55
End: 2020-06-18

## 2020-06-18 VITALS — TEMPERATURE: 97.3 F

## 2020-06-18 DIAGNOSIS — Z98.890 STATUS POST TRANSVERSE RECTUS ABDOMINIS MUSCLE (TRAM) FLAP BREAST RECONSTRUCTION: Primary | ICD-10-CM

## 2020-06-18 PROCEDURE — 99024 POSTOP FOLLOW-UP VISIT: CPT | Performed by: PHYSICIAN ASSISTANT

## 2020-11-12 ENCOUNTER — OFFICE VISIT (OUTPATIENT)
Dept: PLASTIC SURGERY | Facility: CLINIC | Age: 55
End: 2020-11-12
Payer: COMMERCIAL

## 2020-11-12 VITALS — BODY MASS INDEX: 25.2 KG/M2 | TEMPERATURE: 97.4 F | HEIGHT: 59 IN | WEIGHT: 125 LBS

## 2020-11-12 DIAGNOSIS — Z90.12 ACQUIRED ABSENCE OF BREAST AND ABSENT NIPPLE, LEFT: Primary | ICD-10-CM

## 2020-11-12 DIAGNOSIS — C50.919 MALIGNANT NEOPLASM OF FEMALE BREAST, UNSPECIFIED ESTROGEN RECEPTOR STATUS, UNSPECIFIED LATERALITY, UNSPECIFIED SITE OF BREAST (HCC): ICD-10-CM

## 2020-11-12 PROCEDURE — 99214 OFFICE O/P EST MOD 30 MIN: CPT | Performed by: PLASTIC SURGERY

## 2020-11-12 RX ORDER — TAMOXIFEN CITRATE 20 MG/1
TABLET ORAL
Qty: 90 TABLET | Refills: 2 | Status: SHIPPED | OUTPATIENT
Start: 2020-11-12 | End: 2021-08-24

## 2021-01-21 ENCOUNTER — LAB (OUTPATIENT)
Dept: LAB | Facility: HOSPITAL | Age: 56
End: 2021-01-21
Attending: PLASTIC SURGERY
Payer: COMMERCIAL

## 2021-01-21 DIAGNOSIS — Z85.3 PERSONAL HISTORY OF MALIGNANT NEOPLASM OF BREAST: ICD-10-CM

## 2021-01-21 LAB
ANION GAP SERPL CALCULATED.3IONS-SCNC: 8 MMOL/L (ref 4–13)
BUN SERPL-MCNC: 21 MG/DL (ref 5–25)
CALCIUM SERPL-MCNC: 9.4 MG/DL (ref 8.3–10.1)
CHLORIDE SERPL-SCNC: 101 MMOL/L (ref 100–108)
CO2 SERPL-SCNC: 27 MMOL/L (ref 21–32)
CREAT SERPL-MCNC: 0.86 MG/DL (ref 0.6–1.3)
ERYTHROCYTE [DISTWIDTH] IN BLOOD BY AUTOMATED COUNT: 12.7 % (ref 11.6–15.1)
GFR SERPL CREATININE-BSD FRML MDRD: 77 ML/MIN/1.73SQ M
GLUCOSE SERPL-MCNC: 116 MG/DL (ref 65–140)
HCT VFR BLD AUTO: 37.6 % (ref 34.8–46.1)
HGB BLD-MCNC: 12.1 G/DL (ref 11.5–15.4)
MCH RBC QN AUTO: 28.9 PG (ref 26.8–34.3)
MCHC RBC AUTO-ENTMCNC: 32.2 G/DL (ref 31.4–37.4)
MCV RBC AUTO: 90 FL (ref 82–98)
PLATELET # BLD AUTO: 230 THOUSANDS/UL (ref 149–390)
PMV BLD AUTO: 11.2 FL (ref 8.9–12.7)
POTASSIUM SERPL-SCNC: 3.6 MMOL/L (ref 3.5–5.3)
RBC # BLD AUTO: 4.18 MILLION/UL (ref 3.81–5.12)
SODIUM SERPL-SCNC: 136 MMOL/L (ref 136–145)
WBC # BLD AUTO: 6.27 THOUSAND/UL (ref 4.31–10.16)

## 2021-01-21 PROCEDURE — 85027 COMPLETE CBC AUTOMATED: CPT

## 2021-01-21 PROCEDURE — 80048 BASIC METABOLIC PNL TOTAL CA: CPT

## 2021-01-21 PROCEDURE — 36415 COLL VENOUS BLD VENIPUNCTURE: CPT

## 2021-01-27 ENCOUNTER — ANESTHESIA EVENT (OUTPATIENT)
Dept: PERIOP | Facility: HOSPITAL | Age: 56
End: 2021-01-27
Payer: COMMERCIAL

## 2021-01-27 ENCOUNTER — ANESTHESIA (OUTPATIENT)
Dept: PERIOP | Facility: HOSPITAL | Age: 56
End: 2021-01-27
Payer: COMMERCIAL

## 2021-01-27 ENCOUNTER — HOSPITAL ENCOUNTER (OUTPATIENT)
Facility: HOSPITAL | Age: 56
Setting detail: OUTPATIENT SURGERY
Discharge: HOME | End: 2021-01-27
Attending: PLASTIC SURGERY | Admitting: PLASTIC SURGERY
Payer: COMMERCIAL

## 2021-01-27 VITALS
BODY MASS INDEX: 25.2 KG/M2 | SYSTOLIC BLOOD PRESSURE: 122 MMHG | OXYGEN SATURATION: 99 % | HEART RATE: 86 BPM | WEIGHT: 125 LBS | RESPIRATION RATE: 16 BRPM | HEIGHT: 59 IN | DIASTOLIC BLOOD PRESSURE: 69 MMHG | TEMPERATURE: 97 F

## 2021-01-27 VITALS — HEART RATE: 89 BPM

## 2021-01-27 LAB
EXT PREGNANCY TEST URINE: NEGATIVE
EXT. CONTROL: NORMAL

## 2021-01-27 PROCEDURE — 81025 URINE PREGNANCY TEST: CPT | Performed by: PLASTIC SURGERY

## 2021-01-27 PROCEDURE — 19380 REVJ RECONSTRUCTED BREAST: CPT | Performed by: PHYSICIAN ASSISTANT

## 2021-01-27 PROCEDURE — 99024 POSTOP FOLLOW-UP VISIT: CPT | Performed by: PLASTIC SURGERY

## 2021-01-27 PROCEDURE — 19380 REVJ RECONSTRUCTED BREAST: CPT | Performed by: PLASTIC SURGERY

## 2021-01-27 RX ORDER — ACETAMINOPHEN 325 MG/1
975 TABLET ORAL ONCE
Status: COMPLETED | OUTPATIENT
Start: 2021-01-27 | End: 2021-01-27

## 2021-01-27 RX ORDER — LIDOCAINE HYDROCHLORIDE AND EPINEPHRINE 10; 10 MG/ML; UG/ML
INJECTION, SOLUTION INFILTRATION; PERINEURAL AS NEEDED
Status: DISCONTINUED | OUTPATIENT
Start: 2021-01-27 | End: 2021-01-27 | Stop reason: HOSPADM

## 2021-01-27 RX ORDER — EPHEDRINE SULFATE 50 MG/ML
INJECTION INTRAVENOUS AS NEEDED
Status: DISCONTINUED | OUTPATIENT
Start: 2021-01-27 | End: 2021-01-27

## 2021-01-27 RX ORDER — MIDAZOLAM HYDROCHLORIDE 2 MG/2ML
INJECTION, SOLUTION INTRAMUSCULAR; INTRAVENOUS AS NEEDED
Status: DISCONTINUED | OUTPATIENT
Start: 2021-01-27 | End: 2021-01-27

## 2021-01-27 RX ORDER — TRAMADOL HYDROCHLORIDE 50 MG/1
50 TABLET ORAL EVERY 4 HOURS PRN
Status: CANCELLED | OUTPATIENT
Start: 2021-01-27

## 2021-01-27 RX ORDER — FENTANYL CITRATE/PF 50 MCG/ML
25 SYRINGE (ML) INJECTION
Status: DISCONTINUED | OUTPATIENT
Start: 2021-01-27 | End: 2021-01-27 | Stop reason: HOSPADM

## 2021-01-27 RX ORDER — HYDROMORPHONE HCL/PF 1 MG/ML
0.2 SYRINGE (ML) INJECTION
Status: DISCONTINUED | OUTPATIENT
Start: 2021-01-27 | End: 2021-01-27 | Stop reason: HOSPADM

## 2021-01-27 RX ORDER — LIDOCAINE HYDROCHLORIDE 10 MG/ML
INJECTION, SOLUTION EPIDURAL; INFILTRATION; INTRACAUDAL; PERINEURAL AS NEEDED
Status: DISCONTINUED | OUTPATIENT
Start: 2021-01-27 | End: 2021-01-27

## 2021-01-27 RX ORDER — KETOROLAC TROMETHAMINE 30 MG/ML
INJECTION, SOLUTION INTRAMUSCULAR; INTRAVENOUS AS NEEDED
Status: DISCONTINUED | OUTPATIENT
Start: 2021-01-27 | End: 2021-01-27

## 2021-01-27 RX ORDER — DEXAMETHASONE SODIUM PHOSPHATE 10 MG/ML
INJECTION, SOLUTION INTRAMUSCULAR; INTRAVENOUS AS NEEDED
Status: DISCONTINUED | OUTPATIENT
Start: 2021-01-27 | End: 2021-01-27

## 2021-01-27 RX ORDER — FENTANYL CITRATE 50 UG/ML
INJECTION, SOLUTION INTRAMUSCULAR; INTRAVENOUS AS NEEDED
Status: DISCONTINUED | OUTPATIENT
Start: 2021-01-27 | End: 2021-01-27

## 2021-01-27 RX ORDER — SODIUM CHLORIDE, SODIUM LACTATE, POTASSIUM CHLORIDE, CALCIUM CHLORIDE 600; 310; 30; 20 MG/100ML; MG/100ML; MG/100ML; MG/100ML
50 INJECTION, SOLUTION INTRAVENOUS CONTINUOUS
Status: DISCONTINUED | OUTPATIENT
Start: 2021-01-27 | End: 2021-01-27 | Stop reason: HOSPADM

## 2021-01-27 RX ORDER — CEFAZOLIN SODIUM 1 G/50ML
1000 SOLUTION INTRAVENOUS ONCE
Status: COMPLETED | OUTPATIENT
Start: 2021-01-27 | End: 2021-01-27

## 2021-01-27 RX ORDER — KETAMINE HCL IN NACL, ISO-OSM 100MG/10ML
SYRINGE (ML) INJECTION AS NEEDED
Status: DISCONTINUED | OUTPATIENT
Start: 2021-01-27 | End: 2021-01-27

## 2021-01-27 RX ORDER — LIDOCAINE HYDROCHLORIDE 10 MG/ML
0.5 INJECTION, SOLUTION EPIDURAL; INFILTRATION; INTRACAUDAL; PERINEURAL ONCE AS NEEDED
Status: COMPLETED | OUTPATIENT
Start: 2021-01-27 | End: 2021-01-27

## 2021-01-27 RX ORDER — PROPOFOL 10 MG/ML
INJECTION, EMULSION INTRAVENOUS AS NEEDED
Status: DISCONTINUED | OUTPATIENT
Start: 2021-01-27 | End: 2021-01-27

## 2021-01-27 RX ORDER — ONDANSETRON 2 MG/ML
4 INJECTION INTRAMUSCULAR; INTRAVENOUS ONCE AS NEEDED
Status: DISCONTINUED | OUTPATIENT
Start: 2021-01-27 | End: 2021-01-27 | Stop reason: HOSPADM

## 2021-01-27 RX ORDER — SODIUM CHLORIDE, SODIUM LACTATE, POTASSIUM CHLORIDE, CALCIUM CHLORIDE 600; 310; 30; 20 MG/100ML; MG/100ML; MG/100ML; MG/100ML
20 INJECTION, SOLUTION INTRAVENOUS CONTINUOUS
Status: DISCONTINUED | OUTPATIENT
Start: 2021-01-27 | End: 2021-01-27 | Stop reason: HOSPADM

## 2021-01-27 RX ORDER — GABAPENTIN 300 MG/1
300 CAPSULE ORAL ONCE
Status: COMPLETED | OUTPATIENT
Start: 2021-01-27 | End: 2021-01-27

## 2021-01-27 RX ORDER — ONDANSETRON 2 MG/ML
INJECTION INTRAMUSCULAR; INTRAVENOUS AS NEEDED
Status: DISCONTINUED | OUTPATIENT
Start: 2021-01-27 | End: 2021-01-27

## 2021-01-27 RX ADMIN — MIDAZOLAM 2 MG: 1 INJECTION INTRAMUSCULAR; INTRAVENOUS at 11:08

## 2021-01-27 RX ADMIN — KETOROLAC TROMETHAMINE 15 MG: 30 INJECTION, SOLUTION INTRAMUSCULAR at 11:53

## 2021-01-27 RX ADMIN — ACETAMINOPHEN 975 MG: 325 TABLET, FILM COATED ORAL at 08:27

## 2021-01-27 RX ADMIN — Medication 10 MG: at 11:52

## 2021-01-27 RX ADMIN — LIDOCAINE HYDROCHLORIDE 0.5 ML: 10 INJECTION, SOLUTION EPIDURAL; INFILTRATION; INTRACAUDAL; PERINEURAL at 08:40

## 2021-01-27 RX ADMIN — DEXAMETHASONE SODIUM PHOSPHATE 10 MG: 10 INJECTION, SOLUTION INTRAMUSCULAR; INTRAVENOUS at 11:11

## 2021-01-27 RX ADMIN — PROPOFOL 50 MG: 10 INJECTION, EMULSION INTRAVENOUS at 11:25

## 2021-01-27 RX ADMIN — SODIUM CHLORIDE, SODIUM LACTATE, POTASSIUM CHLORIDE, AND CALCIUM CHLORIDE 50 ML/HR: .6; .31; .03; .02 INJECTION, SOLUTION INTRAVENOUS at 08:40

## 2021-01-27 RX ADMIN — CEFAZOLIN SODIUM 1000 MG: 1 SOLUTION INTRAVENOUS at 11:19

## 2021-01-27 RX ADMIN — FENTANYL CITRATE 25 MCG: 50 INJECTION INTRAMUSCULAR; INTRAVENOUS at 11:08

## 2021-01-27 RX ADMIN — SODIUM CHLORIDE, SODIUM LACTATE, POTASSIUM CHLORIDE, AND CALCIUM CHLORIDE: .6; .31; .03; .02 INJECTION, SOLUTION INTRAVENOUS at 11:11

## 2021-01-27 RX ADMIN — Medication 20 MG: at 11:39

## 2021-01-27 RX ADMIN — LIDOCAINE HYDROCHLORIDE 50 MG: 10 INJECTION, SOLUTION EPIDURAL; INFILTRATION; INTRACAUDAL; PERINEURAL at 11:14

## 2021-01-27 RX ADMIN — PROPOFOL 150 MG: 10 INJECTION, EMULSION INTRAVENOUS at 11:13

## 2021-01-27 RX ADMIN — EPHEDRINE SULFATE 10 MG: 50 INJECTION, SOLUTION INTRAVENOUS at 11:47

## 2021-01-27 RX ADMIN — ONDANSETRON 4 MG: 2 INJECTION INTRAMUSCULAR; INTRAVENOUS at 11:08

## 2021-01-27 RX ADMIN — GABAPENTIN 300 MG: 300 CAPSULE ORAL at 08:27

## 2021-01-27 RX ADMIN — EPHEDRINE SULFATE 10 MG: 50 INJECTION, SOLUTION INTRAVENOUS at 11:36

## 2021-01-27 NOTE — INTERVAL H&P NOTE
H&P reviewed  After examining the patient I find no changes in the patients condition since the H&P had been written      Vitals:    01/27/21 0852   BP: 152/88   Pulse: 99   Resp: 16   Temp: 97 6 °F (36 4 °C)   SpO2: 100%

## 2021-01-27 NOTE — DISCHARGE INSTRUCTIONS
[facility]  Dr Sadie Lopez [address], [address]  Phone: [phone]     Postoperative Instructions for Outpatient Surgery     These instructions are being provided by your doctor to give you basic guidelines during your post-op recovery  Please let our office know if your contact information has changed  Please call the office today for an appointment in two weeks for your first postoperative care visit  Dressings: Your incision is covered with surgical glue  Wear surgical bra after showers  Activity Restrictions: No strenuous activities  Bathing: You may shower in 48 hours  Pat incision dry  Please do not rub incision  Medications:    Resume pre-op medications     You may take tylenol, aleve, or ibuprofen for pain control

## 2021-01-27 NOTE — OP NOTE
OPERATIVE REPORT  PATIENT NAME: Valente Land    :  1965  MRN: [id number]  Pt Location: [facility] OR ROOM 06    SURGERY DATE: 2021    Surgeon(s) and Role:     * Joseline Cartagena MD - Primary     * Darci Prescott PA-C - Assisting    Preop Diagnosis:  Personal history of malignant neoplasm of breast [Z85 3]    Post-Op Diagnosis Codes:     * Personal history of malignant neoplasm of breast [Z85 3]    Procedure(s) (LRB):  LEFT BREAST MOUND RECONSTRUCTION REVISION (Left)    Specimen(s):  * No specimens in log *    Estimated Blood Loss:   Minimal    Drains:  Closed/Suction Drain Right Thigh Bulb 15 Fr  (Active)   Number of days: 510       Closed/Suction Drain Left Thigh Bulb 15 Fr  (Active)   Number of days: 510       Closed/Suction Drain Left Breast Bulb 15 Fr  (Active)   Number of days: 510       Negative Pressure Wound Therapy (V A C ) Abdomen (Active)   Number of days: 510       Anesthesia Type:   General    Operative Indications:  Personal history of malignant neoplasm of breast [Z85 3]      Operative Findings:  Left breast flap was elevated to improve contour     Complications:   None    Procedure and Technique:  Mrs Daniel Tao is a 67-year-old female with a history of acquired absence of the left breast and now with contour deformity and she would like to improve presented for breast mound revision  Risks and benefits of the procedure were discussed in detail with the patient and informed consent was verified  Patient was met in the preoperative holding area and all last minute questions were properly answered and addressed  Site of surgery was marked and verified in conjunction with the patient she was taken to the operative theater placed in supine position  After smooth anesthesia was then induced the chest was then prepped and draped in usual sterile fashion a time-out was then performed    Patient was then placed in the sitting position and tailor tacking of the skin was then performed using a 2-0 Vicryl on the cephalad aspect of the breast mound in order to elevate the flap and improve the contour on the anterior surface as discussed with the patient  After proper contour was then confirmed sutures was then removed and elliptical excision pattern the skin paddle from the flap was then removed from 11:00 to 4:00 o'clock  The area was infiltrated with 10 mL of lidocaine with epinephrine, incision was then made a 10 blade down to the subcutaneous tissue and using electrocautery the skin paddle was then removed  Slight debulking of the excess adipose tissue on the upper pole was removed as well with electrocautery until better contour was then obtained  The posterior aspect of the flap was then raised for approximately 3 cm inferiorly off the chest wall in order to allow advancement and primary closure without undue tension  Layered closure was then performed with interrupted 2-0 PDS on the Donald's followed by deep dermal 3-0 Monocryl and a running 4-0 Stratafix subcuticular layer  Surgical glue, Steri-Strips and a bra was then applied the conclusion of the case  Satisfactory improvement of the breast shape was obtained at the conclusion of the case       I was present for the entire procedure, I was present for all critical portions of the procedure, A qualified resident physician was not available and A physician assistant was required during the procedure for retraction tissue handling,dissection and suturing    Patient Disposition:  PACU  and hemodynamically stable    SIGNATURE: You Cruz MD  DATE: January 27, 2021  TIME: 12:16 PM

## 2021-01-27 NOTE — H&P
Assessment/Plan   Diagnoses and all orders for this visit:     Acquired absence of breast and absent nipple, left     I discussed with Naz Freed the general goals of breast reconstruction is to have the patient look normal in clothing  When naked, there will always be scars and other stigmata of the breast reconstruction process  I explained that overall she has acceptable volume symmetries with obvious shape differences  There is more upper pole fullness in her left breast with small concavity on right upper pole  I discussed that I do not recommend fat grafting on right breast to risk of graft poor take and confounding with mammogram in the future       I discussed liposuction of debulking left upper pole for better match followed by nipple areolar reconstruction with the patient  I discussed that this could be performed as outpatient surgery       I discussed risks including but not limited to: bleeding, infection, hematoma, seroma, wound breakdown, scar, need for further surgery, deformity, pain, numbness, weakness, asymmetry, injury to structures, and medical complications      I discussed that the scar involved would be larger than the maximal dimension of the lesion itself      The patient would like to proceed and therefore we will initiate the insurance approval process      A total of 30 minutes of face-to-face time was spent in this encounter, of which >50% was spent in counseling   [address]   [address] [facility] [address] [facility]   Office: [phone]              Subjective   Patient ID: Ottoniel Montoya is a 48 y o  female          Vitals:     11/12/20 0913   Temp: (!) 97 4 °F (36 3 °C)      Mrs Wandy Tirado is a 48years old female who presents for routine follow after breast reconstruction       Patient manifest doing well overall and no discomfort   She has questions about her bilateral upper breast asymmetries for potential improvement  She is otherwise happy with results          The following portions of the patient's history were reviewed and updated as appropriate: allergies, current medications, past family history, past medical history, past social history, past surgical history and problem list      Review of Systems   Constitutional: Negative  HENT: Negative  Eyes: Negative  Respiratory: Negative  Cardiovascular: Negative  Gastrointestinal: Negative  Endocrine: Negative  Genitourinary: Negative  Musculoskeletal: Negative  Skin: Negative  Allergic/Immunologic: Negative  Neurological: Negative  Hematological: Negative  Psychiatric/Behavioral: Negative           Objective   Physical Exam   Vitals reviewed  Constitutional  She appears well-developed and well-nourished       Eyes  Pupils are equal, round, and reactive to light  System normal          Cardiovascular: Normal rate       Pulmonary/Chest  Effort normal       Psychiatric  She has a normal mood and affect  Her behavior is normal  Thought content normal       Breast  Breast Measurements:    Right Left   A: Sternal notch to nipple distance (cm)       B: Midsternum to nipple distance (cm)       C: Midclavicle to nipple distance (cm)       D: Nipple to inframammary fold (cm)       E: Base width (cm)       F: Inframammary distance (cm)         Right Left   Areolar diameter (cm)       Nipple diameter (cm)       Superior tissue pinch test (cm)       Breast ptosis (grade 0-3) 0 0            Her breasts are asymmetrical  Her left breast is larger than the right     Additional breast conditions include the following:      Left Breast: Her left breast has a scar

## 2021-01-27 NOTE — ANESTHESIA POSTPROCEDURE EVALUATION
Post-Op Assessment Note    CV Status:  Stable    Pain management: adequate     Mental Status:  Awake and sleepy   Hydration Status:  Euvolemic   PONV Controlled:  Controlled   Airway Patency:  Patent      Post Op Vitals Reviewed: Yes      Staff: Anesthesiologist, with CRNAs         No complications documented      BP   141/90   Temp   96 6   Pulse  88   Resp   18   SpO2   99

## 2021-01-27 NOTE — ANESTHESIA PREPROCEDURE EVALUATION
Procedure:  LEFT BREAST MOUND RECONSTRUCTION REVISION (Left Breast)  LEFT NIPPLE RECONSTRUCTION (Left Breast)    Relevant Problems   CARDIO   (+) Benign essential hypertension   (+) Essential hypertension      GYN   (+) Malignant neoplasm of overlapping sites of left breast in female, estrogen receptor positive (HCC)   (+) Malignant neoplasm of overlapping sites of left female breast (HCC)      HEMATOLOGY   (+) Anemia   (+) Iron deficiency anemia      NEURO/PSYCH   (+) Anxiety   (+) Major depressive disorder with single episode, in partial remission ([facility] [address] )      PULMONARY   (+) Asthma   (+) Pneumonia      CAD/PCI/MI/CHF -- denies  COPD/ASTHMA/THUY -- remote, no hospitalizations  PROBS WITH PRIOR ANESTHESIA -- denies  NPO STATUS CONFIRMED      Lab Results   Component Value Date    WBC 6 27 01/21/2021    HGB 12 1 01/21/2021     01/21/2021     Lab Results   Component Value Date    SODIUM 136 01/21/2021    K 3 6 01/21/2021    BUN 21 01/21/2021    CREATININE 0 86 01/21/2021    EGFR 77 01/21/2021     Lab Results   Component Value Date    PTT 31 10/11/2016      Lab Results   Component Value Date    INR 0 99 10/11/2016       Blood type O    No results found for: HGBA1C          Physical Exam    Airway    Mallampati score: II  TM Distance: >3 FB       Dental   No notable dental hx     Cardiovascular      Pulmonary      Other Findings        Anesthesia Plan  ASA Score- 2     Anesthesia Type- general with ASA Monitors  Additional Monitors:   Airway Plan: LMA  Comment:  KEN Varma , have personally seen and evaluated the patient prior to anesthetic care  I have reviewed the pre-anesthetic record, and other medical records if appropriate to the anesthetic care  If a CRNA is involved in the case, I have reviewed the CRNA assessment, if present, and agree        Risks/benefits and alternatives discussed with patient including possible PONV, sore throat, and possibility of rare anesthetic and surgical emergencies  CONSENT OBTAINED VIA TRANSLATION WITH DR Betty Chambers    Plan Factors-Exercise tolerance (METS): >4 METS  Chart reviewed  EKG reviewed  Imaging results reviewed  Existing labs reviewed  Patient summary reviewed  Patient is not a current smoker  Patient not instructed to abstain from smoking on day of procedure  Patient did not smoke on day of surgery  Induction- intravenous  Postoperative Plan-     Informed Consent- Anesthetic plan and risks discussed with patient  I personally reviewed this patient with the CRNA  Discussed and agreed on the Anesthesia Plan with the CRNA  Cecilia Gongora

## 2021-02-05 ENCOUNTER — TELEPHONE (OUTPATIENT)
Dept: OTHER | Facility: OTHER | Age: 56
End: 2021-02-05

## 2021-02-05 NOTE — TELEPHONE ENCOUNTER
Patient needs to cancel her appointment this morning due to the snow  Please give the patient a call back to reschedule  She needs a

## 2021-02-17 ENCOUNTER — OFFICE VISIT (OUTPATIENT)
Dept: PLASTIC SURGERY | Facility: CLINIC | Age: 56
End: 2021-02-17

## 2021-02-17 VITALS — BODY MASS INDEX: 27.62 KG/M2 | WEIGHT: 137 LBS | TEMPERATURE: 97.2 F | HEIGHT: 59 IN

## 2021-02-17 DIAGNOSIS — Z98.890 STATUS POST TRANSVERSE RECTUS ABDOMINIS MUSCLE (TRAM) FLAP BREAST RECONSTRUCTION: Primary | ICD-10-CM

## 2021-02-17 PROCEDURE — 99024 POSTOP FOLLOW-UP VISIT: CPT | Performed by: PHYSICIAN ASSISTANT

## 2021-02-17 NOTE — PROGRESS NOTES
POST-OP EVALUATION  2021    Subjective   Jenene Nipple is a 48 y o  female is here today for routine post-operative follow up   21 1108         Procedure: LEFT BREAST MOUND RECONSTRUCTION REVISION (Left Breast)     06/10/20 0837         Procedures:    Olivia Herrera  LEFT BREAST MOUND REVISION (Left Breast)       SCAR REVISION ABDOMEN (N/A Abdomen)       TRANSFER FAT/INJECTION GRAFT (N/A )     19 0755         Procedure: BREAST RECONSTRUCTION W/FLAP FREE DEEP INFERIOR EPIGASTRIC  (MARU)/ MESH PLACEMENT (Left Back)     Pt seems happy with result  (she speaks Occitan and  phone not working)      Past Medical History:   Diagnosis Date    Anxiety     Asthma     Breast cancer ([address] )     left breast chemo only     Depression     History of chemotherapy     Hypertension     Insomnia      Past Surgical History:   Procedure Laterality Date     SECTION      LIPOSUCTION W/ FAT INJECTION N/A 6/10/2020    Procedure: TRANSFER FAT/INJECTION GRAFT;  Surgeon: Ghazala Stewart MD;  [facility];  Service: Plastics    MASTECTOMY Left 10/26/2016    MUSCLE FLAP Left 10/25/2016    Procedure: BREAST WOUND CLOSURE WITH LOCAL FLAP ;  Surgeon: Nayeli Sanchez MD;  [facility];  Service:     NE BREAST RECONSTRUCTION W/FREE FLAP Left 2019    Procedure: BREAST RECONSTRUCTION W/FLAP FREE DEEP INFERIOR EPIGASTRIC  (MARU)/ MESH PLACEMENT;  Surgeon: Ghazala Stewart MD;  [facility];  Service: Plastics    NE INSJ TUNNELED CTR VAD W/SUBQ PORT AGE 5 YR/> Right 2016    Procedure: VENOUS PORT PLACEMENT ;  Surgeon: Elba Powers MD;  [facility];  Service: Surgical Oncology    NE MASTECTOMY, MODIFIED RADICAL Left 10/25/2016    Procedure: MODIFIED RADICAL MASTECTOMY;  Surgeon: Elba Powers MD;  [facility];  Service: Surgical Oncology    NE REVISION OF RECONSTRUCTED BREAST Left 6/10/2020    Procedure: LEFT BREAST MOUND REVISION;  Surgeon: Ally Gan MD;  [facility];  Service: Plastics    WI REVISION OF RECONSTRUCTED BREAST Left 1/27/2021    Procedure: LEFT BREAST MOUND RECONSTRUCTION REVISION;  Surgeon: Ally Gan MD;  [facility];  Service: Plastics    REVISION OF SCAR ON TORSO N/A 6/10/2020    Procedure: SCAR REVISION ABDOMEN;  Surgeon: Ally Gan MD;  [facility];  Service: Plastics        Current Outpatient Medications:     albuterol (PROVENTIL HFA,VENTOLIN HFA) 90 mcg/act inhaler, Inhale 2 puffs every 4 (four) hours as needed for wheezing, Disp: 1 Inhaler, Rfl: 0    amLODIPine (NORVASC) 5 mg tablet, Take 5 mg by mouth daily, Disp: , Rfl:     buPROPion (WELLBUTRIN XL) 150 mg 24 hr tablet, Take 150 mg by mouth every morning , Disp: , Rfl:     lisinopril (ZESTRIL) 40 mg tablet, Take 40 mg by mouth daily, Disp: , Rfl:     meclizine (ANTIVERT) 25 mg tablet, take 1 tablet by mouth three times a day if needed for dizziness, Disp: , Rfl:     metoprolol succinate (TOPROL-XL) 50 mg 24 hr tablet, Take 1 tablet by mouth once daily, Disp: , Rfl:     tamoxifen (NOLVADEX) 20 mg tablet, take 1 tablet by mouth once daily, Disp: 90 tablet, Rfl: 2    zolpidem (AMBIEN CR) 6 25 MG CR tablet, Take 5 mg by mouth daily at bedtime as needed for sleep , Disp: , Rfl:     gabapentin (NEURONTIN) 100 mg capsule, Take 1 capsule (100 mg total) by mouth 3 (three) times a day for 10 days, Disp: 30 capsule, Rfl: 0    senna-docusate sodium (SENOKOT S) 8 6-50 mg per tablet, Take 2 tablets by mouth daily, Disp: , Rfl:   Allergies: Patient has no known allergies  Objective      Incisions have healed well  One protruding suture removed  Fat grafted site with some expected induration  No erythema, warmth      Assessment/Plan   Diagnoses and all orders for this visit:    Status post transverse rectus abdominis muscle (TRAM) flap breast reconstruction    Aquaphor to incision  Monitor for adverse changes  Followup with Dr Natalie Foley in one month      Jake Raya PA-C

## 2021-03-24 ENCOUNTER — OFFICE VISIT (OUTPATIENT)
Dept: PLASTIC SURGERY | Facility: CLINIC | Age: 56
End: 2021-03-24

## 2021-03-24 VITALS — BODY MASS INDEX: 27.62 KG/M2 | WEIGHT: 137 LBS | HEIGHT: 59 IN | TEMPERATURE: 97.1 F

## 2021-03-24 DIAGNOSIS — Z90.12 ACQUIRED ABSENCE OF BREAST AND ABSENT NIPPLE, LEFT: Primary | ICD-10-CM

## 2021-03-24 PROCEDURE — 99024 POSTOP FOLLOW-UP VISIT: CPT | Performed by: PLASTIC SURGERY

## 2021-03-24 NOTE — PROGRESS NOTES
Juan Pablo Banks is 2 months post-op: LEFT BREAST MOUND RECONSTRUCTION REVISION (Left)  Presenting for routine follow-up  S:  Doing well  Patient has noted improve and happy with results  She will like no further revisions at this point  O:  Left breast flap with overall acceptable symmetry and volume  Scars well healed  A:  Satisfactory course  We discussed with the patient that we both felt she has come a long way and has achieved her reconstruction goals given her circumstances  The final stage was discussed with nipple reconstruction  I discussed the procedure in detail as well as risk benefits, before signing an informed consent  P: will schedule her NAC reconstruction

## 2021-05-17 ENCOUNTER — OFFICE VISIT (OUTPATIENT)
Dept: PLASTIC SURGERY | Facility: CLINIC | Age: 56
End: 2021-05-17

## 2021-05-17 DIAGNOSIS — Z98.890 POST-OPERATIVE STATE: Primary | ICD-10-CM

## 2021-05-17 PROCEDURE — 99024 POSTOP FOLLOW-UP VISIT: CPT

## 2021-05-17 NOTE — PROGRESS NOTES
Paresh Lama was seen in the office today for the post op visit following the left nipple reconstruction on 5/10/21  The incision clean, dry and intact  The picture was taken  The dressing was applied( bacitracin, xeroform, nipple donut and gauze  Patient was given the instructions on the daily dressing changes  Pareshmarine Lama is to return to the office in a week for the suture removal  All questions were answered

## 2021-05-24 ENCOUNTER — OFFICE VISIT (OUTPATIENT)
Dept: PLASTIC SURGERY | Facility: CLINIC | Age: 56
End: 2021-05-24

## 2021-05-24 DIAGNOSIS — Z98.890 POST-OPERATIVE STATE: Primary | ICD-10-CM

## 2021-05-24 PROCEDURE — 99024 POSTOP FOLLOW-UP VISIT: CPT

## 2021-05-24 NOTE — PROGRESS NOTES
John Caicedo was seen in the office today for the post op visit following the left nipple  Reconstruction on 5/10/21  The sutures were removed today  The incision healing well  The picture was taken  Patient was given the instructions on the scar care  John Caicedo is to return to our office in 4-6 weeks for the follow up with dr Logan Webb  All questions were answered to patient's satisfaction

## 2021-08-24 DIAGNOSIS — C50.919 MALIGNANT NEOPLASM OF FEMALE BREAST, UNSPECIFIED ESTROGEN RECEPTOR STATUS, UNSPECIFIED LATERALITY, UNSPECIFIED SITE OF BREAST (HCC): ICD-10-CM

## 2021-08-24 RX ORDER — TAMOXIFEN CITRATE 20 MG/1
TABLET ORAL
Qty: 90 TABLET | Refills: 2 | Status: SHIPPED | OUTPATIENT
Start: 2021-08-24

## 2021-09-01 ENCOUNTER — APPOINTMENT (EMERGENCY)
Dept: RADIOLOGY | Facility: HOSPITAL | Age: 56
End: 2021-09-01
Payer: COMMERCIAL

## 2021-09-01 ENCOUNTER — HOSPITAL ENCOUNTER (EMERGENCY)
Facility: HOSPITAL | Age: 56
Discharge: HOME | End: 2021-09-01
Attending: EMERGENCY MEDICINE
Payer: COMMERCIAL

## 2021-09-01 VITALS
TEMPERATURE: 98 F | OXYGEN SATURATION: 99 % | SYSTOLIC BLOOD PRESSURE: 150 MMHG | HEART RATE: 88 BPM | RESPIRATION RATE: 16 BRPM | DIASTOLIC BLOOD PRESSURE: 77 MMHG | WEIGHT: 121.11 LBS | BODY MASS INDEX: 24.46 KG/M2

## 2021-09-01 DIAGNOSIS — F41.9 ANXIETY: Primary | ICD-10-CM

## 2021-09-01 LAB
ALBUMIN SERPL BCP-MCNC: 4.8 G/DL (ref 3–5.2)
ALP SERPL-CCNC: 87 U/L (ref 43–122)
ALT SERPL W P-5'-P-CCNC: 20 U/L
ANION GAP SERPL CALCULATED.3IONS-SCNC: 12 MMOL/L (ref 5–14)
AST SERPL W P-5'-P-CCNC: 34 U/L (ref 14–36)
ATRIAL RATE: 92 BPM
ATRIAL RATE: 94 BPM
BASOPHILS # BLD AUTO: 0.1 THOUSANDS/ΜL (ref 0–0.1)
BASOPHILS NFR BLD AUTO: 1 % (ref 0–1)
BILIRUB SERPL-MCNC: 1.45 MG/DL
BUN SERPL-MCNC: 19 MG/DL (ref 5–25)
CALCIUM SERPL-MCNC: 10.2 MG/DL (ref 8.4–10.2)
CHLORIDE SERPL-SCNC: 105 MMOL/L (ref 97–108)
CO2 SERPL-SCNC: 24 MMOL/L (ref 22–30)
CREAT SERPL-MCNC: 0.73 MG/DL (ref 0.6–1.2)
EOSINOPHIL # BLD AUTO: 0 THOUSAND/ΜL (ref 0–0.4)
EOSINOPHIL NFR BLD AUTO: 0 % (ref 0–6)
ERYTHROCYTE [DISTWIDTH] IN BLOOD BY AUTOMATED COUNT: 14.4 %
GFR SERPL CREATININE-BSD FRML MDRD: 93 ML/MIN/1.73SQ M
GLUCOSE SERPL-MCNC: 154 MG/DL (ref 70–99)
HCT VFR BLD AUTO: 37.2 % (ref 36–46)
HGB BLD-MCNC: 12.8 G/DL (ref 12–16)
LYMPHOCYTES # BLD AUTO: 1.2 THOUSANDS/ΜL (ref 0.5–4)
LYMPHOCYTES NFR BLD AUTO: 11 % (ref 25–45)
MCH RBC QN AUTO: 29.7 PG (ref 26–34)
MCHC RBC AUTO-ENTMCNC: 34.5 G/DL (ref 31–36)
MCV RBC AUTO: 86 FL (ref 80–100)
MONOCYTES # BLD AUTO: 0.7 THOUSAND/ΜL (ref 0.2–0.9)
MONOCYTES NFR BLD AUTO: 7 % (ref 1–10)
NEUTROPHILS # BLD AUTO: 8.6 THOUSANDS/ΜL (ref 1.8–7.8)
NEUTS SEG NFR BLD AUTO: 81 % (ref 45–65)
P AXIS: 35 DEGREES
P AXIS: 37 DEGREES
PLATELET # BLD AUTO: 270 THOUSANDS/UL (ref 150–450)
PMV BLD AUTO: 9.1 FL (ref 8.9–12.7)
POTASSIUM SERPL-SCNC: 3.9 MMOL/L (ref 3.6–5)
PR INTERVAL: 146 MS
PR INTERVAL: 158 MS
PROT SERPL-MCNC: 9 G/DL (ref 5.9–8.4)
QRS AXIS: -32 DEGREES
QRS AXIS: -33 DEGREES
QRSD INTERVAL: 80 MS
QRSD INTERVAL: 86 MS
QT INTERVAL: 352 MS
QT INTERVAL: 360 MS
QTC INTERVAL: 435 MS
QTC INTERVAL: 450 MS
RBC # BLD AUTO: 4.33 MILLION/UL (ref 4–5.2)
SARS-COV-2 RNA RESP QL NAA+PROBE: NEGATIVE
SODIUM SERPL-SCNC: 141 MMOL/L (ref 137–147)
T WAVE AXIS: -9 DEGREES
T WAVE AXIS: 8 DEGREES
TROPONIN I SERPL-MCNC: <0.01 NG/ML (ref 0–0.03)
VENTRICULAR RATE: 92 BPM
VENTRICULAR RATE: 94 BPM
WBC # BLD AUTO: 10.5 THOUSAND/UL (ref 4.5–11)

## 2021-09-01 PROCEDURE — U0005 INFEC AGEN DETEC AMPLI PROBE: HCPCS

## 2021-09-01 PROCEDURE — 99285 EMERGENCY DEPT VISIT HI MDM: CPT

## 2021-09-01 PROCEDURE — U0003 INFECTIOUS AGENT DETECTION BY NUCLEIC ACID (DNA OR RNA); SEVERE ACUTE RESPIRATORY SYNDROME CORONAVIRUS 2 (SARS-COV-2) (CORONAVIRUS DISEASE [COVID-19]), AMPLIFIED PROBE TECHNIQUE, MAKING USE OF HIGH THROUGHPUT TECHNOLOGIES AS DESCRIBED BY CMS-2020-01-R: HCPCS

## 2021-09-01 PROCEDURE — 93010 ELECTROCARDIOGRAM REPORT: CPT

## 2021-09-01 PROCEDURE — 99284 EMERGENCY DEPT VISIT MOD MDM: CPT | Performed by: EMERGENCY MEDICINE

## 2021-09-01 PROCEDURE — 85025 COMPLETE CBC W/AUTO DIFF WBC: CPT

## 2021-09-01 PROCEDURE — 80053 COMPREHEN METABOLIC PANEL: CPT

## 2021-09-01 PROCEDURE — 84484 ASSAY OF TROPONIN QUANT: CPT

## 2021-09-01 PROCEDURE — 93005 ELECTROCARDIOGRAM TRACING: CPT

## 2021-09-01 PROCEDURE — 71045 X-RAY EXAM CHEST 1 VIEW: CPT

## 2021-09-01 PROCEDURE — 36415 COLL VENOUS BLD VENIPUNCTURE: CPT

## 2021-09-01 RX ORDER — ALPRAZOLAM 0.25 MG/1
0.25 TABLET ORAL ONCE
Status: COMPLETED | OUTPATIENT
Start: 2021-09-01 | End: 2021-09-01

## 2021-09-01 RX ADMIN — ALPRAZOLAM 0.25 MG: 0.25 TABLET ORAL at 15:02

## 2021-09-01 NOTE — ED PROVIDER NOTES
History  Chief Complaint   Patient presents with    Weakness - Generalized     for 2 weeks, returned from [facility]  has anxiety that health is going wrong   Shortness of Breath     with back pain    Anxiety     w/ hx, fearful     44-year-old female with PMHx of  Anxiety, depression, peripheral neuropathy and breast cancer (s/p chemo, radiation and mastectomy)  Who presents with 2 weeks of shortness of breath and anxiety related to possibly having COVID-19  Patient was done in [place] at the beginning of last month and tested positive  On 08/06  She recently had a negative COVID test on 08/15/2021  Since then patient has experienced increased dyspnea and generalized malaise  She has not had any genitourinary symptoms and denies any cough or other respiratory symptoms than the dyspnea  History provided by:  Patient   used: Yes    Shortness of Breath  Severity:  Mild  Onset quality:  Gradual  Duration:  2 weeks  Timing:  Intermittent  Progression:  Unchanged  Chronicity:  Recurrent  Context: activity    Context: not URI    Relieved by:  Nothing  Worsened by:  Nothing  Ineffective treatments:  None tried  Associated symptoms: no abdominal pain, no chest pain, no cough, no fever, no headaches, no hemoptysis, no rash, no sore throat and no vomiting    Anxiety  Associated symptoms: no abdominal pain, no chest pain and no headaches        Prior to Admission Medications   Prescriptions Last Dose Informant Patient Reported? Taking?    albuterol (PROVENTIL HFA,VENTOLIN HFA) 90 mcg/act inhaler  Self No No   Sig: Inhale 2 puffs every 4 (four) hours as needed for wheezing   amLODIPine (NORVASC) 5 mg tablet  Self Yes No   Sig: Take 5 mg by mouth daily   buPROPion (WELLBUTRIN XL) 150 mg 24 hr tablet  Self Yes No   Sig: Take 150 mg by mouth every morning    gabapentin (NEURONTIN) 100 mg capsule   No No   Sig: Take 1 capsule (100 mg total) by mouth 3 (three) times a day for 10 days   meclizine (ANTIVERT) 25 mg tablet  Self Yes No   Sig: take 1 tablet by mouth three times a day if needed for dizziness   metoprolol succinate (TOPROL-XL) 50 mg 24 hr tablet  Self Yes No   Sig: Take 1 tablet by mouth once daily   senna-docusate sodium (SENOKOT S) 8 6-50 mg per tablet  Self Yes No   Sig: Take 2 tablets by mouth daily   tamoxifen (NOLVADEX) 20 mg tablet   No No   Sig: take 1 tablet by mouth once daily   zolpidem (AMBIEN CR) 6 25 MG CR tablet  Self Yes No   Sig: Take 5 mg by mouth daily at bedtime as needed for sleep       Facility-Administered Medications: None       Past Medical History:   Diagnosis Date    Anxiety     Asthma     Breast [address] [address] )     left breast chemo only     Depression     History of chemotherapy     Hypertension     Insomnia        Past Surgical History:   Procedure Laterality Date     SECTION      LIPOSUCTION W/ FAT INJECTION N/A 6/10/2020    Procedure: TRANSFER FAT/INJECTION GRAFT;  Surgeon: Esa Buchanan MD;  [facility];  Service: Plastics    MASTECTOMY Left 10/26/2016    MUSCLE FLAP Left 10/25/2016    Procedure: BREAST WOUND CLOSURE WITH LOCAL FLAP ;  Surgeon: Laurie Leblanc MD;  [facility];  Service:     ME BREAST RECONSTRUCTION W/FREE FLAP Left 2019    Procedure: BREAST RECONSTRUCTION W/FLAP FREE DEEP INFERIOR EPIGASTRIC  (MARU)/ MESH PLACEMENT;  Surgeon: Esa Buchanan MD;  [facility];  Service: Plastics    ME INSJ TUNNELED CTR VAD W/SUBQ PORT AGE 5 YR/> Right 2016    Procedure: VENOUS PORT PLACEMENT ;  Surgeon: Shira Reyes MD;  [facility];  Service: Surgical Oncology    ME MASTECTOMY, MODIFIED RADICAL Left 10/25/2016    Procedure: MODIFIED RADICAL MASTECTOMY;  Surgeon: Shira Reyes MD;  [facility];  Service: Surgical Oncology    ME REVISION OF RECONSTRUCTED BREAST Left 6/10/2020    Procedure: LEFT BREAST MOUND REVISION;  Surgeon: Esa Buchanan MD;  [facility];  Service: Plastics    ND REVISION OF RECONSTRUCTED BREAST Left 1/27/2021    Procedure: LEFT BREAST MOUND RECONSTRUCTION REVISION;  Surgeon: Kinza Adams MD;  [facility];  Service: Plastics    REVISION OF SCAR ON TORSO N/A 6/10/2020    Procedure: Juliann Underwood;  Surgeon: Kinza Adams MD;  [facility];  Service: Plastics       Family History   Problem Relation Age of Onset    Hypertension Mother     Hyperlipidemia Mother     Diabetes Mother     Heart disease Mother     Prostate cancer Father     Hypertension Father     Heart disease Brother      I have reviewed and agree with the history as documented  E-Cigarette/Vaping    E-Cigarette Use Never User      E-Cigarette/Vaping Substances     Social History     Tobacco Use    Smoking status: Never Smoker    Smokeless tobacco: Never Used   Vaping Use    Vaping Use: Never used   Substance Use Topics    Alcohol use: No    Drug use: No       Review of Systems   Constitutional: Negative for chills and fever  HENT: Negative for congestion and sore throat  Eyes: Negative for pain and visual disturbance  Respiratory: Positive for shortness of breath  Negative for cough and hemoptysis  Cardiovascular: Negative for chest pain and palpitations  Gastrointestinal: Negative for abdominal pain, diarrhea and vomiting  Genitourinary: Negative for dysuria and hematuria  Musculoskeletal: Negative for arthralgias and back pain  Skin: Negative for rash  Neurological: Positive for weakness  Negative for headaches  All other systems reviewed and are negative  Physical Exam  Physical Exam  Vitals and nursing note reviewed  Constitutional:       General: She is not in acute distress  Appearance: She is well-developed  HENT:      Head: Normocephalic and atraumatic  Eyes:      Extraocular Movements: Extraocular movements intact        Conjunctiva/sclera: Conjunctivae normal  Cardiovascular:      Rate and Rhythm: Normal rate and regular rhythm  Heart sounds: No murmur heard  Pulmonary:      Effort: Pulmonary effort is normal  No respiratory distress  Breath sounds: Normal breath sounds  No decreased breath sounds, wheezing, rhonchi or rales  Abdominal:      Palpations: Abdomen is soft  Tenderness: There is no abdominal tenderness  Musculoskeletal:      Cervical back: Neck supple  Right lower leg: No edema  Left lower leg: No edema  Skin:     General: Skin is warm and dry  Neurological:      Mental Status: She is alert  Psychiatric:         Mood and Affect: Mood is anxious           Vital Signs  ED Triage Vitals [09/01/21 1108]   Temperature Pulse Respirations Blood Pressure SpO2   98 °F (36 7 °C) 94 22 165/97 99 %      Temp Source Heart Rate Source Patient Position - Orthostatic VS BP Location FiO2 (%)   Tympanic Monitor -- -- --      Pain Score       --           Vitals:    09/01/21 1108   BP: 165/97   Pulse: 94         Visual Acuity      ED Medications  Medications - No data to display    Diagnostic Studies  Results Reviewed     None                 No orders to display              Procedures  ECG 12 Lead Documentation Only    Date/Time: 9/1/2021 12:22 PM  Performed by: Stefania Mandujano DO  Authorized by: Stefania Mandujano DO     ECG reviewed by me, the ED Provider: yes    Patient location:  ED  Previous ECG:     Previous ECG:  Unavailable    Comparison to cardiac monitor: No    Interpretation:     Interpretation: normal    Quality:     Tracing quality:  Limited by artifact  Rate:     ECG rate assessment: normal    Rhythm:     Rhythm: sinus rhythm    Ectopy:     Ectopy: none    QRS:     QRS axis:  Normal  ST segments:     ST segments:  Normal  T waves:     T waves: normal    Comments:      LAD read by computer however unlikely as artifact limits interpretation             ED Course  ED Course as of Sep 01 1432   Wed Sep 01, 2021   1353 XR chest 1 view portable   1420  Had an extensive conversation with the patient regarding her negative lab work,  her negative COVID test and her negative chest x-ray  Patient continued to remain extremely anxious as noted by fast speech, pacing and emotional lability  Attempted multiple times to reassure the patient regarding her medical workup and found that she was very sad at home with no identifiable cause  She denied any suicidal ideations and attested  to being lonely  Emphasized the importance of a social network for the patient to connect to and follow-up with her primary care physician  Will also offer the patient a list of therapists that she can refer to when needed  MDM  Number of Diagnoses or Management Options  Anxiety  Diagnosis management comments:   Presentation, symptoms, and overall clinical picture raises concern for URI, COVID-19, pneumonia  Will obtain chest x-ray to further evaluate  Given age,  PERC rule cannot apply to patient however  Do not suspect PE  Low threshold to obtain CT chest imaging to further evaluate  There is also a significant anxiety component and therefore will administer low-dose the next outpatient relax  Amount and/or Complexity of Data Reviewed  Clinical lab tests: ordered and reviewed  Tests in the radiology section of CPT®: ordered and reviewed  Review and summarize past medical records: yes    Risk of Complications, Morbidity, and/or Mortality  Presenting problems: moderate  Diagnostic procedures: minimal  Management options: moderate    Patient Progress  Patient progress: stable      Disposition  Final diagnoses:   None     ED Disposition     None      Follow-up Information    None         Patient's Medications   Discharge Prescriptions    No medications on file     No discharge procedures on file      PDMP Review       Value Time User    PDMP Reviewed  Yes 1/27/2021 12:58 PM Esperanza Stokes PA-C          ED Provider  Electronically Signed by           Wally Wilson DO  09/01/21 2001

## 2021-09-02 ENCOUNTER — HOSPITAL ENCOUNTER (EMERGENCY)
Facility: HOSPITAL | Age: 56
Discharge: HOME | End: 2021-09-02
Attending: EMERGENCY MEDICINE
Payer: COMMERCIAL

## 2021-09-02 VITALS
BODY MASS INDEX: 24.62 KG/M2 | OXYGEN SATURATION: 99 % | RESPIRATION RATE: 22 BRPM | DIASTOLIC BLOOD PRESSURE: 94 MMHG | WEIGHT: 121.9 LBS | HEART RATE: 86 BPM | SYSTOLIC BLOOD PRESSURE: 167 MMHG | TEMPERATURE: 97.2 F

## 2021-09-02 DIAGNOSIS — F41.9 ANXIETY: Primary | ICD-10-CM

## 2021-09-02 PROCEDURE — 99283 EMERGENCY DEPT VISIT LOW MDM: CPT

## 2021-09-02 PROCEDURE — 99284 EMERGENCY DEPT VISIT MOD MDM: CPT | Performed by: EMERGENCY MEDICINE

## 2021-09-02 RX ORDER — LORAZEPAM 1 MG/1
1 TABLET ORAL ONCE
Status: COMPLETED | OUTPATIENT
Start: 2021-09-02 | End: 2021-09-02

## 2021-09-02 RX ORDER — HYDROXYZINE HYDROCHLORIDE 25 MG/1
25 TABLET, FILM COATED ORAL EVERY 6 HOURS PRN
Qty: 20 TABLET | Refills: 0 | Status: SHIPPED | OUTPATIENT
Start: 2021-09-02

## 2021-09-02 RX ADMIN — LORAZEPAM 1 MG: 1 TABLET ORAL at 22:28

## 2021-09-03 NOTE — ED PROVIDER NOTES
History  Chief Complaint   Patient presents with    Panic Attack     returned from yesterday high anxiety     Patient is a 71-year-old female with a history of anxiety who presents with continued anxiety  Seen yesterday here for the same  Had been off meds, restarted meds today but no improved  Had full medical work up yesterday  Prior to Admission Medications   Prescriptions Last Dose Informant Patient Reported? Taking?    albuterol (PROVENTIL HFA,VENTOLIN HFA) 90 mcg/act inhaler  Self No No   Sig: Inhale 2 puffs every 4 (four) hours as needed for wheezing   amLODIPine (NORVASC) 5 mg tablet  Self Yes No   Sig: Take 5 mg by mouth daily   buPROPion (WELLBUTRIN XL) 150 mg 24 hr tablet  Self Yes No   Sig: Take 150 mg by mouth every morning    gabapentin (NEURONTIN) 100 mg capsule   No No   Sig: Take 1 capsule (100 mg total) by mouth 3 (three) times a day for 10 days   meclizine (ANTIVERT) 25 mg tablet  Self Yes No   Sig: take 1 tablet by mouth three times a day if needed for dizziness   metoprolol succinate (TOPROL-XL) 50 mg 24 hr tablet  Self Yes No   Sig: Take 1 tablet by mouth once daily   senna-docusate sodium (SENOKOT S) 8 6-50 mg per tablet  Self Yes No   Sig: Take 2 tablets by mouth daily   tamoxifen (NOLVADEX) 20 mg tablet   No No   Sig: take 1 tablet by mouth once daily   zolpidem (AMBIEN CR) 6 25 MG CR tablet  Self Yes No   Sig: Take 5 mg by mouth daily at bedtime as needed for sleep       Facility-Administered Medications: None       Past Medical History:   Diagnosis Date    Anxiety     Asthma     Breast cancer ([facility] [address] )     left breast chemo only     Depression     History of chemotherapy     Hypertension     Insomnia        Past Surgical History:   Procedure Laterality Date     SECTION      LIPOSUCTION W/ FAT INJECTION N/A 6/10/2020    Procedure: TRANSFER FAT/INJECTION GRAFT;  Surgeon: Sherita Kaplan MD;  [facility];  Service: Plastics    MASTECTOMY Left 10/26/2016    MUSCLE FLAP Left 10/25/2016    Procedure: BREAST WOUND CLOSURE WITH LOCAL FLAP ;  Surgeon: Laurie Leblanc MD;  [facility];  Service:     NE BREAST RECONSTRUCTION W/FREE FLAP Left 9/5/2019    Procedure: BREAST RECONSTRUCTION W/FLAP FREE DEEP INFERIOR EPIGASTRIC  (MARU)/ MESH PLACEMENT;  Surgeon: Esa Buchanan MD;  [facility];  Service: Plastics    NE INSJ TUNNELED CTR VAD W/SUBQ PORT AGE 5 YR/> Right 4/26/2016    Procedure: VENOUS PORT PLACEMENT ;  Surgeon: Shira Reyes MD;  [facility];  Service: Surgical Oncology    NE MASTECTOMY, MODIFIED RADICAL Left 10/25/2016    Procedure: MODIFIED RADICAL MASTECTOMY;  Surgeon: Shira Reyes MD;  [facility];  Service: Surgical Oncology    NE REVISION OF RECONSTRUCTED BREAST Left 6/10/2020    Procedure: LEFT BREAST MOUND REVISION;  Surgeon: Esa Buchanan MD;  [facility];  Service: Plastics    NE REVISION OF RECONSTRUCTED BREAST Left 1/27/2021    Procedure: LEFT BREAST MOUND RECONSTRUCTION REVISION;  Surgeon: Esa Buchanan MD;  [facility];  Service: Plastics    REVISION OF SCAR ON TORSO N/A 6/10/2020    Procedure: Andrea Glidden;  Surgeon: Esa Buchanan MD;  [facility];  Service: Plastics       Family History   Problem Relation Age of Onset    Hypertension Mother     Hyperlipidemia Mother     Diabetes Mother     Heart disease Mother     Prostate cancer Father     Hypertension Father     Heart disease Brother      I have reviewed and agree with the history as documented      E-Cigarette/Vaping    E-Cigarette Use Never User      E-Cigarette/Vaping Substances     Social History     Tobacco Use    Smoking status: Never Smoker    Smokeless tobacco: Never Used   Vaping Use    Vaping Use: Never used   Substance Use Topics    Alcohol use: No    Drug use: No       Review of Systems   Constitutional: Positive for appetite change  Negative for activity change  HENT: Negative  Eyes: Negative  Respiratory: Positive for shortness of breath  Cardiovascular: Positive for palpitations  Gastrointestinal: Negative  Endocrine: Negative  Genitourinary: Negative  Musculoskeletal: Negative  Skin: Negative  Allergic/Immunologic: Negative  Neurological: Negative  Hematological: Negative  Psychiatric/Behavioral: The patient is nervous/anxious  All other systems reviewed and are negative  Physical Exam  Physical Exam  Constitutional:       General: She is in acute distress  Appearance: Normal appearance  She is normal weight  HENT:      Head: Normocephalic and atraumatic  Nose: Nose normal       Mouth/Throat:      Mouth: Mucous membranes are moist       Pharynx: Oropharynx is clear  Cardiovascular:      Rate and Rhythm: Normal rate and regular rhythm  Pulses: Normal pulses  Heart sounds: Normal heart sounds  Pulmonary:      Effort: Pulmonary effort is normal       Breath sounds: Normal breath sounds  Abdominal:      General: Bowel sounds are normal       Palpations: Abdomen is soft  Musculoskeletal:         General: Normal range of motion  Cervical back: Normal range of motion and neck supple  Skin:     General: Skin is warm and dry  Neurological:      General: No focal deficit present  Mental Status: She is alert and oriented to person, place, and time  Psychiatric:         Mood and Affect: Mood is anxious  Behavior: Behavior is agitated           Vital Signs  ED Triage Vitals [09/02/21 2225]   Temperature Pulse Respirations Blood Pressure SpO2   (!) 97 2 °F (36 2 °C) 86 22 167/94 99 %      Temp Source Heart Rate Source Patient Position - Orthostatic VS BP Location FiO2 (%)   Tympanic Monitor -- -- --      Pain Score       --           Vitals:    09/02/21 2225   BP: 167/94   Pulse: 86         Visual Acuity      ED Medications  Medications   LORazepam (ATIVAN) tablet 1 mg (1 mg Oral Given 9/2/21 2228)       Diagnostic Studies  Results Reviewed     None                 No orders to display              Procedures  Procedures         ED Course                             SBIRT 20yo+      Most Recent Value   SBIRT (23 yo +)   In order to provide better care to our patients, we are screening all of our patients for alcohol and drug use  Would it be okay to ask you these screening questions? Yes Filed at: 09/02/2021 2230   Initial Alcohol Screen: US AUDIT-C    1  How often do you have a drink containing alcohol?  0 Filed at: 09/02/2021 2230   2  How many drinks containing alcohol do you have on a typical day you are drinking? 0 Filed at: 09/02/2021 2230   3b  FEMALE Any Age, or MALE 65+: How often do you have 4 or more drinks on one occassion? 0 Filed at: 09/02/2021 2230   Audit-C Score  0 Filed at: 09/02/2021 2230   ALICE: How many times in the past year have you    Used an illegal drug or used a prescription medication for non-medical reasons? Never Filed at: 09/02/2021 2230                    MDM    Disposition  Final diagnoses:   None     ED Disposition     None      Follow-up Information    None         Patient's Medications   Discharge Prescriptions    No medications on file     No discharge procedures on file      PDMP Review       Value Time User    PDMP Reviewed  Yes 1/27/2021 12:58 PM Niko Larios PA-C          ED Provider  Electronically Signed by           Juani Tinajero MD  09/02/21 2236

## 2021-12-02 NOTE — PRE-PROCEDURE INSTRUCTIONS
-- DO NOT REPLY / DO NOT REPLY ALL --  -- Message is from the [facility]--    General Patient Message      Reason for Call: patient calling second time to check status of referral to phycologist. Patient would like a call back.      Caller Information       Type Contact Phone    12/01/2021 02:03 PM CST Phone (Incoming) Nicole Dutta (Self) [phone] (H)          Alternative phone number: none         Did the caller agree that this message can wait until the office reopens in the morning? YES - The Message Can Wait      Send a message to the provider’s clinical support pool.     Turnaround time given to caller:   \"This message will be sent to [state Provider's name]. The clinical team will fulfill your request as soon as they review your message when the office opens tomorrow.\"         Pre-Surgery Instructions:   Medication Instructions    albuterol (PROVENTIL HFA,VENTOLIN HFA) 90 mcg/act inhaler Instructed patient per Anesthesia Guidelines   amLODIPine (NORVASC) 5 mg tablet Instructed patient per Anesthesia Guidelines   buPROPion (WELLBUTRIN XL) 150 mg 24 hr tablet Instructed patient per Anesthesia Guidelines   gabapentin (NEURONTIN) 100 mg capsule Instructed patient per Anesthesia Guidelines   lisinopril (ZESTRIL) 40 mg tablet Instructed patient per Anesthesia Guidelines   lisinopril-hydrochlorothiazide (PRINZIDE,ZESTORETIC) 20-25 MG per tablet Instructed patient per Anesthesia Guidelines   meclizine (ANTIVERT) 25 mg tablet Instructed patient per Anesthesia Guidelines   metoprolol succinate (TOPROL-XL) 50 mg 24 hr tablet Instructed patient per Anesthesia Guidelines   senna-docusate sodium (SENOKOT S) 8 6-50 mg per tablet Instructed patient per Anesthesia Guidelines   tamoxifen (NOLVADEX) 20 mg tablet Instructed patient per Anesthesia Guidelines   zolpidem (AMBIEN CR) 6 25 MG CR tablet Instructed patient per Anesthesia Guidelines  Spoke with patients ANITRA Everett who takes care of patients call and appointments  Medication list reviewed  Told to hold lisinipril and lisinopril hydrchlorothiazide DOS  Told not to take any NSADIS or vitamins 1 week prior to surgery  Ok to take morning meds with sip of water DOS  Npo after midnight  Shower instructions and 1 adult visitor policy understood and the  Need for a ride home after surgery   Denies covid symtpoms

## 2022-07-20 ENCOUNTER — OFFICE VISIT (OUTPATIENT)
Dept: DENTISTRY | Facility: CLINIC | Age: 57
End: 2022-07-20

## 2022-07-20 VITALS — TEMPERATURE: 99.1 F | HEART RATE: 50 BPM | DIASTOLIC BLOOD PRESSURE: 78 MMHG | SYSTOLIC BLOOD PRESSURE: 144 MMHG

## 2022-07-20 DIAGNOSIS — Z01.20 ENCOUNTER FOR DENTAL EXAMINATION: Primary | ICD-10-CM

## 2022-07-20 PROCEDURE — D0210 INTRAORAL - COMPLETE SERIES OF RADIOGRAPHIC IMAGES: HCPCS

## 2022-07-20 PROCEDURE — D0150 COMPREHENSIVE ORAL EVALUATION - NEW OR ESTABLISHED PATIENT: HCPCS | Performed by: DENTIST

## 2022-07-20 NOTE — PROGRESS NOTES
Comprehensive Exam    Cortney Mcdonough presents for a comprehensive exam  Verbal consent for treatment given in addition to the forms  Reviewed health history - Patient is ASA Class II  Consents signed: Yes    Perio: Generalized and Slight bleeding  Pain Scale: 0  Caries Assessment: low  Radiographs:  FMX  Completed Perio chart 5mm + posterior pockets with many areas of recession recommend 4 Quad SRP's Class I mobility #18 & #30 from occlusal trauma prognosis not good eventually patient will need a upper and lower partials  : [id number]    Oral Hygiene instruction reviewed and given  Hygiene recall visits recommended to the patient  Treatment Plan:  1  Infection control  2  Periodontal therapy: Periodontitis   3  Caries control:Low   4  Occlusal evaluation    Prognosis is Good    Referrals needed: No  NV: 1 SRP UL &  min ( pre-auth)   NV: 2 SRP UR & min ( pre- auth)   NV: 3 4-6 week post op     Exam by Dr Ras Stewart

## 2023-01-12 ENCOUNTER — OFFICE VISIT (OUTPATIENT)
Dept: DENTISTRY | Facility: CLINIC | Age: 58
End: 2023-01-12

## 2023-01-12 VITALS — TEMPERATURE: 98.4 F | DIASTOLIC BLOOD PRESSURE: 98 MMHG | HEART RATE: 98 BPM | SYSTOLIC BLOOD PRESSURE: 164 MMHG

## 2023-01-12 DIAGNOSIS — Z01.20 ENCOUNTER FOR DENTAL EXAMINATION: Primary | ICD-10-CM

## 2023-01-12 NOTE — PROGRESS NOTES
SCALING AND ROOT PLANING     ASA:II  Pain:0  Reviewed M/H BP elevated 1st time 164/98 ASHER, 182/93 ankle ( had mastectomy) waited 15 min retake 167/88 ASHER     After discussing blood pressure with patient, she informed me she hasn't taken it for a week, but she took it today, I recommend she follow up with her PCP ( she has appointment 1/25/2023 with PCP) she also informed me she is nervous since she found out her brother has been hospitalized  SC/RP:   UR/LR   Quad scaling and root planning  Applied Topical Anesthetic,   Administered  2 carpule , Long  Needle,  Septocaine (articaine HCI ) and Epi 4 % and  1:100,000 1 7 mL, Infiltration, IANB   Type of Treatment:  Used Ultrasonic and Hand Scaling,  Subgingival Irrigation - post tx - Chlorhexidine  Reviewed OHI  - Brush 2X/day and Floss 1X/day    Oral Hygiene: Fair  Plaque: Moderate   Calculus:  Light/Moderate   Bleeding: Moderate   Stain:  None     Treatment Plan:  no changes to tx plan       Dr Neo Friedman   gave anesthesia today                 Referral:  No referral given  Gave follow-up directions      NV1:  SC/RP - UR/ LR  - 75 min  NV2:  post SRP eval - 45 min  NV3:  3 month perio maintenance - 60 min    Patient sat for 10 min following SRP ret-take /106

## 2023-01-18 ENCOUNTER — OFFICE VISIT (OUTPATIENT)
Dept: DENTISTRY | Facility: CLINIC | Age: 58
End: 2023-01-18

## 2023-01-18 VITALS — SYSTOLIC BLOOD PRESSURE: 138 MMHG | TEMPERATURE: 98.1 F | HEART RATE: 101 BPM | DIASTOLIC BLOOD PRESSURE: 75 MMHG

## 2023-01-18 DIAGNOSIS — Z01.20 ENCOUNTER FOR DENTAL EXAMINATION: Primary | ICD-10-CM

## 2023-01-18 NOTE — PROGRESS NOTES
SCALING AND ROOT PLANING     ASA:II  Pain: 0  Reviewed M/H    SC/RP:   UR/LR Quad scaling and root planning  Applied Topical Anesthetic,   Administered 2 carpules, Short  Needle, Lidocaine HCL 2 % and Epi 1 7 mL 1:100,000,   1 carpule , Short Needle,  Septocaine (articaine HCI ) and Epi 4 % and  1:100,000 1 7 mL, Infiltration, IANB   Type of Treatment:  Used Ultrasonic and Hand Scaling, Subgingival Irrigation - post tx - Chlorhexidine  Reviewed OHI  - Brush 2X/day and Floss 1X/day    Oral Hygiene:  Good   Plaque:  Light   Calculus:  Light /moderate   Bleeding: Moderate    Stain:  None     Treatment Plan:  no changes to tx plan       Dr Dianna Marrero: Kelli Acevedo   gave anesthesia today                 Referral:  No referral given  Gave follow-up directions    NV1:   post SRP eval - 45 min  NV2:  3 month perio maintenance - 60 min

## 2023-09-27 ENCOUNTER — OFFICE VISIT (OUTPATIENT)
Dept: DENTISTRY | Facility: CLINIC | Age: 58
End: 2023-09-27

## 2023-09-27 VITALS — SYSTOLIC BLOOD PRESSURE: 162 MMHG | HEART RATE: 125 BPM | DIASTOLIC BLOOD PRESSURE: 117 MMHG | TEMPERATURE: 98.2 F

## 2023-09-27 DIAGNOSIS — K05.6 PERIODONTAL DISEASE: ICD-10-CM

## 2023-09-27 DIAGNOSIS — Z59.82 INABILITY TO ACQUIRE TRANSPORTATION: ICD-10-CM

## 2023-09-27 DIAGNOSIS — Z01.20 ENCOUNTER FOR DENTAL EXAMINATION: Primary | ICD-10-CM

## 2023-09-27 PROCEDURE — D0220 INTRAORAL - PERIAPICAL FIRST RADIOGRAPHIC IMAGE: HCPCS

## 2023-09-27 PROCEDURE — D0274 BITEWINGS - 4 RADIOGRAPHIC IMAGES: HCPCS

## 2023-09-27 PROCEDURE — D0230 INTRAORAL - PERIAPICAL EACH ADDITIONAL RADIOGRAPHIC IMAGE: HCPCS

## 2023-09-27 PROCEDURE — D0120 PERIODIC ORAL EVALUATION - ESTABLISHED PATIENT: HCPCS | Performed by: DENTIST

## 2023-09-27 SDOH — ECONOMIC STABILITY - TRANSPORTATION SECURITY: TRANSPORTATION INSECURITY: Z59.82

## 2023-09-27 NOTE — DENTAL PROCEDURE DETAILS
Crystal Youngblood presents for a Periodic exam. Verbal consent for treatment given in addition to the forms. TRANSLATION USED ([id number])     Reviewed health history - Patient is ASA III  BP NOT CONTROLLED !!!!    SHE HAS NOT BEEN TAKING BP MEDS   states she has an appt with MD to get renewed    States she is only taking 4 anxiety meds now   BP was  162/117  10 min later 163/116     Stressed how  this is dangerously high   she claims it is also due to her anxiety    Consents signed: Yes     Perio: Recession  bone loss noted   stage 2-3 grade A  occlusal trauma noted  Pain Scale: 0  Caries Assessment: Medium  Radiographs: Bitewings x4  anterior Genetta Jean-Paul Collazo approved limited service today  stressed seeing PCP      Oral Hygiene instruction reviewed and given. Recommended 3-4 mos Hygiene perio maintenance  visits with  David Conrad. Her SRPs were completed 1/2023     Treatment Plan:  1. Infection control:     2. Periodontal therapy: perio maintenance  3. Caries control: as charted  4. Occlusal evaluation:  poor prognosis # 32     Prognosis is guarded  Referrals needed: No  Next Visit:     Perio maint.  After insurance approval

## 2023-10-24 ENCOUNTER — APPOINTMENT (OUTPATIENT)
Dept: LAB | Facility: HOSPITAL | Age: 58
End: 2023-10-24
Payer: MEDICARE

## 2023-10-24 DIAGNOSIS — F33.1 MAJOR DEPRESSIVE DISORDER, RECURRENT EPISODE, MODERATE (HCC): ICD-10-CM

## 2023-10-24 LAB
ALBUMIN SERPL BCP-MCNC: 4.2 G/DL (ref 3.5–5)
ALP SERPL-CCNC: 66 U/L (ref 34–104)
ALT SERPL W P-5'-P-CCNC: 19 U/L (ref 7–52)
ANION GAP SERPL CALCULATED.3IONS-SCNC: 12 MMOL/L
AST SERPL W P-5'-P-CCNC: 19 U/L (ref 13–39)
BASOPHILS # BLD AUTO: 0.03 THOUSANDS/ÂΜL (ref 0–0.1)
BASOPHILS NFR BLD AUTO: 0 % (ref 0–1)
BILIRUB SERPL-MCNC: 0.48 MG/DL (ref 0.2–1)
BUN SERPL-MCNC: 17 MG/DL (ref 5–25)
CALCIUM SERPL-MCNC: 9.3 MG/DL (ref 8.4–10.2)
CHLORIDE SERPL-SCNC: 102 MMOL/L (ref 96–108)
CHOLEST SERPL-MCNC: 254 MG/DL
CO2 SERPL-SCNC: 25 MMOL/L (ref 21–32)
CREAT SERPL-MCNC: 0.83 MG/DL (ref 0.6–1.3)
EOSINOPHIL # BLD AUTO: 0.27 THOUSAND/ÂΜL (ref 0–0.61)
EOSINOPHIL NFR BLD AUTO: 3 % (ref 0–6)
ERYTHROCYTE [DISTWIDTH] IN BLOOD BY AUTOMATED COUNT: 13.6 % (ref 11.6–15.1)
GFR SERPL CREATININE-BSD FRML MDRD: 78 ML/MIN/1.73SQ M
GLUCOSE P FAST SERPL-MCNC: 100 MG/DL (ref 65–99)
HCT VFR BLD AUTO: 38.4 % (ref 34.8–46.1)
HDLC SERPL-MCNC: 47 MG/DL
HGB BLD-MCNC: 12.1 G/DL (ref 11.5–15.4)
IMM GRANULOCYTES # BLD AUTO: 0.02 THOUSAND/UL (ref 0–0.2)
IMM GRANULOCYTES NFR BLD AUTO: 0 % (ref 0–2)
LDLC SERPL CALC-MCNC: 139 MG/DL (ref 0–100)
LYMPHOCYTES # BLD AUTO: 3.02 THOUSANDS/ÂΜL (ref 0.6–4.47)
LYMPHOCYTES NFR BLD AUTO: 38 % (ref 14–44)
MCH RBC QN AUTO: 27.3 PG (ref 26.8–34.3)
MCHC RBC AUTO-ENTMCNC: 31.5 G/DL (ref 31.4–37.4)
MCV RBC AUTO: 87 FL (ref 82–98)
MONOCYTES # BLD AUTO: 0.73 THOUSAND/ÂΜL (ref 0.17–1.22)
MONOCYTES NFR BLD AUTO: 9 % (ref 4–12)
NEUTROPHILS # BLD AUTO: 3.87 THOUSANDS/ÂΜL (ref 1.85–7.62)
NEUTS SEG NFR BLD AUTO: 50 % (ref 43–75)
NONHDLC SERPL-MCNC: 207 MG/DL
NRBC BLD AUTO-RTO: 0 /100 WBCS
PLATELET # BLD AUTO: 304 THOUSANDS/UL (ref 149–390)
PMV BLD AUTO: 10.8 FL (ref 8.9–12.7)
POTASSIUM SERPL-SCNC: 3.3 MMOL/L (ref 3.5–5.3)
PROT SERPL-MCNC: 7.2 G/DL (ref 6.4–8.4)
RBC # BLD AUTO: 4.43 MILLION/UL (ref 3.81–5.12)
SODIUM SERPL-SCNC: 139 MMOL/L (ref 135–147)
TRIGL SERPL-MCNC: 340 MG/DL
TSH SERPL DL<=0.05 MIU/L-ACNC: 21.07 UIU/ML (ref 0.45–4.5)
WBC # BLD AUTO: 7.94 THOUSAND/UL (ref 4.31–10.16)

## 2023-10-24 PROCEDURE — 80053 COMPREHEN METABOLIC PANEL: CPT

## 2023-10-24 PROCEDURE — 80061 LIPID PANEL: CPT

## 2023-10-24 PROCEDURE — 36415 COLL VENOUS BLD VENIPUNCTURE: CPT

## 2023-10-24 PROCEDURE — 85025 COMPLETE CBC W/AUTO DIFF WBC: CPT

## 2023-10-24 PROCEDURE — 84443 ASSAY THYROID STIM HORMONE: CPT

## 2023-11-01 ENCOUNTER — PATIENT OUTREACH (OUTPATIENT)
Dept: DENTISTRY | Facility: CLINIC | Age: 58
End: 2023-11-01

## 2023-11-01 NOTE — PROGRESS NOTES
OP SWCM referral received 9/27 from Dr. Oralia Cha, Juliane Quiroz regarding pt [facility] needs for transportation needs. Chart review completed. Per chart review, pt was seen for a periodic dental exam. Per chart, pt completed [facility] assessment and was at risk for inadequate transportation. Per chart, pt reports she needs uber rides to get to appts that she cannot walk to. Pt is Belgian speaking. OP SWCM used [employer]  #[id number] to call pt. OP SWCM had to leave  for pt asking for call back to offer assistance with transportation.

## 2024-02-06 ENCOUNTER — PATIENT OUTREACH (OUTPATIENT)
Dept: DENTISTRY | Facility: CLINIC | Age: 59
End: 2024-02-06

## 2024-02-06 NOTE — LETTER
02/06/24    Estimado/a Reynaldo Remy un coordinador de la atención médica en  [facility]  [facility]  [address] [address] [phone]. Intentamos comunicarnos con usted por teléfono varias veces. Es importante que se comunique con nosotros al [phone] para que podamos ofrecerle ayuda con rios necesidades de atención médica.     Atentamente.     ANTELMO Gracia

## 2024-02-06 NOTE — PROGRESS NOTES
Chart review completed. 2nd outreach attempt for pt [facility] needs regarding transportation. Per chart, pt is Turkish speaking. OP SWCM called pt with  services. OP SWCM received pt's VM and had to leave a VM asking for a call back to assist with [facility] needs.    UTR letter to be mailed. Pt does have upcoming dental appt. Provider to re-refer for SW if needs arise.

## 2024-02-21 PROBLEM — J18.9 PNEUMONIA: Status: RESOLVED | Noted: 2017-01-17 | Resolved: 2024-02-21

## 2024-04-16 ENCOUNTER — OFFICE VISIT (OUTPATIENT)
Dept: DENTISTRY | Facility: CLINIC | Age: 59
End: 2024-04-16

## 2024-04-16 VITALS — DIASTOLIC BLOOD PRESSURE: 81 MMHG | HEART RATE: 91 BPM | SYSTOLIC BLOOD PRESSURE: 150 MMHG

## 2024-04-16 DIAGNOSIS — Z01.20 ENCOUNTER FOR DENTAL EXAMINATION: ICD-10-CM

## 2024-04-16 DIAGNOSIS — K05.6 PERIODONTAL DISEASE: ICD-10-CM

## 2024-04-16 PROCEDURE — D4910 PERIODONTAL MAINTENANCE: HCPCS

## 2024-04-16 RX ORDER — ATORVASTATIN CALCIUM 40 MG/1
1 TABLET, FILM COATED ORAL EVERY EVENING
COMMUNITY
Start: 2023-12-07

## 2024-04-16 RX ORDER — LORAZEPAM 0.5 MG/1
0.5 TABLET ORAL EVERY 6 HOURS PRN
COMMUNITY

## 2024-04-16 RX ORDER — LEVOTHYROXINE SODIUM 0.05 MG/1
50 TABLET ORAL
COMMUNITY
Start: 2024-03-13 | End: 2025-03-13

## 2024-04-16 NOTE — PROGRESS NOTES
PERIODONTAL MAINTENANCE     Reviewed medical history   ASA  2   BP better controlled   states her thyroid levels are up  Lymphoedema noted L arm/hand    BP taken on R arm    Periodontal therapy includes removal of bacterial plaque and calculus from supragingival and subgingival regions.  Site specific scaling and root planning where indicated, and polishing the teeth.    Ultrasonic and hand scaled   residual sub calc noted   patient tolerated procedure well   This was first PERMA since SRPs were completed ( last visit BP was too high and only exam BWX completed)  Periodontal therapy recommendation to continue 3 months intervals.   Exam - next PERMA    PDIGC

## 2024-07-17 ENCOUNTER — OFFICE VISIT (OUTPATIENT)
Dept: DENTISTRY | Facility: CLINIC | Age: 59
End: 2024-07-17

## 2024-07-17 VITALS — TEMPERATURE: 98.7 F | HEART RATE: 114 BPM | DIASTOLIC BLOOD PRESSURE: 78 MMHG | SYSTOLIC BLOOD PRESSURE: 145 MMHG

## 2024-07-17 DIAGNOSIS — K03.6 ACCRETIONS ON TEETH: ICD-10-CM

## 2024-07-17 DIAGNOSIS — K03.6 DENTAL CALCULUS: ICD-10-CM

## 2024-07-17 DIAGNOSIS — K05.6 PERIODONTAL DISEASE: Primary | ICD-10-CM

## 2024-07-17 PROCEDURE — D0120 PERIODIC ORAL EVALUATION - ESTABLISHED PATIENT: HCPCS

## 2024-07-17 PROCEDURE — D4910 PERIODONTAL MAINTENANCE: HCPCS

## 2024-07-17 NOTE — DENTAL PROCEDURE DETAILS
PERIODIC EXAM, ADULT PROPHY , (no xrays due)   REVIEWED MED HX: meds, allergies, health changes reviewed in [facility]. All consents signed.  CHIEF CONCERN: open contact UL  # 15-16 area  PAIN SCALE:  0  ASA CLASS:  ASA 2 - Patient with mild systemic disease with no functional limitations  PLAQUE:  mild  CALCULUS: Light  BLEEDING:  none  STAIN : None     PERIO: stage 2 grade B   improvement noted in home care    Hygiene Procedures:  Scaled, Polished, Flossed and Used Cavitron    Oral Hygiene Instruction: Brushing minimum 2x daily for 2 minutes, daily flossing, OTC Fluoride rinse, and Recommended soft toothbrush only    Dispensed: Toothbrush, Toothpaste, Floss, and Flossers    Visual and Tactile Intraoral/ Extraoral evaluation: Oral and Oropharyngeal cancer evaluation. No findings     Dr. NAVARRO Reviewed with patient clinical and radiographic findings and patient verbalized understanding. All questions and concerns addressed.   TRANSLATION USED FOR EXAM  [id number]    REFERRALS: None    CARIES FINDINGS:   abfractions noted      She has slight open contact between # 15-16  advised adjunctive aids to cleanse IP area vs attempting to restore to close open contact  DR Navarro explained the tooth may continue to drift  Patient agreed      showed her soft picks and gave floss and Glide flossers       TREATMENT  PLAN :   NV:   dwight - abfractions LL    Next Recall: 4 month perio maint.

## 2024-08-26 ENCOUNTER — OFFICE VISIT (OUTPATIENT)
Dept: DENTISTRY | Facility: CLINIC | Age: 59
End: 2024-08-26

## 2024-08-26 VITALS — DIASTOLIC BLOOD PRESSURE: 87 MMHG | HEART RATE: 109 BPM | SYSTOLIC BLOOD PRESSURE: 154 MMHG | TEMPERATURE: 97.7 F

## 2024-08-26 DIAGNOSIS — Z01.20 ENCOUNTER FOR DENTAL EXAMINATION: Primary | ICD-10-CM

## 2024-08-26 PROCEDURE — D2391 RESIN-BASED COMPOSITE - 1 SURFACE, POSTERIOR: HCPCS

## 2024-08-26 NOTE — PROGRESS NOTES
Composite Filling    Joanna ChuyitaCristobalaaron presents for composite filling. PMH reviewed, no changes.    #28 B, 29 B abfraction lesions, no drilling needed, no anesthesia needed, patient comfortable throughout procedure.    Prepped tooth #28 B, 29 B with prophy brush and pumice.  Isolation with cotton rolls     Etch with 37% H2PO4, rinse, dry. Gluma scrubbed into prep, dried, rinsed and dried. Applied Adhese with 20 second scrub once, gentle air dry and light cured for 10s. Restored with Tetric bulk margoth shade A3 and light cured.    Refined with finishing burs, polished with enhance point. Verified occlusion and contacts. Pt left satisfied.    NV: #16 MO slot prep (to restore contact with #15)   Quality 110: Preventive Care And Screening: Influenza Immunization: Influenza Immunization Administered during Influenza season Quality 111:Pneumonia Vaccination Status For Older Adults: Pneumococcal Vaccination Previously Received Detail Level: Detailed Quality 137: Melanoma: Continuity Of Care - Recall System: Patient information entered into a recall system that includes: target date for the next exam specified AND a process to follow up with patients regarding missed or unscheduled appointments

## 2024-08-28 ENCOUNTER — OFFICE VISIT (OUTPATIENT)
Dept: DENTISTRY | Facility: CLINIC | Age: 59
End: 2024-08-28

## 2024-08-28 VITALS — SYSTOLIC BLOOD PRESSURE: 153 MMHG | HEART RATE: 105 BPM | DIASTOLIC BLOOD PRESSURE: 88 MMHG | TEMPERATURE: 97.9 F

## 2024-08-28 DIAGNOSIS — K02.9 DENTAL CARIES: Primary | ICD-10-CM

## 2024-08-28 PROCEDURE — D2391 RESIN-BASED COMPOSITE - 1 SURFACE, POSTERIOR: HCPCS | Performed by: DENTIST

## 2024-08-28 NOTE — DENTAL PROCEDURE DETAILS
Patient due for next hygiene recall Oct 2024  Last BWs taken 09-  RMH, NSC, ASA 2 - Patient with mild systemic disease with no functional limitations.  Patient reports pain level of 0.    Patient presents to [facility] Office for restorative treatment #20-B(V), 21-B(V).  EOE WNL.  IOE shows no swelling or sinus tracts.  Anesthesia: 0.5 carpules Articaine, 4% with Epinephrine 1:100,000, given via mental nerve block.  Isolation: Size Small Dryshield Isolation achieved  Tx:  Retentive features placed. Retraction cord placed. Selective etched for 12 seconds with 37% phosphoric acid and rinsed, Ivoclar Adhese Universal bond placed with VivaPen 20 second scrub, air dried until solvent fully evaporated and surface still and light cured, and restored with Tetric Evoflow composite shade A2. Margins checked with explorer. Adjusted as needed. Finished and polished.   Patient satisfied and dismissed alert and ambulatory.    Behavior ++, very good for injection.    NV: Restorative   memantine (NAMENDA) 10 MG tablet    Last Visit- 09/16/2021  Next Visit- 07/28/2022  Last prescription- 06/10/2021, #60, 3 REFILLS

## 2024-08-30 ENCOUNTER — OFFICE VISIT (OUTPATIENT)
Dept: DENTISTRY | Facility: CLINIC | Age: 59
End: 2024-08-30

## 2024-08-30 VITALS — HEART RATE: 103 BPM | TEMPERATURE: 98 F | SYSTOLIC BLOOD PRESSURE: 127 MMHG | DIASTOLIC BLOOD PRESSURE: 83 MMHG

## 2024-08-30 DIAGNOSIS — K02.9 DENTAL CARIES: Primary | ICD-10-CM

## 2024-08-30 PROCEDURE — D2391 RESIN-BASED COMPOSITE - 1 SURFACE, POSTERIOR: HCPCS | Performed by: DENTIST

## 2024-08-30 NOTE — DENTAL PROCEDURE DETAILS
Patient due for next hygiene recall Jan 2025  Last BWs taken 09/2023  RMH, NSC, ASA 2 - Patient with mild systemic disease with no functional limitations.  Patient reports pain level of 0.    Patient originally scheduled for #16-MO to attempt to close contact to reduce food impaction.  Review of prior radiographs shows no caries, teeth 15 and 16 mesially tipped creating non-ideal embrasure space. Tooth #16 is slightly mobile and has widened PDL on last radiographs.  Discussed with patient that it was not advisable to restore these teeth at this time and creating maintainable gingival contour will not be possible with position and mobility of teeth.  Patient understands, 16-MO removed from Tx plan.    Patient presents to [facility] for restorative treatment #18-O.  EOE WNL.  IOE shows no swelling or sinus tracts.  Anesthesia: 0.5 carpules Articaine, 4% with Epinephrine 1:100,000, given via buccal Infiltration.  Isolation: Size Small Dryshield Isolation achieved  Tx:  Retentive features placed. Retraction cord placed. Selective etched for 12 seconds with 37% phosphoric acid and rinsed, Ivoclar Adhese Universal bond placed with VivaPen 20 second scrub, air dried until solvent fully evaporated and surface still and light cured, and restored with Tetric Evoflow composite shade A2.   Margins checked with explorer. Adjusted as needed. Finished and polished.   Patient satisfied and dismissed alert and ambulatory.    Behavior ++, very good for injection.    NV: Restorative upper right class V's

## 2024-08-30 NOTE — DENTAL PROCEDURE DETAILS
Patient presents for a dental restoration and verbally consents for treatment:  Reviewed health history-  Pt is ASA type {David numeral i-vi:54314}  Treatment consents signed: Yes  Perio: {WIS Healthy/Gingivitis/Periodontitis:[id number]}  Pain Scale: {NUMBERS 0-10:56282}  Caries Assessment: {Low, Medium, High:75586}    Radiographs: Films are current  Oral Hygiene instruction reviewed and given  Hygiene recall visits recommended to the patient    Patient agrees with the diagnosis of Caries and the proposed treatment plan for the resin restoration:  Tooth #{dnt general tooth numbers 1-32:20101}  Dental Anesthesia:  1.7 ml 3% carbo no epi.  Material:   Etch Ivoclar bond and resin   Shade: {dnt shade:20085}    Prognosis is Good.   Referrals Needed: No  Next visit: Visit date not found

## 2024-09-06 ENCOUNTER — OFFICE VISIT (OUTPATIENT)
Dept: DENTISTRY | Facility: CLINIC | Age: 59
End: 2024-09-06

## 2024-09-06 VITALS — HEART RATE: 111 BPM | DIASTOLIC BLOOD PRESSURE: 86 MMHG | SYSTOLIC BLOOD PRESSURE: 152 MMHG

## 2024-09-06 DIAGNOSIS — K03.1: Primary | ICD-10-CM

## 2024-09-06 PROCEDURE — D2391 RESIN-BASED COMPOSITE - 1 SURFACE, POSTERIOR: HCPCS

## 2024-09-07 NOTE — DENTAL PROCEDURE DETAILS
Composite Restoration #2B and 5B    Joanna Delarosa 59 y.o. female presents with self to [facility] for composite restoration  PMH reviewed, no changes, ASA II. Significant medical history: Hypertension, asthma, anxiety, iron deficiency anemia. Significant allergies: NKDA. Significant medications: Albuterol, amlodipine, lorazepam.    Diagnosis:  Abfraction #2B and 5B    Prognosis:  good    Consent:  Risks of specific procedure: need for RCT if pulp exposure occurs or in future if pulp is inflamed, need to revise tx plan based on extent of decay, damage to adjacent tooth and/or restoration.  Risks of any dental procedure: post procedural pain or sensitivity, local anesthetic side effects, allergic reaction to dental materials and medications, breakage of local anesthetic needle, aspiration of small dental tools, injury to nearby hard and soft tissues and anatomical structures.  Benefits: Prevent further breakdown of tooth and its sequelae.  Alternatives: No tx.  Tx plan for composite restoration #2B and 5B reviewed. Opportunity to ask questions given, all questions answered to degree of medical and dental certainty.  Patient understands and consent given by self via verbal consent.    Anesthesia:  Topical 20% benzocaine.  1 carps 2% Lidocaine 1:100k epi via buccal infiltration.    Procedure details:  Isolation: DryShield , cotton rolls, and high volume suction  Prepped teeth #2B and 5B with high speed handpiece.  Beveled enamel margins and created retention grooves inside preps.  Band placement: not applicable/needed for this restoration.   Etch with 37% H2PO4 15 seconds. Rinsed and suctioned.  Applied  with 20 second scrub, air dried, and light cured.  Restored with flowable (A3 shade) and light cured.  Checked occlusion and adjusted with finishing burs.  Checked contacts with floss  Polished with enhance point.  Verified occlusion and contacts.    Patient dismissed ambulatory and alert.    NV:  10/30/24 for 6M recall.    Attending: Dr. Abarca was present in clinic.

## 2024-10-30 ENCOUNTER — OFFICE VISIT (OUTPATIENT)
Dept: DENTISTRY | Facility: CLINIC | Age: 59
End: 2024-10-30

## 2024-10-30 VITALS — TEMPERATURE: 98.2 F | DIASTOLIC BLOOD PRESSURE: 88 MMHG | HEART RATE: 102 BPM | SYSTOLIC BLOOD PRESSURE: 151 MMHG

## 2024-10-30 DIAGNOSIS — Z01.20 ENCOUNTER FOR DENTAL EXAMINATION: Primary | ICD-10-CM

## 2024-10-30 DIAGNOSIS — K03.6 ACCRETIONS ON TEETH: ICD-10-CM

## 2024-10-30 PROCEDURE — D4910 PERIODONTAL MAINTENANCE: HCPCS

## 2024-10-30 NOTE — DENTAL PROCEDURE DETAILS
3 Month Periodontal Maintenance      REVIEWED MED HX: meds, allergies, health changes reviewed in EPIC  CHIEF CONCERN:  none   PAIN SCALE: 4  ASA CLASS:  ASA 2 - Patient with mild systemic disease with no functional limitations  PLAQUE:  mild  CALCULUS:  Light- lt- moderate  BLEEDING:  light  STAIN: Light  PERIO:  gen recession   tissue looks much improved    Hygiene Procedures: Scaled, Polished, Flossed and Used Cavitron    Oral Hygiene Instruction: Brushing minimum 2x daily for 2 minutes, daily flossing, OTC Fluoride rinse, and Recommended soft toothbrush only    Visual and Tactile Intraoral/ Extraoral evaluation: Oral and Oropharyngeal cancer evaluation. No findings     REFERRALS: None    EXAM: Dr. Navarro   limited exam to ck # 2 sensitivity    DR Navarro rec. Smoothing overhang of class V dwight # 2  gave information on Sensodyne- rec placing it on tooth at night     Next Recall: 4  month perio maintenance/periodic exam    Last BWX:   9/27/2023  Last Panorex/FMX :  7/2022

## 2025-07-01 ENCOUNTER — OFFICE VISIT (OUTPATIENT)
Dept: FAMILY MEDICINE CLINIC | Facility: CLINIC | Age: 60
End: 2025-07-01

## 2025-07-01 VITALS
SYSTOLIC BLOOD PRESSURE: 140 MMHG | HEIGHT: 59 IN | TEMPERATURE: 98 F | WEIGHT: 132 LBS | OXYGEN SATURATION: 98 % | RESPIRATION RATE: 18 BRPM | DIASTOLIC BLOOD PRESSURE: 90 MMHG | BODY MASS INDEX: 26.61 KG/M2 | HEART RATE: 113 BPM

## 2025-07-01 DIAGNOSIS — E11.9 TYPE 2 DIABETES MELLITUS WITHOUT COMPLICATION, WITHOUT LONG-TERM CURRENT USE OF INSULIN (HCC): ICD-10-CM

## 2025-07-01 DIAGNOSIS — Z11.4 SCREENING FOR HIV (HUMAN IMMUNODEFICIENCY VIRUS): ICD-10-CM

## 2025-07-01 DIAGNOSIS — I10 ESSENTIAL HYPERTENSION: ICD-10-CM

## 2025-07-01 DIAGNOSIS — E03.8 OTHER SPECIFIED HYPOTHYROIDISM: ICD-10-CM

## 2025-07-01 DIAGNOSIS — Z76.89 ENCOUNTER TO ESTABLISH CARE WITH NEW DOCTOR: Primary | ICD-10-CM

## 2025-07-01 DIAGNOSIS — E78.5 DYSLIPIDEMIA: ICD-10-CM

## 2025-07-01 DIAGNOSIS — Z59.82 INABILITY TO ACQUIRE TRANSPORTATION: ICD-10-CM

## 2025-07-01 DIAGNOSIS — C50.812 MALIGNANT NEOPLASM OF OVERLAPPING SITES OF LEFT FEMALE BREAST, UNSPECIFIED ESTROGEN RECEPTOR STATUS (HCC): ICD-10-CM

## 2025-07-01 DIAGNOSIS — F32.4 MAJOR DEPRESSIVE DISORDER WITH SINGLE EPISODE, IN PARTIAL REMISSION (HCC): ICD-10-CM

## 2025-07-01 DIAGNOSIS — Z11.59 NEED FOR HEPATITIS C SCREENING TEST: ICD-10-CM

## 2025-07-01 PROCEDURE — 99214 OFFICE O/P EST MOD 30 MIN: CPT | Performed by: FAMILY MEDICINE

## 2025-07-01 RX ORDER — AMLODIPINE BESYLATE 10 MG/1
10 TABLET ORAL DAILY
Qty: 90 TABLET | Refills: 3 | Status: SHIPPED | OUTPATIENT
Start: 2025-07-01

## 2025-07-01 RX ORDER — LEVOTHYROXINE SODIUM 50 UG/1
50 TABLET ORAL DAILY
Qty: 90 TABLET | Refills: 0 | Status: SHIPPED | OUTPATIENT
Start: 2025-07-01 | End: 2025-07-01 | Stop reason: CLARIF

## 2025-07-01 RX ORDER — ATORVASTATIN CALCIUM 40 MG/1
40 TABLET, FILM COATED ORAL EVERY EVENING
Qty: 90 TABLET | Refills: 1 | Status: SHIPPED | OUTPATIENT
Start: 2025-07-01

## 2025-07-01 SDOH — ECONOMIC STABILITY - TRANSPORTATION SECURITY: TRANSPORTATION INSECURITY: Z59.82

## 2025-07-01 NOTE — ASSESSMENT & PLAN NOTE
Depression Screening Follow-up Plan: Patient's depression screening was positive with a PHQ-9 score of 11. Patient with underlying depression and was advised to continue current medications as prescribed.

## 2025-07-01 NOTE — ASSESSMENT & PLAN NOTE
No longer seen by oncology. Patient will follow up for screening and monitoring next visit

## 2025-07-01 NOTE — ASSESSMENT & PLAN NOTE
Has been off the levothyroxine since 11/2024. Rechecking labs before restarting.    Orders:    TSH, 3rd generation with Free T4 reflex; Future

## 2025-07-01 NOTE — PROGRESS NOTES
Name: Joanna Delarosa      : 1965      MRN: [id number]  Encounter Provider: Andry Blackwood MD  Encounter Date: 2025   Encounter department: [facility]  :  Assessment & Plan  Encounter to establish care with new doctor  Will do labs cbc, cmp, hba1c, tsh, lipid panel to reassess. Will refill and continue with bp meds and f/u in 2 weeks to discuss labs.       Essential hypertension  Changing PCP. Does not do HBPM. Taking [id number] as prescribed. Denies any symptoms.     Orders:    amLODIPine (NORVASC) 10 mg tablet; Take 1 tablet (10 mg total) by mouth daily    Comprehensive metabolic panel; Future    Lipid panel; Future    CBC and differential; Future    Malignant neoplasm of overlapping sites of left female breast, unspecified estrogen receptor status (HCC)  No longer seen by oncology. Patient will follow up for screening and monitoring next visit          Type 2 diabetes mellitus without complication, without long-term current use of insulin (HCC)    Lab Results   Component Value Date    HGBA1C 6.9 (H) 2024       Orders:    Hemoglobin A1C; Future    Comprehensive metabolic panel; Future    Lipid panel; Future    CBC and differential; Future    atorvastatin (LIPITOR) 40 mg tablet; Take 1 tablet (40 mg total) by mouth every evening    Dyslipidemia    Orders:    atorvastatin (LIPITOR) 40 mg tablet; Take 1 tablet (40 mg total) by mouth every evening    acquired hypothyroidism  Has been off the levothyroxine since 2024. Rechecking labs before restarting.    Orders:    TSH, 3rd generation with Free T4 reflex; Future    Screening for HIV (human immunodeficiency virus)    Orders:    HIV 1/2 AG/AB w Reflex [facility] for 2 yr old and above; Future    Need for hepatitis C screening test    Orders:    Hepatitis C Antibody; Future    Inability to acquire transportation    Orders:    Ambulatory referral to social work care management program; Future    Major  depressive disorder with single episode, in partial remission (HCC)  Depression Screening Follow-up Plan: Patient's depression screening was positive with a PHQ-9 score of 11. Patient with underlying depression and was advised to continue current medications as prescribed.             BMI Counseling: Body mass index is 26.66 kg/m². The BMI is above normal. Nutrition recommendations include decreasing portion sizes. Exercise recommendations include moderate physical activity 150 minutes/week. Rationale for BMI follow-up plan is due to patient being overweight or obese.     Depression Screening and Follow-up Plan: Patient's depression screening was positive with a PHQ-9 score of 11.   Continue regular follow-up with their mental health provider who is managing their mental health condition(s).       History of Present Illness   Patient is a 61 y/o female she seeing a psychiatrist for hx of anxiety and depression, last visit was today. Patient was prescribed metropolol for tachycardia and palpitations due to anxiety. Patient has breast cancer hx in remission since 2016. Patient is here to establish care with new provider. She is seeing psych [facility] with [facility] to manage her anxiety. Patient also reports that she is concerned about skin tags that have appeared gradually over the past few months      Review of Systems   Constitutional:  Negative for chills and fever.   HENT:  Negative for ear pain and sore throat.    Eyes:  Negative for pain and visual disturbance.   Respiratory:  Negative for cough and shortness of breath.    Cardiovascular:  Negative for chest pain and palpitations.   Gastrointestinal:  Negative for abdominal pain and vomiting.   Genitourinary:  Negative for dysuria and hematuria.   Musculoskeletal:  Negative for arthralgias and back pain.   Skin:  Negative for color change and rash.   Neurological:  Negative for seizures and syncope.   All other systems reviewed and are  "negative.      Objective   /90 (BP Location: Left arm, Patient Position: Sitting, Cuff Size: Standard)   Pulse (!) 113   Temp 98 °F (36.7 °C) (Temporal)   Resp 18   Ht 4' 11\" (1.499 m)   Wt 59.9 kg (132 lb)   SpO2 98%   Breastfeeding No   BMI 26.66 kg/m²      Physical Exam  Vitals and nursing note reviewed.   Constitutional:       General: She is not in acute distress.     Appearance: She is well-developed.   HENT:      Head: Normocephalic and atraumatic.     Eyes:      Conjunctiva/sclera: Conjunctivae normal.       Cardiovascular:      Rate and Rhythm: Normal rate and regular rhythm.      Heart sounds: No murmur heard.  Pulmonary:      Effort: Pulmonary effort is normal. No respiratory distress.      Breath sounds: Normal breath sounds.   Abdominal:      Palpations: Abdomen is soft.      Tenderness: There is no abdominal tenderness.     Musculoskeletal:         General: No swelling.      Cervical back: Neck supple.     Skin:     General: Skin is warm and dry.      Capillary Refill: Capillary refill takes less than 2 seconds.     Neurological:      Mental Status: She is alert.     Psychiatric:         Mood and Affect: Mood normal.         "

## 2025-07-01 NOTE — ASSESSMENT & PLAN NOTE
Changing PCP. Does not do HBPM. Taking [id number] as prescribed. Denies any symptoms.     Orders:    amLODIPine (NORVASC) 10 mg tablet; Take 1 tablet (10 mg total) by mouth daily    Comprehensive metabolic panel; Future    Lipid panel; Future    CBC and differential; Future

## 2025-07-02 ENCOUNTER — PATIENT OUTREACH (OUTPATIENT)
Dept: FAMILY MEDICINE CLINIC | Facility: CLINIC | Age: 60
End: 2025-07-02

## 2025-07-02 NOTE — PROGRESS NOTES
OP JOSE had received a referral from Andry Blackwood MD r/t transportation insecurity. OP JOSE had completed a chart review. Per chart, the patient has [employer] for health insurance. OP JOSE noted patient has access to [employer] services through insurance, and the application for [employer] will determine eligibility. OP JOSE will reach out to the patient to assess needs further.     OP JOSE had contacted the patient via phone. OP JOSE left a voicemail in Burmese. OP JOSE will attempt to call again at a later date and time. OP JOSE will continue to be available.

## 2025-07-09 ENCOUNTER — APPOINTMENT (OUTPATIENT)
Dept: LAB | Facility: CLINIC | Age: 60
End: 2025-07-09
Payer: MEDICARE

## 2025-07-09 ENCOUNTER — PATIENT OUTREACH (OUTPATIENT)
Dept: FAMILY MEDICINE CLINIC | Facility: CLINIC | Age: 60
End: 2025-07-09

## 2025-07-09 DIAGNOSIS — Z11.4 SCREENING FOR HIV (HUMAN IMMUNODEFICIENCY VIRUS): ICD-10-CM

## 2025-07-09 DIAGNOSIS — E03.8 OTHER SPECIFIED HYPOTHYROIDISM: ICD-10-CM

## 2025-07-09 DIAGNOSIS — Z11.59 NEED FOR HEPATITIS C SCREENING TEST: ICD-10-CM

## 2025-07-09 DIAGNOSIS — I10 ESSENTIAL HYPERTENSION: ICD-10-CM

## 2025-07-09 DIAGNOSIS — E11.9 TYPE 2 DIABETES MELLITUS WITHOUT COMPLICATION, WITHOUT LONG-TERM CURRENT USE OF INSULIN (HCC): ICD-10-CM

## 2025-07-09 LAB
ALBUMIN SERPL BCG-MCNC: 4.7 G/DL (ref 3.5–5)
ALP SERPL-CCNC: 85 U/L (ref 34–104)
ALT SERPL W P-5'-P-CCNC: 23 U/L (ref 7–52)
ANION GAP SERPL CALCULATED.3IONS-SCNC: 13 MMOL/L (ref 4–13)
AST SERPL W P-5'-P-CCNC: 23 U/L (ref 13–39)
BASOPHILS # BLD AUTO: 0.05 THOUSANDS/ÂΜL (ref 0–0.1)
BASOPHILS NFR BLD AUTO: 1 % (ref 0–1)
BILIRUB SERPL-MCNC: 1.23 MG/DL (ref 0.2–1)
BUN SERPL-MCNC: 19 MG/DL (ref 5–25)
CALCIUM SERPL-MCNC: 9.5 MG/DL (ref 8.4–10.2)
CHLORIDE SERPL-SCNC: 100 MMOL/L (ref 96–108)
CHOLEST SERPL-MCNC: 285 MG/DL (ref ?–200)
CO2 SERPL-SCNC: 28 MMOL/L (ref 21–32)
CREAT SERPL-MCNC: 0.9 MG/DL (ref 0.6–1.3)
EOSINOPHIL # BLD AUTO: 0.15 THOUSAND/ÂΜL (ref 0–0.61)
EOSINOPHIL NFR BLD AUTO: 2 % (ref 0–6)
ERYTHROCYTE [DISTWIDTH] IN BLOOD BY AUTOMATED COUNT: 14 % (ref 11.6–15.1)
EST. AVERAGE GLUCOSE BLD GHB EST-MCNC: 143 MG/DL
GFR SERPL CREATININE-BSD FRML MDRD: 70 ML/MIN/1.73SQ M
GLUCOSE P FAST SERPL-MCNC: 122 MG/DL (ref 65–99)
HBA1C MFR BLD: 6.6 %
HCT VFR BLD AUTO: 39.5 % (ref 34.8–46.1)
HDLC SERPL-MCNC: 58 MG/DL
HGB BLD-MCNC: 13.4 G/DL (ref 11.5–15.4)
HIV 1+2 AB+HIV1 P24 AG SERPL QL IA: NORMAL
IMM GRANULOCYTES # BLD AUTO: 0.01 THOUSAND/UL (ref 0–0.2)
IMM GRANULOCYTES NFR BLD AUTO: 0 % (ref 0–2)
LDLC SERPL CALC-MCNC: 182 MG/DL (ref 0–100)
LYMPHOCYTES # BLD AUTO: 2.96 THOUSANDS/ÂΜL (ref 0.6–4.47)
LYMPHOCYTES NFR BLD AUTO: 41 % (ref 14–44)
MCH RBC QN AUTO: 28.2 PG (ref 26.8–34.3)
MCHC RBC AUTO-ENTMCNC: 33.9 G/DL (ref 31.4–37.4)
MCV RBC AUTO: 83 FL (ref 82–98)
MONOCYTES # BLD AUTO: 0.61 THOUSAND/ÂΜL (ref 0.17–1.22)
MONOCYTES NFR BLD AUTO: 8 % (ref 4–12)
NEUTROPHILS # BLD AUTO: 3.46 THOUSANDS/ÂΜL (ref 1.85–7.62)
NEUTS SEG NFR BLD AUTO: 48 % (ref 43–75)
NONHDLC SERPL-MCNC: 227 MG/DL
NRBC BLD AUTO-RTO: 0 /100 WBCS
PLATELET # BLD AUTO: 270 THOUSANDS/UL (ref 149–390)
PMV BLD AUTO: 11.6 FL (ref 8.9–12.7)
POTASSIUM SERPL-SCNC: 3.3 MMOL/L (ref 3.5–5.3)
PROT SERPL-MCNC: 8.4 G/DL (ref 6.4–8.4)
RBC # BLD AUTO: 4.76 MILLION/UL (ref 3.81–5.12)
SODIUM SERPL-SCNC: 141 MMOL/L (ref 135–147)
T4 FREE SERPL-MCNC: 0.9 NG/DL (ref 0.61–1.12)
TRIGL SERPL-MCNC: 223 MG/DL (ref ?–150)
TSH SERPL DL<=0.05 MIU/L-ACNC: 15.36 UIU/ML (ref 0.45–4.5)
WBC # BLD AUTO: 7.24 THOUSAND/UL (ref 4.31–10.16)

## 2025-07-09 PROCEDURE — 80053 COMPREHEN METABOLIC PANEL: CPT

## 2025-07-09 PROCEDURE — 83036 HEMOGLOBIN GLYCOSYLATED A1C: CPT

## 2025-07-09 PROCEDURE — 36415 COLL VENOUS BLD VENIPUNCTURE: CPT

## 2025-07-09 PROCEDURE — 87389 HIV-1 AG W/HIV-1&-2 AB AG IA: CPT

## 2025-07-09 PROCEDURE — 85025 COMPLETE CBC W/AUTO DIFF WBC: CPT

## 2025-07-09 PROCEDURE — 86803 HEPATITIS C AB TEST: CPT

## 2025-07-09 PROCEDURE — 80061 LIPID PANEL: CPT

## 2025-07-09 PROCEDURE — 84439 ASSAY OF FREE THYROXINE: CPT

## 2025-07-09 PROCEDURE — 84443 ASSAY THYROID STIM HORMONE: CPT

## 2025-07-09 NOTE — PROGRESS NOTES
OMAR GARCIA had contacted the patient. OP JOSE spoke in the preferred language, Pakistani. OP JOSE introduced herself and reviewed the reason for the consultation. OP SW also explained her role in the process. Patient understood the information provided.     Patient reported she lives with daughter and grandson. Patient reported her daughter is her main support. Patient reported she has SSI for income. Patient reported she has SNAP benefits.    Patient denied any housing or utility issues at this time. OP JOSE explained process to apply for [employer]. Patient agreeable.    OMAR GARCIA reviewed the CMOC role with the patient. Patient understood and agreed to the CMOC outreach. OP JOSE placed a referral for assistance with [employer] application.    Patient noted she has [employer] for health insurance. Patient independent with her ADL/IADLs at this time. Patient declined waiver services.    Per chart, patient has anxiety and depression. Patient mentioned she goes to [facility] located [facility] for MH services. Per chart, there are no h/o MARINA. Patient did not report any additional needs at this time.     OMAR GARCIA provided her contact information. OMAR GARCIA advised the patient to reach out as needed. Patient agreed and consented to continued OP SW outreach. OMAR GARCIA enrolled the patient in the program. OMAR GARCIA routed note to CMOCs. OMAR GARCIA will continue f/u.

## 2025-07-10 LAB — HCV AB SER QL: NORMAL

## 2025-07-14 ENCOUNTER — PATIENT OUTREACH (OUTPATIENT)
Dept: FAMILY MEDICINE CLINIC | Facility: CLINIC | Age: 60
End: 2025-07-14

## 2025-07-14 NOTE — PROGRESS NOTES
Outgoing Call  07/14/2025    CM called Joanna Boogie on this day regarding new referral received from  to assist with [employer] application.    CMOC introduced herself and her role. Joanna agreed services and home visit.    CMOC explained Joanna that [employer] application will be completed at the time of Home Visit. Joanna expressed understanding.    Home Visit was scheduled on 07/15/2025.

## 2025-07-15 ENCOUNTER — PATIENT OUTREACH (OUTPATIENT)
Dept: FAMILY MEDICINE CLINIC | Facility: CLINIC | Age: 60
End: 2025-07-15

## 2025-07-15 ENCOUNTER — OFFICE VISIT (OUTPATIENT)
Dept: FAMILY MEDICINE CLINIC | Facility: CLINIC | Age: 60
End: 2025-07-15

## 2025-07-15 VITALS
WEIGHT: 131 LBS | SYSTOLIC BLOOD PRESSURE: 160 MMHG | BODY MASS INDEX: 26.41 KG/M2 | HEIGHT: 59 IN | OXYGEN SATURATION: 98 % | DIASTOLIC BLOOD PRESSURE: 80 MMHG | HEART RATE: 111 BPM | TEMPERATURE: 97.1 F | RESPIRATION RATE: 16 BRPM

## 2025-07-15 DIAGNOSIS — I10 ESSENTIAL HYPERTENSION: Primary | ICD-10-CM

## 2025-07-15 DIAGNOSIS — I89.0 LYMPHEDEMA: ICD-10-CM

## 2025-07-15 DIAGNOSIS — E03.9 HYPOTHYROIDISM, UNSPECIFIED TYPE: ICD-10-CM

## 2025-07-15 DIAGNOSIS — R73.03 PREDIABETES: ICD-10-CM

## 2025-07-15 DIAGNOSIS — I89.0 LYMPHEDEMA OF LEFT ARM: ICD-10-CM

## 2025-07-15 DIAGNOSIS — E78.5 DYSLIPIDEMIA: ICD-10-CM

## 2025-07-15 DIAGNOSIS — Z12.31 ENCOUNTER FOR SCREENING MAMMOGRAM FOR BREAST CANCER: ICD-10-CM

## 2025-07-15 DIAGNOSIS — M25.562 LEFT ANTERIOR KNEE PAIN: ICD-10-CM

## 2025-07-15 PROCEDURE — 99213 OFFICE O/P EST LOW 20 MIN: CPT | Performed by: STUDENT IN AN ORGANIZED HEALTH CARE EDUCATION/TRAINING PROGRAM

## 2025-07-15 RX ORDER — LEVOTHYROXINE SODIUM 25 UG/1
25 TABLET ORAL DAILY
Qty: 60 TABLET | Refills: 0 | Status: SHIPPED | OUTPATIENT
Start: 2025-07-15 | End: 2025-09-13

## 2025-07-15 RX ORDER — LISINOPRIL 10 MG/1
10 TABLET ORAL DAILY
Qty: 30 TABLET | Refills: 1 | Status: SHIPPED | OUTPATIENT
Start: 2025-07-15 | End: 2025-09-13

## 2025-07-15 NOTE — ASSESSMENT & PLAN NOTE
Patient has fasting plasma glucose of 122 and a A1c of 6.6%. Patient is currently pre-diabetic. Started on metformin    Plan:   -Start & continue metformin for glycemic control  -Monitor diet and exercise

## 2025-07-15 NOTE — ASSESSMENT & PLAN NOTE
Patient does not do HBPM. Taking amlodipine as prescribed. Denies any symptoms.     Plan:   -Start home blood pressure monitoring with log book.   -Start additional medication lisinopril to achieve better BP control.

## 2025-07-15 NOTE — PROGRESS NOTES
Name: Joanna Delarosa      : 1965      MRN: [id number]  Encounter Provider: Andry Blackwood MD  Encounter Date: 7/15/2025   Encounter department: [facility]  :  Assessment & Plan  Essential hypertension  Patient does not do HBPM. Taking amlodipine as prescribed. Denies any symptoms.     Plan:   -Start home blood pressure monitoring with log book.   -Start additional medication lisinopril to achieve better BP control.   Prediabetes  Patient has fasting plasma glucose of 122 and a A1c of 6.6%. Patient is currently pre-diabetic. Started on metformin    Plan:   -Start & continue metformin for glycemic control  -Monitor diet and exercise   Dyslipidemia  Restart lipid lowering therapy    Plan  -Advised patient to restart statin. Started on lowest dose due to patient concerns for medications  Hypothyroidism, unspecified type  Patient non-compliant with medication for hypothyroidism. Last TSH levels of 15.3     Plan:  -Restart levo   -Recheck levels in 3 months    Orders:    levothyroxine (Levo-T) 25 mcg tablet; Take 1 tablet (25 mcg total) by mouth daily    Lymphedema of left arm  Patient hs recurrent lymphedema of LUE s/p mastectomy. Patent has received PT previously.     Plan:  -Refer to PT for massages/pressure bandaging or her left arm.  Lymphedema    Orders:    Ambulatory Referral to Physical Therapy; Future    Left anterior knee pain  Patient has left knee pain after climbing stairs.     Plan:  -NASAID topical for pain   -If pain is persistent will consider xray of the knee.   Orders:    Diclofenac Sodium (VOLTAREN) 1 %; Apply 2 g topically 4 (four) times a day      History of Present Illness   Patient comes to the office today for a follow-up due to high blood pressure and labwork results. Patient is accompanied by her sister today at the visit. Patient is hesitant to start new medications. Patient reports that she not currently doing HBPM. Patient also reports  "lymphedema or her left arm that has been persistent since her mastectomy procedure. Patient reports that she has received physical therapy in the past for this particular concern. Patient would like to be evaluated for further physical therapy and treatment for her lymphedema. Patient reports no chest pain, shortness of breath, nausea, vomiting or dizziness.      Review of Systems   Constitutional:  Positive for fatigue. Negative for chills and fever.   HENT:  Negative for ear pain and sore throat.    Eyes:  Negative for pain and visual disturbance.   Respiratory:  Negative for cough and shortness of breath.    Cardiovascular:  Negative for chest pain and palpitations.   Gastrointestinal:  Negative for abdominal pain and vomiting.   Genitourinary:  Negative for dysuria and hematuria.   Musculoskeletal:  Negative for arthralgias and back pain.   Skin:  Negative for color change and rash.   Neurological:  Negative for seizures and syncope.   All other systems reviewed and are negative.      Objective   /80 (BP Location: Right arm, Patient Position: Sitting, Cuff Size: Standard)   Pulse (!) 111   Temp (!) 97.1 °F (36.2 °C) (Temporal)   Resp 16   Ht 4' 11\" (1.499 m)   Wt 59.4 kg (131 lb)   SpO2 98%   BMI 26.46 kg/m²      Physical Exam  Vitals and nursing note reviewed.   Constitutional:       General: She is not in acute distress.     Appearance: She is well-developed.   HENT:      Head: Normocephalic and atraumatic.     Eyes:      Conjunctiva/sclera: Conjunctivae normal.       Cardiovascular:      Rate and Rhythm: Normal rate and regular rhythm.      Heart sounds: No murmur heard.     Comments: Non pitting edema bilat LE  Pulmonary:      Effort: Pulmonary effort is normal. No respiratory distress.      Breath sounds: Normal breath sounds.   Abdominal:      Palpations: Abdomen is soft.      Tenderness: There is no abdominal tenderness.     Musculoskeletal:         General: Swelling present.      Right upper " arm: Normal.      Left upper arm: Edema present.      Cervical back: Neck supple.      Right lower le+ Edema present.      Left lower le+ Edema present.     Skin:     General: Skin is warm and dry.      Capillary Refill: Capillary refill takes less than 2 seconds.     Neurological:      Mental Status: She is alert.     Psychiatric:         Mood and Affect: Mood normal.

## 2025-07-15 NOTE — ASSESSMENT & PLAN NOTE
Restart lipid lowering therapy    Plan  -Advised patient to restart statin. Started on lowest dose due to patient concerns for medications

## 2025-07-16 NOTE — PROGRESS NOTES
Outgoing Call  07/15/2025    CMOC met with Joanna on this day.    CMOC completed and submitted [employer] application.    CMOC explained Joanna that Lucata Alex will call to schedule Physical Evaluation which needs to be completed as part of application process. Joanna expressed understanding.    CMOC will continue to follow up.    Next outreach was scheduled on 07/23/2025.

## 2025-07-16 NOTE — ASSESSMENT & PLAN NOTE
Patient hs recurrent lymphedema of LUE s/p mastectomy. Patent has received PT previously.     Plan:  -Refer to PT for massages/pressure bandaging or her left arm.

## 2025-07-28 ENCOUNTER — PATIENT OUTREACH (OUTPATIENT)
Dept: FAMILY MEDICINE CLINIC | Facility: CLINIC | Age: 60
End: 2025-07-28

## 2025-07-30 ENCOUNTER — PATIENT OUTREACH (OUTPATIENT)
Dept: FAMILY MEDICINE CLINIC | Facility: CLINIC | Age: 60
End: 2025-07-30

## (undated) DEVICE — PLUMEPEN PRO 10FT

## (undated) DEVICE — SUT MONOCRYL 3-0 SH 27 IN Y416H

## (undated) DEVICE — TUBING ASPIRATION LIPOSUCTION SET 12FT

## (undated) DEVICE — 3M™ STERI-STRIP™ REINFORCED ADHESIVE SKIN CLOSURES, R1542, 1/4 IN X 1-1/2 IN (6 MM X 38 MM), 6 STRIPS/ENVELOPE: Brand: 3M™ STERI-STRIP™

## (undated) DEVICE — VIOLET BRAIDED (POLYGLACTIN 910), SYNTHETIC ABSORBABLE SUTURE: Brand: COATED VICRYL

## (undated) DEVICE — DRAPE SHEET THREE QUARTER

## (undated) DEVICE — DRESSING BIOPATCH ANTIMICROBIAL 1 IN DISC

## (undated) DEVICE — HEAVY DUTY TABLE COVER: Brand: CONVERTORS

## (undated) DEVICE — INTENDED FOR TISSUE SEPARATION, AND OTHER PROCEDURES THAT REQUIRE A SHARP SURGICAL BLADE TO PUNCTURE OR CUT.: Brand: BARD-PARKER SAFETY BLADES SIZE 10, STERILE

## (undated) DEVICE — ABDOMINAL PAD: Brand: DERMACEA

## (undated) DEVICE — GLOVE SRG BIOGEL ECLIPSE 8

## (undated) DEVICE — SUT MONOCRYL 4-0 PS-2 18 IN Y496G

## (undated) DEVICE — MEDI-VAC YANKAUER SUCTION HANDLE W/STRAIGHT TIP & CONTROL VENT: Brand: CARDINAL HEALTH

## (undated) DEVICE — INTENDED FOR TISSUE SEPARATION, AND OTHER PROCEDURES THAT REQUIRE A SHARP SURGICAL BLADE TO PUNCTURE OR CUT.: Brand: BARD-PARKER SAFETY BLADES SIZE 15, STERILE

## (undated) DEVICE — MAYO STAND COVER: Brand: CONVERTORS

## (undated) DEVICE — SUT STRATAFIX SPIRAL MONOCRYL PLUS 3-0 PS-2 45CM SXMP1B107

## (undated) DEVICE — TRAY FOLEY 16FR SURESTEP TEMP SENS URIMETER STAT LOK

## (undated) DEVICE — 3M™ V.A.C.® GRANUFOAM™ DRESSING KIT, M8275052, MEDIUM: Brand: 3M™ V.A.C.® GRANUFOAM™

## (undated) DEVICE — PENCIL ELECTROSURG E-Z CLEAN -0035H

## (undated) DEVICE — UTILITY MARKER,BLACK WITH LABELS: Brand: DEVON

## (undated) DEVICE — SUT MONOCRYL 4-0 P-3 18 IN Y494G

## (undated) DEVICE — SYRINGE 10ML LL

## (undated) DEVICE — ELECTRODE BLADE MOD E-Z CLEAN 2.5IN 6.4CM -0012M

## (undated) DEVICE — GLOVE INDICATOR PI UNDERGLOVE SZ 8.5 BLUE

## (undated) DEVICE — SPECIMEN CONTAINER STERILE PEEL PACK

## (undated) DEVICE — SURGIFOAM 8.5 X 12.5

## (undated) DEVICE — MICROVASCULAR CLAMPS ARE USED FOR END-TO-END ANASTOMOTIC PROCEDURES FOR ARTERIES AND VEINS: Brand: GEM BIOVER MICROVASCULAR CLAMP

## (undated) DEVICE — 3M™ IOBAN™ 2 ANTIMICROBIAL INCISE DRAPE 6650EZ: Brand: IOBAN™ 2

## (undated) DEVICE — BETHLEHEM UNIVERSAL BREAST PK: Brand: CARDINAL HEALTH

## (undated) DEVICE — 3M™ TEGADERM™ TRANSPARENT FILM DRESSING FRAME STYLE, 1626W, 4 IN X 4-3/4 IN (10 CM X 12 CM), 50/CT 4CT/CASE: Brand: 3M™ TEGADERM™

## (undated) DEVICE — DRAPE FLUID WARMER (BIRD BATH)

## (undated) DEVICE — SUT VICRYL 3-0 SH 27 IN J416H

## (undated) DEVICE — VIAL DECANTER

## (undated) DEVICE — PACK UNIVERSAL DRAPES SUB-Q ICD

## (undated) DEVICE — SPECIMEN TRAP: Brand: ARGYLE

## (undated) DEVICE — STERILE MUSCLE FLAP PACK: Brand: CARDINAL HEALTH

## (undated) DEVICE — DRAPE SHEET X-LG

## (undated) DEVICE — SYRINGE 5ML LL

## (undated) DEVICE — ADHESIVE SKN CLSR HISTOACRYL FLEX 0.5ML LF

## (undated) DEVICE — ADHESIVE SKIN HIGH VISCOSITY EXOFIN 1ML

## (undated) DEVICE — NEEDLE 25G X 1 1/2

## (undated) DEVICE — PREVENA PLUS INCISION MANAGEMENT SYSTEM: Brand: PREVENA PLUS™

## (undated) DEVICE — ELECTRODE BLADE MOD E-Z CLEAN  2.75IN 7CM -0012AM

## (undated) DEVICE — HEMOCLIP CARTRIDGE MED

## (undated) DEVICE — SUT MONOCRYL 3-0 PS-2 18 IN Y497G

## (undated) DEVICE — MICROCLIP HEMOSTATIC SUPERFINE TI

## (undated) DEVICE — PROXIMATE SKIN STAPLERS (35 WIDE) CONTAINS 35 STAINLESS STEEL STAPLES (FIXED HEAD): Brand: PROXIMATE

## (undated) DEVICE — TUBING INFILTRATION SOFTOUCH PUMP

## (undated) DEVICE — 3M™ TEGADERM™ TRANSPARENT FILM DRESSING FRAME STYLE, 1628, 6 IN X 8 IN (15 CM X 20 CM), 10/CT 8CT/CASE: Brand: 3M™ TEGADERM™

## (undated) DEVICE — ELECTRODE NEEDLE MOD E-Z CLEAN 2.75IN 7CM -0013M

## (undated) DEVICE — DRAPE MICROSCOPE OPMI PENTERO

## (undated) DEVICE — DRAPE PROBE NEO-PROBE/ULTRASOUND

## (undated) DEVICE — BIPOLAR CORD DISP

## (undated) DEVICE — SUT MONOCRYL 3-0 SH 27 IN Y316H

## (undated) DEVICE — 1840 FOAM BLOCK NEEDLE COUNTER: Brand: DEVON

## (undated) DEVICE — HOOK ELASTIC STAY 12MM BLUNT SNGL STRL

## (undated) DEVICE — BINDER ABDOMINAL 46-62 IN

## (undated) DEVICE — SENSOR T OX  PATCH SM

## (undated) DEVICE — JACKSON-PRATT 100CC BULB RESERVOIR: Brand: CARDINAL HEALTH

## (undated) DEVICE — WIPES INSTRUMENT 3 X 3IN MEROCEL

## (undated) DEVICE — SUT PLAIN 5-0 PC-1 18 IN 1915G

## (undated) DEVICE — SCD SEQUENTIAL COMPRESSION COMFORT SLEEVE MEDIUM KNEE LENGTH: Brand: KENDALL SCD

## (undated) DEVICE — HEMOCLIP CARTRIDGE SM

## (undated) DEVICE — INTENDED FOR TISSUE SEPARATION, AND OTHER PROCEDURES THAT REQUIRE A SHARP SURGICAL BLADE TO PUNCTURE OR CUT.: Brand: BARD-PARKER ® CARBON RIB-BACK BLADES

## (undated) DEVICE — CHLORAPREP HI-LITE 26ML ORANGE

## (undated) DEVICE — SUT MONOCRYL 5-0 PC-2 18 IN Y495G

## (undated) DEVICE — CHEST/BREAST DRAPE: Brand: CONVERTORS

## (undated) DEVICE — TELFA NON-ADHERENT ABSORBENT DRESSING: Brand: TELFA

## (undated) DEVICE — SUT VICRYL 2-0 SH 27 IN UNDYED J417H

## (undated) DEVICE — SYRINGE 20ML LL

## (undated) DEVICE — OCCLUSIVE GAUZE STRIP,3% BISMUTH TRIBROMOPHENATE IN PETROLATUM BLEND: Brand: XEROFORM

## (undated) DEVICE — JP CHAN DRN SIL HUBLESS 15FR W/TRO: Brand: CARDINAL HEALTH

## (undated) DEVICE — TUBING SUCTION 5MM X 12 FT

## (undated) DEVICE — STAPLER INSORB SUBCUTICULAR 30 SINGLE USE

## (undated) DEVICE — SUT STRATAFIX SPIRAL MONOCRYL PLUS 4-0 PS-2 45CM SXMP1B118

## (undated) DEVICE — REGULAR TIP OPTHALMIC SPONGE: Brand: MICROSPONGE

## (undated) DEVICE — MICROCLIP GEM TI 2.4-3.1MM